# Patient Record
Sex: FEMALE | Race: WHITE | NOT HISPANIC OR LATINO | Employment: FULL TIME | URBAN - METROPOLITAN AREA
[De-identification: names, ages, dates, MRNs, and addresses within clinical notes are randomized per-mention and may not be internally consistent; named-entity substitution may affect disease eponyms.]

---

## 2023-03-12 PROBLEM — B37.31 VAGINAL YEAST INFECTION: Status: ACTIVE | Noted: 2023-03-12

## 2023-03-12 PROBLEM — N32.81 OVERACTIVE BLADDER: Status: ACTIVE | Noted: 2023-03-12

## 2023-03-12 PROBLEM — M54.17 LUMBOSACRAL RADICULITIS: Status: ACTIVE | Noted: 2023-03-12

## 2023-03-12 PROBLEM — N94.6 DYSMENORRHEA: Status: ACTIVE | Noted: 2023-03-12

## 2023-03-12 PROBLEM — M79.18 MYOFASCIAL PAIN SYNDROME: Status: ACTIVE | Noted: 2023-03-12

## 2023-03-12 PROBLEM — M99.05 SEGMENTAL AND SOMATIC DYSFUNCTION OF PELVIC REGION: Status: ACTIVE | Noted: 2023-03-12

## 2023-03-12 PROBLEM — R30.0 DYSURIA: Status: ACTIVE | Noted: 2023-03-12

## 2023-03-12 PROBLEM — M99.04 SEGMENTAL AND SOMATIC DYSFUNCTION OF SACRAL REGION: Status: ACTIVE | Noted: 2023-03-12

## 2023-03-12 PROBLEM — R39.89 BLADDER PAIN: Status: ACTIVE | Noted: 2023-03-12

## 2023-03-12 PROBLEM — N39.0 UTI (URINARY TRACT INFECTION): Status: ACTIVE | Noted: 2023-03-12

## 2023-03-12 PROBLEM — M62.89 PELVIC FLOOR DYSFUNCTION: Status: ACTIVE | Noted: 2023-03-12

## 2023-03-12 PROBLEM — M53.3 SACROILIAC DYSFUNCTION: Status: ACTIVE | Noted: 2023-03-12

## 2023-03-12 PROBLEM — M99.03 SEGMENTAL AND SOMATIC DYSFUNCTION OF LUMBAR REGION: Status: ACTIVE | Noted: 2023-03-12

## 2023-03-12 RX ORDER — CETIRIZINE HYDROCHLORIDE 10 MG/1
1 TABLET ORAL DAILY
COMMUNITY
Start: 2020-10-12 | End: 2023-05-24

## 2023-03-12 RX ORDER — ETONOGESTREL AND ETHINYL ESTRADIOL VAGINAL RING .015; .12 MG/D; MG/D
RING VAGINAL
COMMUNITY
Start: 2021-05-21 | End: 2023-05-24

## 2023-03-12 RX ORDER — CYCLOBENZAPRINE HCL 10 MG
TABLET ORAL
COMMUNITY
Start: 2022-02-23 | End: 2023-05-24

## 2023-03-12 RX ORDER — TERCONAZOLE 8 MG/G
CREAM VAGINAL
COMMUNITY
Start: 2021-04-26 | End: 2023-05-24

## 2023-03-12 RX ORDER — KETOCONAZOLE 20 MG/ML
SHAMPOO, SUSPENSION TOPICAL
COMMUNITY
Start: 2022-03-16 | End: 2024-04-10 | Stop reason: SDUPTHER

## 2023-03-12 RX ORDER — FLUCONAZOLE 100 MG/1
1 TABLET ORAL DAILY
COMMUNITY
Start: 2022-01-18 | End: 2023-05-24 | Stop reason: ALTCHOICE

## 2023-03-12 RX ORDER — HYDROXYZINE HYDROCHLORIDE 25 MG/1
1 TABLET, FILM COATED ORAL NIGHTLY
COMMUNITY
Start: 2020-12-16 | End: 2024-01-22 | Stop reason: SDUPTHER

## 2023-03-12 RX ORDER — CLOTRIMAZOLE AND BETAMETHASONE DIPROPIONATE 10; .64 MG/G; MG/G
CREAM TOPICAL
COMMUNITY
Start: 2022-01-18 | End: 2023-05-24

## 2023-03-12 RX ORDER — TRIAMCINOLONE ACETONIDE 0.25 MG/G
CREAM TOPICAL
COMMUNITY
Start: 2022-01-27 | End: 2023-05-24

## 2023-03-12 RX ORDER — AMITRIPTYLINE HYDROCHLORIDE 50 MG/1
1 TABLET, FILM COATED ORAL NIGHTLY
COMMUNITY
Start: 2021-04-26 | End: 2024-04-10 | Stop reason: SDUPTHER

## 2023-03-12 RX ORDER — IPRATROPIUM BROMIDE 21 UG/1
SPRAY, METERED NASAL
COMMUNITY
Start: 2020-01-27 | End: 2023-05-24

## 2023-03-12 RX ORDER — CLOBETASOL PROPIONATE 0.5 MG/G
OINTMENT TOPICAL
COMMUNITY
Start: 2020-05-18 | End: 2023-05-24

## 2023-03-12 RX ORDER — TRAMADOL HYDROCHLORIDE 50 MG/1
1 TABLET ORAL 3 TIMES DAILY PRN
COMMUNITY
Start: 2020-10-18 | End: 2023-10-30 | Stop reason: SDUPTHER

## 2023-03-12 RX ORDER — PREGABALIN 50 MG/1
CAPSULE ORAL
COMMUNITY
Start: 2021-05-24 | End: 2023-05-24

## 2023-03-12 RX ORDER — AZELAIC ACID 0.15 G/G
GEL TOPICAL
COMMUNITY
Start: 2022-02-24 | End: 2024-04-10 | Stop reason: SDUPTHER

## 2023-03-12 RX ORDER — TAMSULOSIN HYDROCHLORIDE 0.4 MG/1
1 CAPSULE ORAL NIGHTLY
COMMUNITY
Start: 2020-10-18 | End: 2024-04-10 | Stop reason: ALTCHOICE

## 2023-03-12 RX ORDER — MOMETASONE FUROATE 50 UG/1
SPRAY, METERED NASAL
COMMUNITY
Start: 2020-03-12 | End: 2023-05-24

## 2023-03-12 RX ORDER — AZELASTINE 1 MG/ML
SPRAY, METERED NASAL
COMMUNITY
Start: 2020-03-10 | End: 2023-05-24

## 2023-03-12 RX ORDER — MONTELUKAST SODIUM 10 MG/1
TABLET ORAL
COMMUNITY
Start: 2020-02-10 | End: 2023-05-24

## 2023-03-12 RX ORDER — ESCITALOPRAM OXALATE 10 MG/1
TABLET ORAL
COMMUNITY
Start: 2020-03-18 | End: 2023-05-24

## 2023-03-12 RX ORDER — SOLIFENACIN SUCCINATE 10 MG/1
1 TABLET, FILM COATED ORAL DAILY
COMMUNITY
Start: 2020-10-10 | End: 2023-10-30 | Stop reason: SDUPTHER

## 2023-03-12 RX ORDER — NORETHINDRONE 5 MG/1
1 TABLET ORAL DAILY
COMMUNITY
Start: 2021-01-14 | End: 2023-05-24

## 2023-03-12 RX ORDER — PHENAZOPYRIDINE HYDROCHLORIDE 200 MG/1
1 TABLET, FILM COATED ORAL DAILY PRN
COMMUNITY
Start: 2021-02-26 | End: 2023-05-24 | Stop reason: ALTCHOICE

## 2023-05-22 ASSESSMENT — PROMIS GLOBAL HEALTH SCALE
CARRYOUT_PHYSICAL_ACTIVITIES: COMPLETELY
RATE_MENTAL_HEALTH: GOOD
RATE_QUALITY_OF_LIFE: VERY GOOD
CARRYOUT_SOCIAL_ACTIVITIES: VERY GOOD
RATE_AVERAGE_FATIGUE: MODERATE
RATE_PHYSICAL_HEALTH: GOOD
EMOTIONAL_PROBLEMS: SOMETIMES
RATE_AVERAGE_PAIN: 4
RATE_SOCIAL_SATISFACTION: VERY GOOD
RATE_GENERAL_HEALTH: GOOD

## 2023-05-24 ENCOUNTER — OFFICE VISIT (OUTPATIENT)
Dept: PRIMARY CARE | Facility: CLINIC | Age: 38
End: 2023-05-24
Payer: COMMERCIAL

## 2023-05-24 VITALS
BODY MASS INDEX: 22.13 KG/M2 | RESPIRATION RATE: 16 BRPM | HEART RATE: 88 BPM | WEIGHT: 146 LBS | SYSTOLIC BLOOD PRESSURE: 116 MMHG | OXYGEN SATURATION: 100 % | HEIGHT: 68 IN | DIASTOLIC BLOOD PRESSURE: 78 MMHG | TEMPERATURE: 98.3 F

## 2023-05-24 DIAGNOSIS — R10.2 PELVIC PAIN: ICD-10-CM

## 2023-05-24 DIAGNOSIS — D80.1 HYPOGAMMAGLOBULINEMIA (MULTI): ICD-10-CM

## 2023-05-24 DIAGNOSIS — Z00.00 HEALTH CARE MAINTENANCE: Primary | ICD-10-CM

## 2023-05-24 PROBLEM — F32.A DEPRESSION: Status: ACTIVE | Noted: 2023-05-24

## 2023-05-24 PROBLEM — M53.3 DISORDER OF SACROILIAC JOINT: Status: ACTIVE | Noted: 2023-03-07

## 2023-05-24 PROBLEM — N76.0 RECURRENT VAGINITIS: Status: ACTIVE | Noted: 2022-01-18

## 2023-05-24 PROBLEM — N30.10 INTERSTITIAL CYSTITIS: Status: ACTIVE | Noted: 2020-09-03

## 2023-05-24 PROBLEM — G89.29 CHRONIC PELVIC PAIN IN FEMALE: Status: ACTIVE | Noted: 2020-09-03

## 2023-05-24 PROBLEM — J34.3 NASAL TURBINATE HYPERTROPHY: Status: ACTIVE | Noted: 2020-10-12

## 2023-05-24 PROCEDURE — 99204 OFFICE O/P NEW MOD 45 MIN: CPT | Performed by: INTERNAL MEDICINE

## 2023-05-24 PROCEDURE — 1036F TOBACCO NON-USER: CPT | Performed by: INTERNAL MEDICINE

## 2023-05-24 RX ORDER — FLUOXETINE 10 MG/1
CAPSULE ORAL
COMMUNITY
Start: 2023-03-06 | End: 2023-07-24 | Stop reason: ALTCHOICE

## 2023-05-24 RX ORDER — METHENAMINE, SODIUM PHOSPHATE, MONOBASIC, MONOHYDRATE, PHENYL SALICYLATE, METHYLENE BLUE, AND HYOSCYAMINE SULFATE 118; 40.8; 36; 10; .12 MG/1; MG/1; MG/1; MG/1; MG/1
1 CAPSULE ORAL EVERY 6 HOURS PRN
COMMUNITY
Start: 2022-05-11 | End: 2024-04-04 | Stop reason: SDUPTHER

## 2023-05-24 RX ORDER — DULOXETIN HYDROCHLORIDE 30 MG/1
30 CAPSULE, DELAYED RELEASE ORAL DAILY
Qty: 30 CAPSULE | Refills: 11 | Status: SHIPPED | OUTPATIENT
Start: 2023-05-24 | End: 2023-07-24 | Stop reason: ALTCHOICE

## 2023-05-24 ASSESSMENT — PAIN SCALES - GENERAL: PAINLEVEL: 2

## 2023-05-24 NOTE — PROGRESS NOTES
"Subjective   Vianney Acevedo is a 37 y.o. female who presents for Establish Care.    HPI   NP to establish.    Moved to area within last year.  Sees Dr. Tidwell d/t h/o Interstitial Cystitis.  In PT for bladder pain.  Recommended MRI d/t pain/concern for endometriosis.  GYN, Dr. Truong  Periods painful and heavy.    Wants to cut back on meds.    See scanned doc        Review of Systems   Constitutional:  Negative for fatigue and fever.   Respiratory:  Negative for cough and shortness of breath.    Cardiovascular:  Negative for chest pain and leg swelling.   All other systems reviewed and are negative.      Health Maintenance Due   Topic Date Due    Yearly Adult Physical  Never done    Hepatitis B Vaccines (1 of 3 - 3-dose series) Never done    Lipid Panel  Never done    HIV Screening  Never done    Hepatitis C Screening  Never done    Cervical Cancer Screening  Never done    Varicella Vaccines (1 of 2 - 2-dose childhood series) Never done    COVID-19 Vaccine (4 - Booster for Pfizer series) 11/02/2022       Objective   /78   Pulse 88   Temp 36.8 °C (98.3 °F)   Resp 16   Ht 1.727 m (5' 8\")   Wt 66.2 kg (146 lb)   LMP 05/03/2023   SpO2 100%   BMI 22.20 kg/m²     Physical Exam  Vitals and nursing note reviewed.   Constitutional:       Appearance: Normal appearance.   HENT:      Head: Normocephalic.   Eyes:      Conjunctiva/sclera: Conjunctivae normal.      Pupils: Pupils are equal, round, and reactive to light.   Cardiovascular:      Rate and Rhythm: Normal rate and regular rhythm.      Pulses: Normal pulses.      Heart sounds: Normal heart sounds.   Pulmonary:      Effort: Pulmonary effort is normal.      Breath sounds: Normal breath sounds.   Musculoskeletal:         General: No swelling.      Cervical back: Neck supple.   Skin:     General: Skin is warm and dry.   Neurological:      General: No focal deficit present.      Mental Status: She is oriented to person, place, and time.         Assessment/Plan "   Problem List Items Addressed This Visit       Hypogammaglobulinemia (CMS/HCC)     Other Visit Diagnoses       Health care maintenance    -  Primary    Relevant Orders    CBC    Comprehensive Metabolic Panel    Lipid Panel    Thyroid Stimulating Hormone    Vitamin B12    Vitamin D, Total    Pelvic pain        Relevant Medications    DULoxetine (Cymbalta) 30 mg DR capsule          Discussed at length hx  Cont to follow with urology and gyn  I have personally reviewed patients OARRS report.  This report is scanned into the emr.  I have considered the risks of abuse, dependence, addiction and diversion.  Cymbalta 30 and cut elavil in half stop prozac  Will slowly adjust meds  Will follow with gyn may need lap

## 2023-05-26 ASSESSMENT — ENCOUNTER SYMPTOMS
FEVER: 0
SHORTNESS OF BREATH: 0
FATIGUE: 0
COUGH: 0

## 2023-07-21 ENCOUNTER — LAB (OUTPATIENT)
Dept: LAB | Facility: LAB | Age: 38
End: 2023-07-21
Payer: COMMERCIAL

## 2023-07-21 DIAGNOSIS — Z00.00 HEALTH CARE MAINTENANCE: ICD-10-CM

## 2023-07-21 LAB
ALANINE AMINOTRANSFERASE (SGPT) (U/L) IN SER/PLAS: 15 U/L (ref 7–45)
ALBUMIN (G/DL) IN SER/PLAS: 4.5 G/DL (ref 3.4–5)
ALKALINE PHOSPHATASE (U/L) IN SER/PLAS: 44 U/L (ref 33–110)
ANION GAP IN SER/PLAS: 12 MMOL/L (ref 10–20)
ASPARTATE AMINOTRANSFERASE (SGOT) (U/L) IN SER/PLAS: 17 U/L (ref 9–39)
BILIRUBIN TOTAL (MG/DL) IN SER/PLAS: 0.5 MG/DL (ref 0–1.2)
CALCIUM (MG/DL) IN SER/PLAS: 9.5 MG/DL (ref 8.6–10.3)
CARBON DIOXIDE, TOTAL (MMOL/L) IN SER/PLAS: 28 MMOL/L (ref 21–32)
CHLORIDE (MMOL/L) IN SER/PLAS: 105 MMOL/L (ref 98–107)
CHOLESTEROL (MG/DL) IN SER/PLAS: 190 MG/DL (ref 0–199)
CHOLESTEROL IN HDL (MG/DL) IN SER/PLAS: 68.4 MG/DL
CHOLESTEROL/HDL RATIO: 2.8
CREATININE (MG/DL) IN SER/PLAS: 0.74 MG/DL (ref 0.5–1.05)
ERYTHROCYTE DISTRIBUTION WIDTH (RATIO) BY AUTOMATED COUNT: 12 % (ref 11.5–14.5)
ERYTHROCYTE MEAN CORPUSCULAR HEMOGLOBIN CONCENTRATION (G/DL) BY AUTOMATED: 32.6 G/DL (ref 32–36)
ERYTHROCYTE MEAN CORPUSCULAR VOLUME (FL) BY AUTOMATED COUNT: 94 FL (ref 80–100)
ERYTHROCYTES (10*6/UL) IN BLOOD BY AUTOMATED COUNT: 4.28 X10E12/L (ref 4–5.2)
GFR FEMALE: >90 ML/MIN/1.73M2
GLUCOSE (MG/DL) IN SER/PLAS: 78 MG/DL (ref 74–99)
HEMATOCRIT (%) IN BLOOD BY AUTOMATED COUNT: 40.2 % (ref 36–46)
HEMOGLOBIN (G/DL) IN BLOOD: 13.1 G/DL (ref 12–16)
LDL: 108 MG/DL (ref 0–99)
LEUKOCYTES (10*3/UL) IN BLOOD BY AUTOMATED COUNT: 4.4 X10E9/L (ref 4.4–11.3)
NRBC (PER 100 WBCS) BY AUTOMATED COUNT: 0 /100 WBC (ref 0–0)
PLATELETS (10*3/UL) IN BLOOD AUTOMATED COUNT: 277 X10E9/L (ref 150–450)
POTASSIUM (MMOL/L) IN SER/PLAS: 4.7 MMOL/L (ref 3.5–5.3)
PROTEIN TOTAL: 6.4 G/DL (ref 6.4–8.2)
SODIUM (MMOL/L) IN SER/PLAS: 140 MMOL/L (ref 136–145)
THYROTROPIN (MIU/L) IN SER/PLAS BY DETECTION LIMIT <= 0.05 MIU/L: 1.12 MIU/L (ref 0.44–3.98)
TRIGLYCERIDE (MG/DL) IN SER/PLAS: 66 MG/DL (ref 0–149)
UREA NITROGEN (MG/DL) IN SER/PLAS: 10 MG/DL (ref 6–23)
VLDL: 13 MG/DL (ref 0–40)

## 2023-07-21 PROCEDURE — 85027 COMPLETE CBC AUTOMATED: CPT

## 2023-07-21 PROCEDURE — 82306 VITAMIN D 25 HYDROXY: CPT

## 2023-07-21 PROCEDURE — 80061 LIPID PANEL: CPT

## 2023-07-21 PROCEDURE — 36415 COLL VENOUS BLD VENIPUNCTURE: CPT

## 2023-07-21 PROCEDURE — 82607 VITAMIN B-12: CPT

## 2023-07-21 PROCEDURE — 84443 ASSAY THYROID STIM HORMONE: CPT

## 2023-07-21 PROCEDURE — 80053 COMPREHEN METABOLIC PANEL: CPT

## 2023-07-22 LAB
CALCIDIOL (25 OH VITAMIN D3) (NG/ML) IN SER/PLAS: 39 NG/ML
COBALAMIN (VITAMIN B12) (PG/ML) IN SER/PLAS: 470 PG/ML (ref 211–911)

## 2023-07-24 ENCOUNTER — OFFICE VISIT (OUTPATIENT)
Dept: PRIMARY CARE | Facility: CLINIC | Age: 38
End: 2023-07-24
Payer: COMMERCIAL

## 2023-07-24 VITALS
WEIGHT: 148 LBS | HEART RATE: 80 BPM | BODY MASS INDEX: 22.43 KG/M2 | HEIGHT: 68 IN | TEMPERATURE: 98.2 F | RESPIRATION RATE: 16 BRPM | DIASTOLIC BLOOD PRESSURE: 72 MMHG | SYSTOLIC BLOOD PRESSURE: 120 MMHG | OXYGEN SATURATION: 100 %

## 2023-07-24 DIAGNOSIS — G89.29 CHRONIC PELVIC PAIN IN FEMALE: Primary | ICD-10-CM

## 2023-07-24 DIAGNOSIS — R10.2 CHRONIC PELVIC PAIN IN FEMALE: Primary | ICD-10-CM

## 2023-07-24 PROCEDURE — 1036F TOBACCO NON-USER: CPT | Performed by: INTERNAL MEDICINE

## 2023-07-24 PROCEDURE — 99213 OFFICE O/P EST LOW 20 MIN: CPT | Performed by: INTERNAL MEDICINE

## 2023-07-24 RX ORDER — DULOXETIN HYDROCHLORIDE 60 MG/1
60 CAPSULE, DELAYED RELEASE ORAL DAILY
Qty: 30 CAPSULE | Refills: 5 | Status: SHIPPED | OUTPATIENT
Start: 2023-07-24 | End: 2023-08-16

## 2023-07-24 ASSESSMENT — PAIN SCALES - GENERAL: PAINLEVEL: 3

## 2023-07-24 ASSESSMENT — PATIENT HEALTH QUESTIONNAIRE - PHQ9
1. LITTLE INTEREST OR PLEASURE IN DOING THINGS: NOT AT ALL
2. FEELING DOWN, DEPRESSED OR HOPELESS: NOT AT ALL
SUM OF ALL RESPONSES TO PHQ9 QUESTIONS 1 AND 2: 0

## 2023-07-24 NOTE — PROGRESS NOTES
"Subjective   Vianney Acevedo is a 37 y.o. female who presents for 2 month follow up.    HPI   Tolerating Cymbalta well.  Taking 1/2 tab amitriptyline.  Bladder sx's have not worsened.  C/o fatigue.    Reports mole to back, changing colors.  Has appt w/Derm in Dec for alternate concern.    Review of Systems   Constitutional:  Negative for fatigue and fever.   Respiratory:  Negative for cough and shortness of breath.    Cardiovascular:  Negative for chest pain and leg swelling.   All other systems reviewed and are negative.      Health Maintenance Due   Topic Date Due    Yearly Adult Physical  Never done    Hepatitis B Vaccines (1 of 3 - 3-dose series) Never done    HIV Screening  Never done    Hepatitis C Screening  Never done    Cervical Cancer Screening  Never done    Varicella Vaccines (1 of 2 - 2-dose childhood series) Never done    COVID-19 Vaccine (4 - Booster for Pfizer series) 11/02/2022       Objective   /72   Pulse 80   Temp 36.8 °C (98.2 °F)   Resp 16   Ht 1.727 m (5' 8\")   Wt 67.1 kg (148 lb)   SpO2 100%   BMI 22.50 kg/m²     Physical Exam  Vitals and nursing note reviewed.   Constitutional:       Appearance: Normal appearance.   HENT:      Head: Normocephalic.   Eyes:      Conjunctiva/sclera: Conjunctivae normal.      Pupils: Pupils are equal, round, and reactive to light.   Cardiovascular:      Rate and Rhythm: Normal rate and regular rhythm.      Pulses: Normal pulses.      Heart sounds: Normal heart sounds.   Pulmonary:      Effort: Pulmonary effort is normal.      Breath sounds: Normal breath sounds.   Musculoskeletal:         General: No swelling.      Cervical back: Neck supple.   Skin:     General: Skin is warm and dry.   Neurological:      General: No focal deficit present.      Mental Status: She is oriented to person, place, and time.         Assessment/Plan   Problem List Items Addressed This Visit       Chronic pelvic pain in female - Primary    Relevant Medications    DULoxetine " (Cymbalta) 60 mg DR capsule     Increase duloxetine   Follow up in 3 months

## 2023-07-25 ASSESSMENT — ENCOUNTER SYMPTOMS
SHORTNESS OF BREATH: 0
COUGH: 0
FATIGUE: 0
FEVER: 0

## 2023-07-31 ENCOUNTER — APPOINTMENT (OUTPATIENT)
Dept: PRIMARY CARE | Facility: CLINIC | Age: 38
End: 2023-07-31
Payer: COMMERCIAL

## 2023-08-16 DIAGNOSIS — R10.2 CHRONIC PELVIC PAIN IN FEMALE: ICD-10-CM

## 2023-08-16 DIAGNOSIS — G89.29 CHRONIC PELVIC PAIN IN FEMALE: ICD-10-CM

## 2023-08-16 LAB
AMPHETAMINE (PRESENCE) IN URINE BY SCREEN METHOD: NORMAL
BARBITURATES PRESENCE IN URINE BY SCREEN METHOD: NORMAL
BENZODIAZEPINE (PRESENCE) IN URINE BY SCREEN METHOD: NORMAL
CANNABINOIDS IN URINE BY SCREEN METHOD: NORMAL
COCAINE (PRESENCE) IN URINE BY SCREEN METHOD: NORMAL
DRUG SCREEN COMMENT URINE: NORMAL
FENTANYL URINE: NORMAL
METHADONE (PRESENCE) IN URINE BY SCREEN METHOD: NORMAL
OPIATES (PRESENCE) IN URINE BY SCREEN METHOD: NORMAL
OXYCODONE (PRESENCE) IN URINE BY SCREEN METHOD: NORMAL
PHENCYCLIDINE (PRESENCE) IN URINE BY SCREEN METHOD: NORMAL

## 2023-08-16 RX ORDER — DULOXETIN HYDROCHLORIDE 60 MG/1
60 CAPSULE, DELAYED RELEASE ORAL DAILY
Qty: 30 CAPSULE | Refills: 5 | Status: SHIPPED | OUTPATIENT
Start: 2023-08-16 | End: 2023-08-21 | Stop reason: SINTOL

## 2023-08-17 LAB — URINE CULTURE: NO GROWTH

## 2023-08-21 DIAGNOSIS — D80.1 HYPOGAMMAGLOBULINEMIA (MULTI): Primary | ICD-10-CM

## 2023-08-21 DIAGNOSIS — G89.29 CHRONIC PELVIC PAIN IN FEMALE: ICD-10-CM

## 2023-08-21 DIAGNOSIS — R10.2 CHRONIC PELVIC PAIN IN FEMALE: ICD-10-CM

## 2023-08-21 RX ORDER — DULOXETIN HYDROCHLORIDE 30 MG/1
30 CAPSULE, DELAYED RELEASE ORAL DAILY
Qty: 30 CAPSULE | Refills: 11 | Status: SHIPPED | OUTPATIENT
Start: 2023-08-21 | End: 2023-10-30 | Stop reason: WASHOUT

## 2023-09-02 ENCOUNTER — TELEMEDICINE (OUTPATIENT)
Dept: PRIMARY CARE | Facility: CLINIC | Age: 38
End: 2023-09-02
Payer: COMMERCIAL

## 2023-09-02 ENCOUNTER — APPOINTMENT (OUTPATIENT)
Dept: PRIMARY CARE | Facility: CLINIC | Age: 38
End: 2023-09-02
Payer: COMMERCIAL

## 2023-09-02 DIAGNOSIS — U07.1 COVID: Primary | ICD-10-CM

## 2023-09-02 PROCEDURE — 99213 OFFICE O/P EST LOW 20 MIN: CPT | Performed by: FAMILY MEDICINE

## 2023-09-02 RX ORDER — BENZONATATE 200 MG/1
200 CAPSULE ORAL 3 TIMES DAILY PRN
Qty: 42 CAPSULE | Refills: 0 | Status: SHIPPED | OUTPATIENT
Start: 2023-09-02 | End: 2023-10-06

## 2023-09-02 NOTE — PATIENT INSTRUCTIONS
Please send me a [email or web address] message if you have any questions or concerns.  FOR NON URGENT questions only.  Allow up to 72 hours for response.    If you have prescription issues or other questions you can email   Vikash Godoy,  Digital Health Coordinator, at   [email or web address]    COVID +  Drug interactions so no Paxlovid  Molnupiravir discussed but she is low risk and it is only 30% effective  Rest and increase fluids  CDC guidelines discussed  Follow up if worsens or persists     Rest and drink plenty of fluids    Tylenol and or motrin as needed for pain and fever (unless you have been told not to take these because of your personal medical history)    Discussed options and precautions:   Viral versus bacterial infection; use of medications; possible side effects; appropriate over-the-counter medications; possible complications and /or when to follow-up.    Follow-up in 1 to 2 days if not improving.  Follow-up immediately if symptoms worsen.    All red flags requiring in person care were discussed.  All patient's questions were answered.    Limitations to telemedicine include inability to do a complete and accurate physical exam.  Any concerns regarding this were conveyed with the patient and in person follow-up recommended if patient nature of illness does not progress as anticipated during this visit.     Over-the-counter cold and cough medications    Take Mucinex for cough, drink plenty of fluids with this medication and will help break up congestion making it easier to cough up    For cough can use honey (children ages 1 and up) in hot tea or hot water. I recommend putting this in an insulated cup and carrying it around throughout the day to sip on.  Have it at your bedside at night in case you wake up coughing.  You can also use honey cough drops (adults and older children).    Recommend nasal saline for use in children and adults.  Neti De Paz can also be helpful.  (Never used tap water and a  Neti pot.  Use distilled water.)    If you have plugged up congested ears or the feeling of fluid in your ears, you can use an over-the-counter nasal steroid spray like fluticasone (brand name Flonase) use 2 sprays into each nostril once or twice a day for 7 days.  This will help open up the eustachian tubes and allow the fluid to drain out of your ears.    Sleeping with your head/chest elevated can help with sinus drainage.    Adults only-can use nasal decongestant (like Afrin) at bedtime to open nasal passages so you can breathe through your nose while you sleep; avoid using for longer than 3 days as this medication can become addicting.  Do not use if you have high blood pressure or high heart rate.    For severe pain or fever in adult-Tylenol (2 extra strength) or ibuprofen (3-4 tabs equals 600 to 800 mg) alternating as needed for pain.  Tylenol doses should be 6 to 8 hours apart and ibuprofen doses should be 6 to 8 hours apart.        Common cold medications for adults explained:    **Cold medications for children are not recommended-only Tylenol, Motrin, honey (only for children older than 1 year), cool-mist humidifier, and nasal saline spray are recommended for children.    Mucinex-(generic name guaifenesin)-is an expectorant.  This will thin out all the thick mucus.  Must drink plenty of liquids for this medicine to work.    Dextromethorphan (brand name Delsym or DM)-this medicine is a cough suppressant    Honey in hot water or tea-this is a natural cough suppressant    Decongestant nasal spray- (eg: Afrin) use for temporary relief of nasal congestion-best when used at bedtime to open up nasal passages so that you are not forced to mouth breathe overnight.    Sudafed (generic name pseudoephedrine-this must be bought from the pharmacist) DO NOT use this medicine if you have high blood pressure as it can raise your blood pressure higher.  Do not use if you have any irregular heart rate.  This medicine can help  clear congestion in your sinuses.

## 2023-09-02 NOTE — PROGRESS NOTES
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--tmax 99  Taking tylenol  +chills/aches  +HA  +ST  Started to get some chest congestion and mucous cough-  No chest pain/heaviness/SOB/wheezing    COVID hx-- never had it in past---  COVID VAXXED x 4    OTC-- tylenol cold and flu  Lives with  and he is sick too--tested + last night---    ALL OTHER ROS (-)    General appearance:  Vitals available from patient?No  Alert, oriented, pleasant, in no apparent distress Yes  Answers questions appropriatelyYes  Eyes clear?Yes  Throat exam Not Available  Is patient in respiratory distressNo  Is patient coughing during consult:Yes  Audible wheezing noted? :No  Pt sounds congested?: Yes  Sniffing or rhinorrhea?: Yes  Frontal sinus TTP by palpation? No  Maxillary sinus TTP by palpation?No  Tender neck LAD by pt exam?:No  Preauricular LAD by pt exam?:N/A  Abdominal TTP by pt exam?:N/A  CVS tenderness by pt exam?N/A  Psychiatric: Affect normalYes  Other relevant physical exam:      Pt location in [place] and consent obtained. Telemedicine appropriate evaluation completed. Appropriate physical exam done.     COVID +  Drug interactions so no Paxlovid  Molnupiravir discussed but she is low risk and it is only 30% effective  Rest and increase fluids  CDC guidelines discussed  Follow up if worsens or persists

## 2023-09-12 LAB — URINE CULTURE: NO GROWTH

## 2023-10-06 ENCOUNTER — OFFICE VISIT (OUTPATIENT)
Dept: PRIMARY CARE | Facility: CLINIC | Age: 38
End: 2023-10-06
Payer: COMMERCIAL

## 2023-10-06 VITALS
BODY MASS INDEX: 22.88 KG/M2 | HEART RATE: 88 BPM | DIASTOLIC BLOOD PRESSURE: 70 MMHG | OXYGEN SATURATION: 100 % | WEIGHT: 151 LBS | HEIGHT: 68 IN | RESPIRATION RATE: 16 BRPM | TEMPERATURE: 97.9 F | SYSTOLIC BLOOD PRESSURE: 110 MMHG

## 2023-10-06 DIAGNOSIS — Z23 ENCOUNTER FOR IMMUNIZATION: ICD-10-CM

## 2023-10-06 DIAGNOSIS — D80.1 HYPOGAMMAGLOBULINEMIA (MULTI): ICD-10-CM

## 2023-10-06 DIAGNOSIS — N32.81 OVERACTIVE BLADDER: ICD-10-CM

## 2023-10-06 DIAGNOSIS — R10.2 CHRONIC PELVIC PAIN IN FEMALE: ICD-10-CM

## 2023-10-06 DIAGNOSIS — F33.1 MODERATE EPISODE OF RECURRENT MAJOR DEPRESSIVE DISORDER (MULTI): Primary | ICD-10-CM

## 2023-10-06 DIAGNOSIS — G89.29 CHRONIC PELVIC PAIN IN FEMALE: ICD-10-CM

## 2023-10-06 PROBLEM — J34.3 NASAL TURBINATE HYPERTROPHY: Status: RESOLVED | Noted: 2020-10-12 | Resolved: 2023-10-06

## 2023-10-06 PROBLEM — B37.31 VAGINAL YEAST INFECTION: Status: RESOLVED | Noted: 2023-03-12 | Resolved: 2023-10-06

## 2023-10-06 PROBLEM — R39.89 BLADDER PAIN: Status: RESOLVED | Noted: 2023-03-12 | Resolved: 2023-10-06

## 2023-10-06 PROBLEM — N39.0 UTI (URINARY TRACT INFECTION): Status: RESOLVED | Noted: 2023-03-12 | Resolved: 2023-10-06

## 2023-10-06 PROBLEM — M54.17 LUMBOSACRAL RADICULITIS: Status: RESOLVED | Noted: 2023-03-12 | Resolved: 2023-10-06

## 2023-10-06 PROBLEM — R30.0 DYSURIA: Status: RESOLVED | Noted: 2023-03-12 | Resolved: 2023-10-06

## 2023-10-06 PROBLEM — N94.6 DYSMENORRHEA: Status: RESOLVED | Noted: 2023-03-12 | Resolved: 2023-10-06

## 2023-10-06 PROBLEM — M53.3 DISORDER OF SACROILIAC JOINT: Status: RESOLVED | Noted: 2023-03-07 | Resolved: 2023-10-06

## 2023-10-06 PROBLEM — M79.18 MYOFASCIAL PAIN SYNDROME: Status: RESOLVED | Noted: 2023-03-12 | Resolved: 2023-10-06

## 2023-10-06 PROCEDURE — 90471 IMMUNIZATION ADMIN: CPT | Performed by: INTERNAL MEDICINE

## 2023-10-06 PROCEDURE — 90686 IIV4 VACC NO PRSV 0.5 ML IM: CPT | Performed by: INTERNAL MEDICINE

## 2023-10-06 PROCEDURE — 99213 OFFICE O/P EST LOW 20 MIN: CPT | Performed by: INTERNAL MEDICINE

## 2023-10-06 PROCEDURE — 1036F TOBACCO NON-USER: CPT | Performed by: INTERNAL MEDICINE

## 2023-10-06 RX ORDER — BUPROPION HYDROCHLORIDE 150 MG/1
150 TABLET ORAL EVERY MORNING
Qty: 30 TABLET | Refills: 5 | Status: SHIPPED | OUTPATIENT
Start: 2023-10-06 | End: 2023-10-27 | Stop reason: DRUGHIGH

## 2023-10-06 RX ORDER — NORETHINDRONE 5 MG/1
5 TABLET ORAL DAILY
COMMUNITY
Start: 2023-09-13 | End: 2024-04-10 | Stop reason: ALTCHOICE

## 2023-10-06 ASSESSMENT — ENCOUNTER SYMPTOMS
FATIGUE: 0
FEVER: 0
SHORTNESS OF BREATH: 0
COUGH: 0

## 2023-10-06 ASSESSMENT — PAIN SCALES - GENERAL: PAINLEVEL: 1

## 2023-10-06 NOTE — PROGRESS NOTES
"Subjective   Vianney Acevedo is a 37 y.o. female who presents for Chronic Pelvic Pain follow up    HPI   Taking Amitriptyline 50 mg and Cymbalta 30 mg daily.  Feeling fatigued.  Decided to move forward w/laparoscopic diagnostic surgery for endometriosis.    Covid positive x1 month ago.  Recovering well.  Some fatigue.      Review of Systems   Constitutional:  Negative for fatigue and fever.   Respiratory:  Negative for cough and shortness of breath.    Cardiovascular:  Negative for chest pain and leg swelling.   All other systems reviewed and are negative.      Health Maintenance Due   Topic Date Due    Yearly Adult Physical  Never done    Hepatitis B Vaccines (1 of 3 - 3-dose series) Never done    HIV Screening  Never done    Hepatitis C Screening  Never done    Cervical Cancer Screening  Never done    Varicella Vaccines (1 of 2 - 2-dose childhood series) Never done    COVID-19 Vaccine (4 - Pfizer series) 11/02/2022       Objective   /70   Pulse 88   Temp 36.6 °C (97.9 °F)   Resp 16   Ht 1.727 m (5' 8\")   Wt 68.5 kg (151 lb)   SpO2 100%   BMI 22.96 kg/m²     Physical Exam  Vitals and nursing note reviewed.   Constitutional:       Appearance: Normal appearance.   HENT:      Head: Normocephalic.   Eyes:      Conjunctiva/sclera: Conjunctivae normal.      Pupils: Pupils are equal, round, and reactive to light.   Cardiovascular:      Rate and Rhythm: Normal rate and regular rhythm.      Pulses: Normal pulses.      Heart sounds: Normal heart sounds.   Pulmonary:      Effort: Pulmonary effort is normal.      Breath sounds: Normal breath sounds.   Musculoskeletal:         General: No swelling.      Cervical back: Neck supple.   Skin:     General: Skin is warm and dry.   Neurological:      General: No focal deficit present.      Mental Status: She is oriented to person, place, and time.         Assessment/Plan   Problem List Items Addressed This Visit       Overactive bladder    Chronic pelvic pain in female    " Depression - Primary    Relevant Medications    buPROPion XL (Wellbutrin XL) 150 mg 24 hr tablet    Hypogammaglobulinemia (CMS/HCC)     Other Visit Diagnoses       Encounter for immunization        Relevant Orders    Flu vaccine (IIV4) age 6 months and greater, preservative free (Completed)          Discussed at length  Will add wellbutin  If doing well duloxetine every other day and wean off if improving  Then one at a time stop flomax and vesicare  Planning laparoscopy in jan with gyn  Fu in 6 months

## 2023-10-26 ENCOUNTER — APPOINTMENT (OUTPATIENT)
Dept: PRIMARY CARE | Facility: CLINIC | Age: 38
End: 2023-10-26
Payer: COMMERCIAL

## 2023-10-27 DIAGNOSIS — F33.1 MODERATE EPISODE OF RECURRENT MAJOR DEPRESSIVE DISORDER (MULTI): ICD-10-CM

## 2023-10-27 RX ORDER — BUPROPION HYDROCHLORIDE 300 MG/1
300 TABLET ORAL EVERY MORNING
Qty: 30 TABLET | Refills: 5 | Status: SHIPPED | OUTPATIENT
Start: 2023-10-27 | End: 2023-11-20 | Stop reason: SINTOL

## 2023-10-27 RX ORDER — BUPROPION HYDROCHLORIDE 300 MG/1
300 TABLET ORAL EVERY MORNING
Qty: 30 TABLET | Refills: 5 | Status: SHIPPED | OUTPATIENT
Start: 2023-10-27 | End: 2023-10-27 | Stop reason: SDUPTHER

## 2023-10-30 ENCOUNTER — OFFICE VISIT (OUTPATIENT)
Dept: OBSTETRICS AND GYNECOLOGY | Facility: CLINIC | Age: 38
End: 2023-10-30
Payer: COMMERCIAL

## 2023-10-30 DIAGNOSIS — R10.2 CHRONIC PELVIC PAIN IN FEMALE: Primary | ICD-10-CM

## 2023-10-30 DIAGNOSIS — N32.81 OVERACTIVE BLADDER: ICD-10-CM

## 2023-10-30 DIAGNOSIS — G89.29 CHRONIC PELVIC PAIN IN FEMALE: Primary | ICD-10-CM

## 2023-10-30 PROCEDURE — 99212 OFFICE O/P EST SF 10 MIN: CPT | Performed by: OBSTETRICS & GYNECOLOGY

## 2023-10-30 PROCEDURE — 1036F TOBACCO NON-USER: CPT | Performed by: OBSTETRICS & GYNECOLOGY

## 2023-10-30 RX ORDER — TRAMADOL HYDROCHLORIDE 50 MG/1
50 TABLET ORAL 3 TIMES DAILY PRN
Qty: 60 TABLET | Refills: 0 | Status: SHIPPED | OUTPATIENT
Start: 2023-10-30 | End: 2024-01-22 | Stop reason: SDUPTHER

## 2023-10-30 RX ORDER — SOLIFENACIN SUCCINATE 10 MG/1
10 TABLET, FILM COATED ORAL DAILY
Qty: 90 TABLET | Refills: 3 | Status: SHIPPED | OUTPATIENT
Start: 2023-10-30 | End: 2024-04-10 | Stop reason: ALTCHOICE

## 2023-10-30 NOTE — PROGRESS NOTES
Urogynecology  Provider:  Randi Tidwell MD  [phone]    ASSESSMENT AND PLAN:   Assessment:   37 year old female being assessed for OAB and chronic pelvic pain.    Diagnoses:   #1 OAB  #2 Chronic pelvic pain    Plan:   1. Chronic pelvic pain  - Discussed weaning off tamsulosin.  - Refill tramadol 50 mg. Contract signed.    2. OAB  - Refill solifenacin 10 mg. Will need to get off if planning to get pregnant.      Follow-up virtually in 3 months with Dr. Tidwell.      Scribe Attestation  By signing my name below, I, Shira Serrato, Rizwan   attest that this documentation has been prepared under the direction and in the presence of Randi Tidwell MD.       I spent a total of eConsult Time: 15 minutes in face to face and non face to face time.      Agree with above  I Dr. Tidwell, personally performed the services described in the documentation which was scribed virtually and confirm it is both complete and accurate.        Randi Tidwell MD        HISTORY OF PRESENT ILLNESS:   36 y/o female who presents today in clinic for chronic pelvic pain.     Records Review:  - Last visit virtual 2023. Pt currently quarantined with COVID. Tramadol and uribel refilled. Contract  2023. Planned to have her come in once recovered in next 1-2 months to sign a new contract.    Medical History:  - She is taking Amitriptyline 50 mg.  - She started wellbutrin for 1 month and is doing well on it.  - She is on tamsulosin.     Pelvic Symptoms:  - She was started on norethindrone 5 mg daily 5-6 weeks ago.   - Denies having had any bad reactions with it.  - Notes improvement with bleeding and pain on medication.   - She is taking uribel and denies needing a refill.  - Requesting refill for tramadol.  - She was seen by Dr. Santos and is planning on having surgery.  - Her  and her have been discussing getting pregnant.     Urinary Symptoms:  - Requesting solifenacin 10 mg refill.   - Notes improvement on  medication.     Interval History:  - She is recently .             Past Medical History:      Past Medical History:   Diagnosis Date    Anxiety 2004    Interstitial cystitis (chronic) without hematuria 11/12/2020    Cystitis, interstitial    Other specified disorders of nose and nasal sinuses 11/12/2020    Nasal hypertrophy    Personal history of diseases of the skin and subcutaneous tissue 11/12/2020    History of folliculitis    Substance abuse (CMS/[facility]) Sober from alcohol since July 2016    Urinary tract infection 2005          Past Surgical History:       Past Surgical History:   Procedure Laterality Date    CYSTOSCOPY           Medications:       Prior to Admission medications    Medication Sig Start Date End Date Taking? Authorizing Provider   amitriptyline (Elavil) 50 mg tablet Take 1 tablet (50 mg) by mouth once daily at bedtime. 4/26/21   Historical Provider, MD   azelaic acid (Finacea) 15 % gel Azelaic Acid 15 % External Gel   Quantity: 50  Refills: 0        Start : 24-Feb-2022   Active 2/24/22   Historical Provider, MD   buPROPion XL (Wellbutrin XL) 300 mg 24 hr tablet Take 1 tablet (300 mg) by mouth once daily in the morning. Do not crush, chew, or split. 10/27/23 4/24/24  Josephine Kapoor DO   DULoxetine (Cymbalta) 30 mg DR capsule Take 1 capsule (30 mg) by mouth once daily. Do not crush or chew. 8/21/23 8/20/24  Josephine Kapoor DO   hydrOXYzine HCL (Atarax) 25 mg tablet Take 1 tablet (25 mg) by mouth once daily at bedtime. 12/16/20   Historical Provider, MD   ketoconazole (NIZOral) 2 % shampoo Ketoconazole 2 % External Shampoo   Quantity: 120  Refills: 0        Start : 16-Mar-2022   Active 3/16/22   Historical Provider, MD   norethindrone (Aygestin) 5 mg tablet Take 1 tablet (5 mg) by mouth once daily. 9/13/23   Historical Provider, MD   solifenacin (VESIcare) 10 mg tablet Take 1 tablet (10 mg) by mouth once daily. 10/10/20   Historical Provider, MD   tamsulosin (Flomax) 0.4 mg 24 hr capsule  "Take 1 capsule (0.4 mg) by mouth once daily at bedtime. 10/18/20   Historical Provider, MD   traMADol (Ultram) 50 mg tablet Take 1 tablet (50 mg) by mouth 3 times a day as needed. 10/18/20   Historical Provider, MD Quiles 118-10-40.8-36 mg capsule Take 1 capsule by mouth every 6 hours if needed. 5/11/22   Historical Provider, MD   buPROPion XL (Wellbutrin XL) 150 mg 24 hr tablet Take 1 tablet (150 mg) by mouth once daily in the morning. Do not crush, chew, or split. 10/6/23 10/27/23  Josephine Kapoor, DO   buPROPion XL (Wellbutrin XL) 300 mg 24 hr tablet Take 1 tablet (300 mg) by mouth once daily in the morning. Do not crush, chew, or split. 10/27/23 10/27/23  Josephine Kapoor, DO        ROS  Review of Systems   All other systems reviewed and are negative.     No results found for: \"BLOODU\", \"NITRITEU\", \"UROBILINOGEN\"      PHYSICAL EXAM:      There were no vitals taken for this visit.     No LMP recorded.      Declines chaperone for physical exam.      Well developed, well nourished, in no apparent distress.   Neurologic/Psychiatric:  Awake, Alert and Oriented times 3.  Affect normal.         Data and DIAGNOSTIC STUDIES REVIEWED   No results found.   Lab Results   Component Value Date    URINECULTURE NO GROWTH 09/11/2023      Lab Results   Component Value Date    GLUCOSE 78 07/21/2023    CALCIUM 9.5 07/21/2023     07/21/2023    K 4.7 07/21/2023    CO2 28 07/21/2023     07/21/2023    BUN 10 07/21/2023    CREATININE 0.74 07/21/2023     Lab Results   Component Value Date    WBC 4.4 07/21/2023    HGB 13.1 07/21/2023    HCT 40.2 07/21/2023    MCV 94 07/21/2023     07/21/2023          Randi Tidwell MD        "

## 2023-11-02 ENCOUNTER — APPOINTMENT (OUTPATIENT)
Dept: OBSTETRICS AND GYNECOLOGY | Facility: CLINIC | Age: 38
End: 2023-11-02
Payer: COMMERCIAL

## 2023-11-14 DIAGNOSIS — N30.10 INTERSTITIAL CYSTITIS: Primary | ICD-10-CM

## 2023-11-14 RX ORDER — NITROFURANTOIN 25; 75 MG/1; MG/1
100 CAPSULE ORAL 2 TIMES DAILY
Qty: 14 CAPSULE | Refills: 0 | Status: SHIPPED | OUTPATIENT
Start: 2023-11-14 | End: 2023-11-21

## 2023-11-20 DIAGNOSIS — F32.1 CURRENT MODERATE EPISODE OF MAJOR DEPRESSIVE DISORDER WITHOUT PRIOR EPISODE (MULTI): Primary | ICD-10-CM

## 2023-11-20 RX ORDER — BUPROPION HYDROCHLORIDE 150 MG/1
150 TABLET ORAL EVERY MORNING
Qty: 30 TABLET | Refills: 5 | Status: SHIPPED | OUTPATIENT
Start: 2023-11-20 | End: 2024-03-12 | Stop reason: SDUPTHER

## 2023-12-04 ENCOUNTER — APPOINTMENT (OUTPATIENT)
Dept: OBSTETRICS AND GYNECOLOGY | Facility: CLINIC | Age: 38
End: 2023-12-04
Payer: COMMERCIAL

## 2023-12-11 ENCOUNTER — APPOINTMENT (OUTPATIENT)
Dept: OBSTETRICS AND GYNECOLOGY | Facility: CLINIC | Age: 38
End: 2023-12-11
Payer: COMMERCIAL

## 2023-12-15 ASSESSMENT — DERMATOLOGY QUALITY OF LIFE (QOL) ASSESSMENT
RATE HOW BOTHERED YOU ARE BY SYMPTOMS OF YOUR SKIN PROBLEM (EG, ITCHING, STINGING BURNING, HURTING OR SKIN IRRITATION): 1
RATE HOW EMOTIONALLY BOTHERED YOU ARE BY YOUR SKIN PROBLEM (FOR EXAMPLE, WORRY, EMBARRASSMENT, FRUSTRATION): 0 - NEVER BOTHERED
RATE HOW BOTHERED YOU ARE BY SYMPTOMS OF YOUR SKIN PROBLEM (EG, ITCHING, STINGING BURNING, HURTING OR SKIN IRRITATION): 1
RATE HOW EMOTIONALLY BOTHERED YOU ARE BY YOUR SKIN PROBLEM (FOR EXAMPLE, WORRY, EMBARRASSMENT, FRUSTRATION): 0 - NEVER BOTHERED
RATE HOW BOTHERED YOU ARE BY EFFECTS OF YOUR SKIN PROBLEMS ON YOUR ACTIVITIES (EG, GOING OUT, ACCOMPLISHING WHAT YOU WANT, WORK ACTIVITIES OR YOUR RELATIONSHIPS WITH OTHERS): 0 - NEVER BOTHERED
RATE HOW BOTHERED YOU ARE BY EFFECTS OF YOUR SKIN PROBLEMS ON YOUR ACTIVITIES (EG, GOING OUT, ACCOMPLISHING WHAT YOU WANT, WORK ACTIVITIES OR YOUR RELATIONSHIPS WITH OTHERS): 0 - NEVER BOTHERED

## 2023-12-20 ENCOUNTER — OFFICE VISIT (OUTPATIENT)
Dept: DERMATOLOGY | Facility: CLINIC | Age: 38
End: 2023-12-20
Payer: COMMERCIAL

## 2023-12-20 DIAGNOSIS — L73.9 FOLLICULITIS: Primary | ICD-10-CM

## 2023-12-20 PROCEDURE — 1036F TOBACCO NON-USER: CPT | Performed by: DERMATOLOGY

## 2023-12-20 PROCEDURE — 99213 OFFICE O/P EST LOW 20 MIN: CPT | Performed by: DERMATOLOGY

## 2023-12-20 RX ORDER — AZELAIC ACID 0.15 G/G
GEL TOPICAL
Qty: 50 G | Refills: 11 | Status: SHIPPED | OUTPATIENT
Start: 2023-12-20

## 2023-12-20 RX ORDER — TRETINOIN 0.25 MG/G
CREAM TOPICAL
Qty: 45 G | Refills: 11 | Status: SHIPPED | OUTPATIENT
Start: 2023-12-20

## 2023-12-20 RX ORDER — SULFACETAMIDE SODIUM, SULFUR 98; 48 MG/G; MG/G
LIQUID TOPICAL
Qty: 285 G | Refills: 11 | Status: SHIPPED | OUTPATIENT
Start: 2023-12-20

## 2023-12-20 RX ORDER — KETOCONAZOLE 20 MG/ML
SHAMPOO, SUSPENSION TOPICAL
Qty: 120 ML | Refills: 11 | Status: SHIPPED | OUTPATIENT
Start: 2023-12-20

## 2023-12-20 ASSESSMENT — ITCH NUMERIC RATING SCALE: HOW SEVERE IS YOUR ITCHING?: 0

## 2023-12-20 NOTE — PROGRESS NOTES
Subjective     Vianney Miner is a 38 y.o. female who presents for the following: folliculitis (Chest/back- Pt currently using azelaic acid, sulfacetamide cleanser, keto shampoo- good response. Pt states started birth control and has worsened a little since. ) and Acne (Neck/chest/back- pt currently using tretinoin with good response. Pt states started birth control and has worsened a little since. ).     Review of Systems:  No other skin or systemic complaints other than what is documented elsewhere in the note.    The following portions of the chart were reviewed this encounter and updated as appropriate:   Tobacco  Allergies  Meds  Problems  Med Hx  Surg Hx  Fam Hx         Skin Cancer History  No skin cancer on file.      Specialty Problems    None       Objective   Well appearing patient in no apparent distress; mood and affect are within normal limits.    A focused skin examination was performed. All findings within normal limits unless otherwise noted below.    Assessment/Plan   1. Folliculitis  Chest - Medial (Center), Left Upper Back, Right Upper Back  A few very small follicular based papules; mostly clear    -Continue azelaic acid qAM  -Continue tretinoin 0.025% at bedtime  -Continue alternating sulfacetamide sulfur cleanser and ketoconazole cleanser each every other day  -Recent mild flare with initiation of OCP; patient to call me if this does not resolve    Related Medications  azelaic acid (Finacea) 15 % gel  Apply to affected areas once daily    sulfacetamide sodium-sulfur 9.8-4.8 % cleanser  Apply to the affected areas once daily every other day. Let sit for 2 minutes, rinse thoroughly.    ketoconazole (NIZOral) 2 % shampoo  Apply to the affected areas once daily every other day. Let sit for 2 minutes, rinse thoroughly.    tretinoin (Retin-A) 0.025 % cream  Apply a thin layer to affected area at bedtime as tolerated.        Follow up in 1 year for folliculitis  Discussed if there are any  changes or development of concerning symptoms (lesion/skin condition is changing, bleeding, enlarging, or worsening) the patient is to contact my office. The patient verbalizes understanding.    Radha Ji MD  12/20/2023

## 2023-12-28 ENCOUNTER — TELEPHONE (OUTPATIENT)
Dept: OBSTETRICS AND GYNECOLOGY | Facility: CLINIC | Age: 38
End: 2023-12-28
Payer: COMMERCIAL

## 2023-12-28 DIAGNOSIS — R39.9 UTI SYMPTOMS: Primary | ICD-10-CM

## 2023-12-28 NOTE — TELEPHONE ENCOUNTER
Vianney BRANNON Agustinakobe Kristofer reported s/sx UTI, order for urine culture entered per protocol. She will give sample on 1/2/24 because she started taking Macrobid she had on hand 12/23/23. She initially felt better and then sx returned. Due to the holiday she will finish the course and do a AYDEN after the holiday.

## 2024-01-22 ENCOUNTER — TELEPHONE (OUTPATIENT)
Dept: OBSTETRICS AND GYNECOLOGY | Facility: CLINIC | Age: 39
End: 2024-01-22
Payer: COMMERCIAL

## 2024-01-22 ENCOUNTER — TELEMEDICINE (OUTPATIENT)
Dept: OBSTETRICS AND GYNECOLOGY | Facility: CLINIC | Age: 39
End: 2024-01-22
Payer: COMMERCIAL

## 2024-01-22 ENCOUNTER — LAB REQUISITION (OUTPATIENT)
Dept: LAB | Facility: HOSPITAL | Age: 39
End: 2024-01-22
Payer: COMMERCIAL

## 2024-01-22 DIAGNOSIS — R30.0 DYSURIA: ICD-10-CM

## 2024-01-22 DIAGNOSIS — R39.9 UTI SYMPTOMS: Primary | ICD-10-CM

## 2024-01-22 DIAGNOSIS — G89.29 CHRONIC PELVIC PAIN IN FEMALE: ICD-10-CM

## 2024-01-22 DIAGNOSIS — R10.2 CHRONIC PELVIC PAIN IN FEMALE: ICD-10-CM

## 2024-01-22 DIAGNOSIS — N30.10 INTERSTITIAL CYSTITIS: Primary | ICD-10-CM

## 2024-01-22 PROCEDURE — 99212 OFFICE O/P EST SF 10 MIN: CPT | Performed by: OBSTETRICS & GYNECOLOGY

## 2024-01-22 PROCEDURE — 87086 URINE CULTURE/COLONY COUNT: CPT

## 2024-01-22 RX ORDER — HYDROXYZINE HYDROCHLORIDE 25 MG/1
25 TABLET, FILM COATED ORAL NIGHTLY
Qty: 90 TABLET | Refills: 3 | Status: SHIPPED | OUTPATIENT
Start: 2024-01-22 | End: 2024-04-10 | Stop reason: ALTCHOICE

## 2024-01-22 RX ORDER — TRAMADOL HYDROCHLORIDE 50 MG/1
50 TABLET ORAL 3 TIMES DAILY PRN
Qty: 60 TABLET | Refills: 0 | Status: SHIPPED | OUTPATIENT
Start: 2024-01-22 | End: 2024-04-04 | Stop reason: SDUPTHER

## 2024-01-22 NOTE — PROGRESS NOTES
Urogynecology  Provider:  Randi Tidwell MD  [phone]              ASSESSMENT AND PLAN:     Problem List Items Addressed This Visit    None          I spent a total of 12 minutes in face to face and non face to face time at this virtual visit.          Randi Tidwell MD  HISTORY OF PRESENT ILLNESS    Vianney Rico a 38 y.o. virtual visit for pelvic pain and IC    Pelvic symptoms:  - Reports she had a very bad flare up, worse than she has has in a very long time after she started the Norethindrone   -Needs ultram and vistaril refilled     Interval history:  -  Has a laparoscopic scheduled 1/26/2024       Past Medical History:   Diagnosis Date    Anxiety 2004    Interstitial cystitis (chronic) without hematuria 11/12/2020    Cystitis, interstitial    Other specified disorders of nose and nasal sinuses 11/12/2020    Nasal hypertrophy    Personal history of diseases of the skin and subcutaneous tissue 11/12/2020    History of folliculitis    Substance abuse (CMS/[facility]) Sober from alcohol since July 2016    Urinary tract infection 2005          Past Surgical History:       Past Surgical History:   Procedure Laterality Date    CYSTOSCOPY           Medications:       Prior to Admission medications    Medication Sig Start Date End Date Taking? Authorizing Provider   amitriptyline (Elavil) 50 mg tablet Take 1 tablet (50 mg) by mouth once daily at bedtime. 4/26/21   Historical Provider, MD   azelaic acid (Finacea) 15 % gel Azelaic Acid 15 % External Gel   Quantity: 50  Refills: 0        Start : 24-Feb-2022   Active 2/24/22   Historical Provider, MD   azelaic acid (Finacea) 15 % gel Apply to affected areas once daily 12/20/23   Radha Ji MD   buPROPion XL (Wellbutrin XL) 150 mg 24 hr tablet Take 1 tablet (150 mg) by mouth once daily in the morning. Do not crush, chew, or split. 11/20/23 5/18/24  Josephine Kapoor,    hydrOXYzine HCL (Atarax) 25 mg tablet Take 1 tablet (25 mg) by mouth once  daily at bedtime. 12/16/20   Historical Provider, MD   ketoconazole (NIZOral) 2 % shampoo Ketoconazole 2 % External Shampoo   Quantity: 120  Refills: 0        Start : 16-Mar-2022   Active 3/16/22   Historical Provider, MD   ketoconazole (NIZOral) 2 % shampoo Apply to the affected areas once daily every other day. Let sit for 2 minutes, rinse thoroughly. 12/20/23   Radha Ji MD   norethindrone (Aygestin) 5 mg tablet Take 1 tablet (5 mg) by mouth once daily. 9/13/23   Historical Provider, MD   solifenacin (VESIcare) 10 mg tablet Take 1 tablet (10 mg) by mouth once daily. 10/30/23   Randi Tidwell MD   sulfacetamide sodium-sulfur 9.8-4.8 % cleanser Apply to the affected areas once daily every other day. Let sit for 2 minutes, rinse thoroughly. 12/20/23   Radha Ji MD   tamsulosin (Flomax) 0.4 mg 24 hr capsule Take 1 capsule (0.4 mg) by mouth once daily at bedtime. 10/18/20   Historical Provider, MD   traMADol (Ultram) 50 mg tablet Take 1 tablet (50 mg) by mouth 3 times a day as needed for moderate pain (4 - 6). 10/30/23   Randi Tidwell MD   tretinoin (Retin-A) 0.025 % cream Apply a thin layer to affected area at bedtime as tolerated. 12/20/23   Radha Ji MD   UribeL 118-10-40.8-36 mg capsule Take 1 capsule by mouth every 6 hours if needed. 5/11/22   Historical Provider, MD ALMANZA  Review of Systems       Data and DIAGNOSTIC STUDIES REVIEWED   No results found.   Lab Results   Component Value Date    URINECULTURE NO GROWTH 09/11/2023      Lab Results   Component Value Date    GLUCOSE 78 07/21/2023    CALCIUM 9.5 07/21/2023     07/21/2023    K 4.7 07/21/2023    CO2 28 07/21/2023     07/21/2023    BUN 10 07/21/2023    CREATININE 0.74 07/21/2023     Lab Results   Component Value Date    WBC 4.4 07/21/2023    HGB 13.1 07/21/2023    HCT 40.2 07/21/2023    MCV 94 07/21/2023     07/21/2023      ASSESSMENT &PLAN     Assessment:   38 y.o. old  female being assessed for pelvic pain and IC    Diagnoses:   #1 Pelvic pain  #2 IC    Plan:   Pelvic pain, IC  - Refilled Ultram 50MG PO TID PRN  - Refilled Vistaril 25MG PO at bedtime      Follow-up as scheduled with Dr. Yaw Astorga Attestation  By signing my name below, I, Sary Rizwan Khan   attest that this documentation has been prepared under the direction and in the presence of Randi Tidwell MD.     A telephone visit (audio only) between the patient (at the originating site) and the provider (at the distant site) was utilized to provide this telehealth service. Verbal consent was requested and obtained from [patient fn and ln] on this date for a telehealth visit.     Phone call visit today: The patient's identity was confirmed and that she is located in [place]. Dr. Tidwell identified herself and the patient consented to a telehealth visit today. Telephone visit time: 8 minutes   12 min total.  Agree with above   I Dr. Tidwell, personally performed the services described in the documentation which was scribed virtually and confirm it is both complete and accurate.  Randi Tidwell MD

## 2024-01-24 LAB — BACTERIA UR CULT: NO GROWTH

## 2024-03-04 DIAGNOSIS — G89.29 CHRONIC PELVIC PAIN IN FEMALE: Primary | ICD-10-CM

## 2024-03-04 DIAGNOSIS — R10.2 CHRONIC PELVIC PAIN IN FEMALE: Primary | ICD-10-CM

## 2024-03-04 RX ORDER — AMITRIPTYLINE HYDROCHLORIDE 10 MG/1
75 TABLET, FILM COATED ORAL NIGHTLY
Qty: 30 TABLET | Refills: 3 | Status: SHIPPED | OUTPATIENT
Start: 2024-03-04 | End: 2024-04-10 | Stop reason: SDUPTHER

## 2024-03-07 DIAGNOSIS — M79.18 MYOFASCIAL PAIN SYNDROME: Primary | ICD-10-CM

## 2024-03-07 RX ORDER — AMITRIPTYLINE HYDROCHLORIDE 75 MG/1
75 TABLET ORAL NIGHTLY
Qty: 30 TABLET | Refills: 5 | Status: SHIPPED | OUTPATIENT
Start: 2024-03-07 | End: 2025-03-07

## 2024-03-09 DIAGNOSIS — F32.1 CURRENT MODERATE EPISODE OF MAJOR DEPRESSIVE DISORDER WITHOUT PRIOR EPISODE (MULTI): ICD-10-CM

## 2024-03-12 RX ORDER — BUPROPION HYDROCHLORIDE 150 MG/1
150 TABLET ORAL EVERY MORNING
Qty: 90 TABLET | Refills: 1 | Status: SHIPPED | OUTPATIENT
Start: 2024-03-12 | End: 2024-04-10 | Stop reason: ALTCHOICE

## 2024-04-03 ENCOUNTER — PATIENT MESSAGE (OUTPATIENT)
Dept: OBSTETRICS AND GYNECOLOGY | Facility: CLINIC | Age: 39
End: 2024-04-03
Payer: COMMERCIAL

## 2024-04-03 DIAGNOSIS — G89.29 CHRONIC PELVIC PAIN IN FEMALE: ICD-10-CM

## 2024-04-03 DIAGNOSIS — R10.2 CHRONIC PELVIC PAIN IN FEMALE: ICD-10-CM

## 2024-04-04 RX ORDER — TRAMADOL HYDROCHLORIDE 50 MG/1
50 TABLET ORAL 3 TIMES DAILY PRN
Qty: 60 TABLET | Refills: 0 | Status: SHIPPED | OUTPATIENT
Start: 2024-04-04

## 2024-04-04 RX ORDER — METHENAMINE, SODIUM PHOSPHATE, MONOBASIC, MONOHYDRATE, PHENYL SALICYLATE, METHYLENE BLUE, AND HYOSCYAMINE SULFATE 118; 40.8; 36; 10; .12 MG/1; MG/1; MG/1; MG/1; MG/1
1 CAPSULE ORAL EVERY 6 HOURS PRN
Qty: 60 CAPSULE | Refills: 0 | Status: SHIPPED | OUTPATIENT
Start: 2024-04-04

## 2024-04-10 ENCOUNTER — OFFICE VISIT (OUTPATIENT)
Dept: PRIMARY CARE | Facility: CLINIC | Age: 39
End: 2024-04-10
Payer: COMMERCIAL

## 2024-04-10 VITALS
OXYGEN SATURATION: 100 % | SYSTOLIC BLOOD PRESSURE: 112 MMHG | BODY MASS INDEX: 23.19 KG/M2 | HEIGHT: 68 IN | TEMPERATURE: 98 F | DIASTOLIC BLOOD PRESSURE: 78 MMHG | HEART RATE: 80 BPM | RESPIRATION RATE: 16 BRPM | WEIGHT: 153 LBS

## 2024-04-10 DIAGNOSIS — G89.29 CHRONIC PELVIC PAIN IN FEMALE: ICD-10-CM

## 2024-04-10 DIAGNOSIS — N30.10 INTERSTITIAL CYSTITIS: Primary | ICD-10-CM

## 2024-04-10 DIAGNOSIS — R10.2 CHRONIC PELVIC PAIN IN FEMALE: ICD-10-CM

## 2024-04-10 DIAGNOSIS — D80.1 HYPOGAMMAGLOBULINEMIA (MULTI): ICD-10-CM

## 2024-04-10 DIAGNOSIS — F33.0 MILD EPISODE OF RECURRENT MAJOR DEPRESSIVE DISORDER (CMS-HCC): ICD-10-CM

## 2024-04-10 PROCEDURE — 99213 OFFICE O/P EST LOW 20 MIN: CPT | Performed by: INTERNAL MEDICINE

## 2024-04-10 PROCEDURE — 1036F TOBACCO NON-USER: CPT | Performed by: INTERNAL MEDICINE

## 2024-04-10 RX ORDER — NITROFURANTOIN (MACROCRYSTALS) 100 MG/1
CAPSULE ORAL
COMMUNITY
Start: 2024-01-15

## 2024-04-10 RX ORDER — SERTRALINE HYDROCHLORIDE 50 MG/1
50 TABLET, FILM COATED ORAL DAILY
Qty: 30 TABLET | Refills: 11 | Status: SHIPPED | OUTPATIENT
Start: 2024-04-10 | End: 2025-04-10

## 2024-04-10 ASSESSMENT — ENCOUNTER SYMPTOMS
SHORTNESS OF BREATH: 0
FEVER: 0
COUGH: 0
FATIGUE: 0

## 2024-04-10 NOTE — PROGRESS NOTES
"Subjective   Vianney Miner is a 38 y.o. female who presents for 6 month Follow-up.    HPI   Had Endometrial surgery in jan.  Recovered well.  Feeling better.  Occasional pelvic pain, has improved.  URO-GYN increased amitryptylline, effective    Depression sx's stable w/Wellbutrin.  Plans to conceive.  Wanting to discuss changing med.    Review of Systems   Constitutional:  Negative for fatigue and fever.   Respiratory:  Negative for cough and shortness of breath.    Cardiovascular:  Negative for chest pain and leg swelling.   All other systems reviewed and are negative.      Health Maintenance Due   Topic Date Due    Yearly Adult Physical  Never done    HIV Screening  Never done    Hepatitis C Screening  Never done    Hepatitis B Vaccines (1 of 3 - 19+ 3-dose series) Never done    Cervical Cancer Screening  Never done    Varicella Vaccines (1 of 2 - 13+ 2-dose series) Never done       Objective   /78   Pulse 80   Temp 36.7 °C (98 °F)   Resp 16   Ht 1.727 m (5' 8\")   Wt 69.4 kg (153 lb)   SpO2 100%   BMI 23.26 kg/m²     Physical Exam  Vitals and nursing note reviewed.   Constitutional:       Appearance: Normal appearance.   HENT:      Head: Normocephalic.   Eyes:      Conjunctiva/sclera: Conjunctivae normal.      Pupils: Pupils are equal, round, and reactive to light.   Cardiovascular:      Rate and Rhythm: Normal rate and regular rhythm.      Pulses: Normal pulses.      Heart sounds: Normal heart sounds.   Pulmonary:      Effort: Pulmonary effort is normal.      Breath sounds: Normal breath sounds.   Musculoskeletal:         General: No swelling.      Cervical back: Neck supple.   Skin:     General: Skin is warm and dry.   Neurological:      General: No focal deficit present.      Mental Status: She is oriented to person, place, and time.         Assessment/Plan   Problem List Items Addressed This Visit       Chronic pelvic pain in female    Depression    Relevant Medications    sertraline " (Zoloft) 50 mg tablet    Hypogammaglobulinemia (CMS/HCC)    Interstitial cystitis - Primary   Dc wellbutrin  Start sertraline 25 then 50 call in 6 weeks  Stable based on symptoms and exam.  Continue established treatment plan and follow up at least yearly.

## 2024-04-21 NOTE — PROGRESS NOTES
Urogynecology  Provider:  Randi Tidwell MD  [phone]              ASSESSMENT AND PLAN:     Problem List Items Addressed This Visit    None          I spent a total of 10 minutes in face to face and non face to face time at this virtual visit.          Randi Tidwell MD        HISTORY OF PRESENT ILLNESS:     Last visit 1/2024  Q3 month check in  Diagnoses:   #1 Pelvic pain  #2 IC     Plan:   Pelvic pain, IC  - Refilled Ultram 50MG PO TID PRN  - Refilled Vistaril 25MG PO at bedtime     She did have surgery with Dr. Huggins and this went well. She had some endometriosis and they removed it. All went overall well.  She is overall doing better she believes.  She is starting to stop some of her meds.    She is thinking about getting pregnant in the near future.    She is in a good place overall.    She will follow up with me virtually in 3 months.    Her opioid contract is good until October.          Record Review:     Prolapse Symptoms :     Urinary Symptoms:     Bowel Symptoms:     Sexual Activity:          Past Medical History:       Past Medical History:   Diagnosis Date    Anxiety 2004    Interstitial cystitis (chronic) without hematuria 11/12/2020    Cystitis, interstitial    Other specified disorders of nose and nasal sinuses 11/12/2020    Nasal hypertrophy    Personal history of diseases of the skin and subcutaneous tissue 11/12/2020    History of folliculitis    Substance abuse (Multi) Sober from alcohol since July 2016    Urinary tract infection 2005          Past Surgical History:       Past Surgical History:   Procedure Laterality Date    CYSTOSCOPY           Medications:       Prior to Admission medications    Medication Sig Start Date End Date Taking? Authorizing Provider   amitriptyline (Elavil) 75 mg tablet Take 1 tablet (75 mg) by mouth once daily at bedtime. 3/7/24 3/7/25  Randi Tidwell MD   azelaic acid (Finacea) 15 % gel Apply to affected areas once daily 12/20/23   Radha Chavez  MD Margy   ketoconazole (NIZOral) 2 % shampoo Apply to the affected areas once daily every other day. Let sit for 2 minutes, rinse thoroughly. 12/20/23   Radha Ji MD   nitrofurantoin (Macrodantin) 100 mg capsule TAKE 1 CAPSULE BY MOUTH DAILY TAKE AFTER INTERCOURSE. 1/15/24   Historical Provider, MD   sertraline (Zoloft) 50 mg tablet Take 1 tablet (50 mg) by mouth once daily. 4/10/24 4/10/25  Josephine Kapoor DO   sulfacetamide sodium-sulfur 9.8-4.8 % cleanser Apply to the affected areas once daily every other day. Let sit for 2 minutes, rinse thoroughly. 12/20/23   Radha Ji MD   traMADol (Ultram) 50 mg tablet Take 1 tablet (50 mg) by mouth 3 times a day as needed for moderate pain (4 - 6). 4/4/24   Randi Tidwell MD   tretinoin (Retin-A) 0.025 % cream Apply a thin layer to affected area at bedtime as tolerated. 12/20/23   Radha Ji MD   UribeL 118-10-40.8-36 mg capsule Take 1 capsule by mouth every 6 hours if needed (Pelvic pain). 4/4/24   Randi Tidwell MD        ROS  Review of Systems       Data and DIAGNOSTIC STUDIES REVIEWED   No results found.   Lab Results   Component Value Date    URINECULTURE No growth 01/22/2024      Lab Results   Component Value Date    GLUCOSE 78 07/21/2023    CALCIUM 9.5 07/21/2023     07/21/2023    K 4.7 07/21/2023    CO2 28 07/21/2023     07/21/2023    BUN 10 07/21/2023    CREATININE 0.74 07/21/2023     Lab Results   Component Value Date    WBC 4.4 07/21/2023    HGB 13.1 07/21/2023    HCT 40.2 07/21/2023    MCV 94 07/21/2023     07/21/2023      Randi Tidwell MD

## 2024-04-22 ENCOUNTER — TELEMEDICINE (OUTPATIENT)
Dept: OBSTETRICS AND GYNECOLOGY | Facility: CLINIC | Age: 39
End: 2024-04-22
Payer: COMMERCIAL

## 2024-04-22 DIAGNOSIS — G89.29 CHRONIC PELVIC PAIN IN FEMALE: Primary | ICD-10-CM

## 2024-04-22 DIAGNOSIS — R10.2 CHRONIC PELVIC PAIN IN FEMALE: Primary | ICD-10-CM

## 2024-04-22 PROCEDURE — 99212 OFFICE O/P EST SF 10 MIN: CPT | Performed by: OBSTETRICS & GYNECOLOGY

## 2024-05-30 NOTE — PROGRESS NOTES
"NPV, Annual Exam    Pap: 2022 nml HPV-  Chioma: Never  LMP: 2024  CC: Possible yeast, trying to conceive, talk about SSRI's.   Had laparoscopic excision of endometriosis, cystoscopy for chronic pelvic pain on 2024 with Dr Radha Huggins at the [facility].    Radha Toussaint MA II     GYN ANNUAL EXAM    SUBJECTIVE    Vianney Miner is a 38 y.o. female who presents for annual exam today.  Her periods are regular.  She is not using contraception as she has recently started trying to conceive.  She is taking PNV for this.  She recently had laparoscopy and endometriosis was found at [facility].      She also thinks she may have a yeast infection.  She has been having itching and irritation for about the past week. She has some whitish discharge.      She also wants to discuss anti-anxiety medication use in pregnancy.  She previously was well controlled on wellbutrin but was switched to zoloft when she decided to attempt pregnancy. She does not think the zoloft is working for her and would like to go back to taking the wellbutrin but wants to discuss pros/cons of this today.      PMH - anxiety, endometriosis     PSH - laparoscopy - excision of endometriosis     OB history -   OB History    Para Term  AB Living   0 0 0 0 0 0   SAB IAB Ectopic Multiple Live Births   0 0 0 0 0     Last pap -   Normal HPV Negative-      Last mammogram - not yet indicated     Family history of breast or ovarian cancer - none     OBJECTIVE  /60 (BP Location: Right arm, Patient Position: Sitting)   Ht 1.727 m (5' 8\")   Wt 70.5 kg (155 lb 6.4 oz)   LMP 2024 (Exact Date)   Breastfeeding No   BMI 23.63 kg/m²     General Appearance   - consistent with stated age, well groomed and cooperative    Integumentary  - skin warm and dry without rash    Head and Neck  - normalocephalic and neck supple    Chest and Lung Exam  - normal breathing effort, no respiratory distress    Breast  - symmetry noted, no mass " palpable, no skin change and no nipple discharge.    Abdomen  - soft, nontender and no hepatomegaly, splenomegaly, or mass    Female Genitourinary  - vulva normal without rash or lesion, normal vaginal rugae, white/chunky vaginal discharge c/w yeast,  uterus normal size & no palpable masses, no adnexal mass, no adnexal tenderness, no cervical motion tenderness    Peripheral Vascular  - no edema present    ASSESSMENT/PLAN  38 y.o.  female who presents for annual exam.       Actions performed during this visit include:  - Clinical breast exam  - Clinical pelvic exam  - Pap- UTD and not indicated   - Mammogram- not yet indicated   - Contraception - Declines.  Trying to conceive. Taking PNV. Discussed SSRI/SNRI use in pregnancy and associated risks.  Ultimately, she will likely switch back to wellbutrin which was working better for her symptoms.  She will discuss with her PCP.   - STI screening  - Declines  - Yeast infection - prescription for diflucan sent to her pharmacy.     Please return for your next visit in 1 year or sooner as needed.     Caryn Cooper MD

## 2024-05-31 ENCOUNTER — OFFICE VISIT (OUTPATIENT)
Dept: OBSTETRICS AND GYNECOLOGY | Facility: CLINIC | Age: 39
End: 2024-05-31
Payer: COMMERCIAL

## 2024-05-31 VITALS
DIASTOLIC BLOOD PRESSURE: 60 MMHG | SYSTOLIC BLOOD PRESSURE: 106 MMHG | HEIGHT: 68 IN | WEIGHT: 155.4 LBS | BODY MASS INDEX: 23.55 KG/M2

## 2024-05-31 DIAGNOSIS — B37.9 YEAST INFECTION: ICD-10-CM

## 2024-05-31 DIAGNOSIS — Z01.419 ENCOUNTER FOR ANNUAL ROUTINE GYNECOLOGICAL EXAMINATION: Primary | ICD-10-CM

## 2024-05-31 PROCEDURE — 1036F TOBACCO NON-USER: CPT | Performed by: OBSTETRICS & GYNECOLOGY

## 2024-05-31 PROCEDURE — 99395 PREV VISIT EST AGE 18-39: CPT | Performed by: OBSTETRICS & GYNECOLOGY

## 2024-05-31 RX ORDER — FLUCONAZOLE 150 MG/1
150 TABLET ORAL ONCE
Qty: 2 TABLET | Refills: 0 | Status: SHIPPED | OUTPATIENT
Start: 2024-05-31 | End: 2024-05-31

## 2024-05-31 ASSESSMENT — PAIN SCALES - GENERAL: PAINLEVEL: 0-NO PAIN

## 2024-06-04 DIAGNOSIS — F33.1 MODERATE EPISODE OF RECURRENT MAJOR DEPRESSIVE DISORDER (MULTI): Primary | ICD-10-CM

## 2024-06-04 RX ORDER — BUPROPION HYDROCHLORIDE 150 MG/1
150 TABLET ORAL EVERY MORNING
Qty: 30 TABLET | Refills: 5 | Status: SHIPPED | OUTPATIENT
Start: 2024-06-04 | End: 2024-12-01

## 2024-06-11 DIAGNOSIS — N76.0 RECURRENT VAGINITIS: Primary | ICD-10-CM

## 2024-06-11 RX ORDER — TERCONAZOLE 80 MG/1
80 SUPPOSITORY VAGINAL NIGHTLY
Qty: 3 SUPPOSITORY | Refills: 0 | Status: SHIPPED | OUTPATIENT
Start: 2024-06-11 | End: 2024-06-14

## 2024-06-12 DIAGNOSIS — B37.9 YEAST INFECTION: Primary | ICD-10-CM

## 2024-06-12 RX ORDER — MICONAZOLE NITRATE 2 %
1 CREAM WITH APPLICATOR VAGINAL NIGHTLY
Qty: 1 G | Refills: 0 | Status: SHIPPED | OUTPATIENT
Start: 2024-06-12 | End: 2024-06-19

## 2024-06-18 ENCOUNTER — TELEPHONE (OUTPATIENT)
Dept: DERMATOLOGY | Facility: CLINIC | Age: 39
End: 2024-06-18
Payer: COMMERCIAL

## 2024-06-18 NOTE — TELEPHONE ENCOUNTER
Pt contacted office requesting refill of sulfa cleanser from [facility].  Pt was last seen 12/2023  pt is currently pregnant.    Prateek Patel LPN

## 2024-06-19 NOTE — TELEPHONE ENCOUNTER
Pt was notified via MyChart that medication was sent to [facility].    Prateek Patel LPN     Never

## 2024-06-28 NOTE — PROGRESS NOTES
Patient presents for initial OB visit  LMP: 2024  C/O: None     Urine culture sent today.    Radha Toussaint MA II    38 y.o.  at 7.2 weeks gestational age by sure LMP.  Planned and desired pregnancy.  USN done today - CRL c/w LMP dating.  EDC per LMP - 2/15.  PMH complicated by depression - overall controlled on welbutrin.  Managed by PCP.   Has h/o endometriosis/adenomyosis.  Takes tramadol occasionally for back pain/chronic pain. Has nausea but tolerating.  Prescription for zofran sent.  Taking B6 and unisom too which helps some.  Taking OTC PNV.  BMI today Body mass index is 23.75 kg/m². Discussed optimal weight gain in pregnancy.  Discussed genetic screening options- likely wants NIPS.  Had carrier screening done previously which was negative.   Start ASA at 12 weeks.  Hemoglobin A1c next visit with routine prenatal labs and likely NIPS.  Discussed collaborative care model and group practice.  Questions answered.

## 2024-07-01 ENCOUNTER — APPOINTMENT (OUTPATIENT)
Dept: OBSTETRICS AND GYNECOLOGY | Facility: CLINIC | Age: 39
End: 2024-07-01
Payer: COMMERCIAL

## 2024-07-01 VITALS — SYSTOLIC BLOOD PRESSURE: 132 MMHG | DIASTOLIC BLOOD PRESSURE: 74 MMHG | BODY MASS INDEX: 23.75 KG/M2 | WEIGHT: 156.2 LBS

## 2024-07-01 DIAGNOSIS — Z34.01 ENCOUNTER FOR SUPERVISION OF NORMAL FIRST PREGNANCY IN FIRST TRIMESTER (HHS-HCC): Primary | ICD-10-CM

## 2024-07-01 DIAGNOSIS — F32.A DEPRESSION AFFECTING PREGNANCY IN FIRST TRIMESTER, ANTEPARTUM (HHS-HCC): ICD-10-CM

## 2024-07-01 DIAGNOSIS — Z3A.01 7 WEEKS GESTATION OF PREGNANCY (HHS-HCC): ICD-10-CM

## 2024-07-01 DIAGNOSIS — Z01.419 WELL WOMAN EXAM WITH ROUTINE GYNECOLOGICAL EXAM: ICD-10-CM

## 2024-07-01 DIAGNOSIS — O09.519 ENCOUNTER FOR SUPERVISION OF HIGH RISK PREGNANCY DUE TO ADVANCED MATERNAL AGE IN PRIMIGRAVIDA (HHS-HCC): ICD-10-CM

## 2024-07-01 DIAGNOSIS — O99.341 DEPRESSION AFFECTING PREGNANCY IN FIRST TRIMESTER, ANTEPARTUM (HHS-HCC): ICD-10-CM

## 2024-07-01 PROBLEM — M99.03 SEGMENTAL AND SOMATIC DYSFUNCTION OF LUMBAR REGION: Status: RESOLVED | Noted: 2023-03-12 | Resolved: 2024-07-01

## 2024-07-01 PROBLEM — M99.05 SEGMENTAL AND SOMATIC DYSFUNCTION OF PELVIC REGION: Status: RESOLVED | Noted: 2023-03-12 | Resolved: 2024-07-01

## 2024-07-01 PROBLEM — N76.0 RECURRENT VAGINITIS: Status: RESOLVED | Noted: 2022-01-18 | Resolved: 2024-07-01

## 2024-07-01 PROBLEM — D80.1 HYPOGAMMAGLOBULINEMIA (MULTI): Status: RESOLVED | Noted: 2020-02-10 | Resolved: 2024-07-01

## 2024-07-01 PROBLEM — M99.04 SEGMENTAL AND SOMATIC DYSFUNCTION OF SACRAL REGION: Status: RESOLVED | Noted: 2023-03-12 | Resolved: 2024-07-01

## 2024-07-01 PROBLEM — M53.3 SACROILIAC DYSFUNCTION: Status: RESOLVED | Noted: 2023-03-12 | Resolved: 2024-07-01

## 2024-07-01 PROCEDURE — 0500F INITIAL PRENATAL CARE VISIT: CPT | Performed by: OBSTETRICS & GYNECOLOGY

## 2024-07-01 PROCEDURE — 87086 URINE CULTURE/COLONY COUNT: CPT

## 2024-07-02 LAB — BACTERIA UR CULT: NO GROWTH

## 2024-07-05 DIAGNOSIS — B37.9 YEAST INFECTION: Primary | ICD-10-CM

## 2024-07-05 DIAGNOSIS — O21.9 NAUSEA AND VOMITING IN PREGNANCY PRIOR TO 22 WEEKS GESTATION (HHS-HCC): ICD-10-CM

## 2024-07-05 RX ORDER — FLUCONAZOLE 150 MG/1
150 TABLET ORAL ONCE
Qty: 1 TABLET | Refills: 0 | Status: SHIPPED | OUTPATIENT
Start: 2024-07-05 | End: 2024-07-05

## 2024-07-05 RX ORDER — ONDANSETRON 4 MG/1
4 TABLET, FILM COATED ORAL EVERY 8 HOURS PRN
Qty: 20 TABLET | Refills: 2 | Status: SHIPPED | OUTPATIENT
Start: 2024-07-05 | End: 2024-08-04

## 2024-07-08 ENCOUNTER — APPOINTMENT (OUTPATIENT)
Dept: PRIMARY CARE | Facility: CLINIC | Age: 39
End: 2024-07-08
Payer: COMMERCIAL

## 2024-07-08 VITALS
TEMPERATURE: 98 F | WEIGHT: 160 LBS | HEIGHT: 68 IN | BODY MASS INDEX: 24.25 KG/M2 | RESPIRATION RATE: 16 BRPM | HEART RATE: 80 BPM | OXYGEN SATURATION: 100 % | DIASTOLIC BLOOD PRESSURE: 70 MMHG | SYSTOLIC BLOOD PRESSURE: 116 MMHG

## 2024-07-08 DIAGNOSIS — F41.9 ANXIETY: Primary | ICD-10-CM

## 2024-07-08 DIAGNOSIS — Z34.01 ENCOUNTER FOR SUPERVISION OF NORMAL FIRST PREGNANCY IN FIRST TRIMESTER (HHS-HCC): ICD-10-CM

## 2024-07-08 PROCEDURE — 1036F TOBACCO NON-USER: CPT | Performed by: INTERNAL MEDICINE

## 2024-07-08 PROCEDURE — 99213 OFFICE O/P EST LOW 20 MIN: CPT | Performed by: INTERNAL MEDICINE

## 2024-07-08 RX ORDER — SERTRALINE HYDROCHLORIDE 50 MG/1
50 TABLET, FILM COATED ORAL DAILY
Qty: 30 TABLET | Refills: 11 | Status: SHIPPED | OUTPATIENT
Start: 2024-07-08 | End: 2025-07-08

## 2024-07-08 ASSESSMENT — ENCOUNTER SYMPTOMS
FEVER: 0
SHORTNESS OF BREATH: 0
FATIGUE: 0
COUGH: 0

## 2024-07-08 NOTE — PROGRESS NOTES
"Subjective   Vianney Miner is a 38 y.o. female who presents for Depression.    HPI   Pt pregnant.  ABBEY 2/15/25  Stopped zoloft beginning of June  Taking Wellbutrin as Rx'd.  Feeling irritable, crying more frequently, increase in Anxiety.    Review of Systems   Constitutional:  Negative for fatigue and fever.   Respiratory:  Negative for cough and shortness of breath.    Cardiovascular:  Negative for chest pain and leg swelling.   All other systems reviewed and are negative.      Health Maintenance Due   Topic Date Due    HIV Screening  Never done    Hepatitis C Screening  Never done    Hepatitis B Vaccines (1 of 3 - 19+ 3-dose series) Never done    Cervical Cancer Screening  Never done    Varicella Vaccines (1 of 2 - 13+ 2-dose series) Never done       Objective   /70   Pulse 80   Temp 36.7 °C (98 °F)   Resp 16   Ht 1.727 m (5' 8\")   Wt 72.6 kg (160 lb)   LMP 05/11/2024   SpO2 100%   BMI 24.33 kg/m²     Physical Exam  Vitals and nursing note reviewed.   Constitutional:       Appearance: Normal appearance.   HENT:      Head: Normocephalic.   Eyes:      Conjunctiva/sclera: Conjunctivae normal.      Pupils: Pupils are equal, round, and reactive to light.   Cardiovascular:      Rate and Rhythm: Normal rate and regular rhythm.      Pulses: Normal pulses.      Heart sounds: Normal heart sounds.   Pulmonary:      Effort: Pulmonary effort is normal.      Breath sounds: Normal breath sounds.   Musculoskeletal:         General: No swelling.      Cervical back: Neck supple.   Skin:     General: Skin is warm and dry.   Neurological:      General: No focal deficit present.      Mental Status: She is oriented to person, place, and time.         Assessment/Plan   Problem List Items Addressed This Visit       Encounter for supervision of normal first pregnancy in first trimester ([facility])     Other Visit Diagnoses       Anxiety    -  Primary    Relevant Medications    sertraline (Zoloft) 50 mg tablet      "       Cont welbutrin   Low dose sertraline 25mg  Call in one month  Fu in 3 months

## 2024-07-14 ENCOUNTER — LAB REQUISITION (OUTPATIENT)
Dept: LAB | Facility: HOSPITAL | Age: 39
End: 2024-07-14
Payer: COMMERCIAL

## 2024-07-14 DIAGNOSIS — R30.0 DYSURIA: ICD-10-CM

## 2024-07-14 PROCEDURE — 87086 URINE CULTURE/COLONY COUNT: CPT

## 2024-07-16 LAB — BACTERIA UR CULT: NO GROWTH

## 2024-07-21 NOTE — PROGRESS NOTES
Urogynecology  Provider:  Randi Tidwell MD  [phone]              ASSESSMENT AND PLAN:     38-year-old being assessed virtually for pelvic pain. Pt currently pregnant with first child.    Diagnoses:  #1 Pelvic pain  #2 pregnant    Plan:  Pelvic pain  - The patient is experiencing minor flare-ups but finds them manageable.  - She has taken tramadol sparingly and is concerned about use during pregnancy  - Advised to avoid frequent tramadol use and consider natural relief if necessary, especially during pregnancy.  - Continue with PFPT to manage sx.    Follow-up with Dr. Tidwell in 3 months.    Scribe Attestation  By signing my name below, IAlfonso, Rizwan, attest that this documentation has been prepared under the direction and in the presence of Randi Tidwell MD on 07/22/2024 at 6:36 PM.     Virtual visit today: The patient's identity was confirmed and that she is located in [place].   Randi Tidwell MD identified herself and the patient consented to a telehealth visit today. Telehealth visit time: 6 minutes    Agree with above. I Dr. Tidwell, personally performed the services described in the documentation which was scribed virtually and confirm it is both complete and accurate.  Randi Tidwell MD      Problem List Items Addressed This Visit    None          I spent a total of 10 minutes in face to face and non face to face time at this virtual visit.          Randi Tidwell MD        HISTORY OF PRESENT ILLNESS:     38-year-old being assessed virtually for pelvic pain.    Last visit 4/2024  Diagnoses:   #1 Pelvic pain  #2 IC     Plan:   Pelvic pain, IC  - Refilled Ultram 50MG PO TID PRN  - Refilled Vistaril 25MG PO at bedtime      She did have surgery with Dr. Huggins and this went well. She had some endometriosis and they removed it. All went overall well.  She is overall doing better she believes.  She is starting to stop some of her meds.            Urinary Symptoms:   - Last week,  she suspected a UTI, but it was not confirmed.  - She describes her symptoms as manageable and better than before.    Pelvic Symptoms:  - She has been taking tramadol sparingly, about four times, and is concerned about dependency.  - She is currently undergoing PFPT and hopes to avoid returning to medication.  - She recently tapered off amitriptyline as per her OBGYN's advice and has experienced minor flare-ups occasionally.    Bowel Symptoms:   - She is also experiencing nausea, which is her primary concern.    OBGYN History and Sexual Activity:   - She has been off birth control for some time due to bladder issues.       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    Interstitial cystitis (chronic) without hematuria 11/12/2020    Cystitis, interstitial    Other specified disorders of nose and nasal sinuses 11/12/2020    Nasal hypertrophy    Personal history of diseases of the skin and subcutaneous tissue 11/12/2020    History of folliculitis    Substance abuse (Multi) Sober from alcohol since July 2016    Urinary tract infection 2005    Uterine anomaly           Past Surgical History:       Past Surgical History:   Procedure Laterality Date    CYSTOSCOPY      ENDOMETRIAL ABLATION  01/2024         Medications:       Prior to Admission medications    Medication Sig Start Date End Date Taking? Authorizing Provider   azelaic acid (Finacea) 15 % gel Apply to affected areas once daily 12/20/23   Radha Ji MD   buPROPion XL (Wellbutrin XL) 150 mg 24 hr tablet Take 1 tablet (150 mg) by mouth once daily in the morning. Do not crush, chew, or split. 6/4/24 12/1/24  Josephine Kapoor,    ketoconazole (NIZOral) 2 % shampoo Apply to the affected areas once daily every other day. Let sit for 2 minutes, rinse  thoroughly. 12/20/23   aRdha Ji MD   nitrofurantoin (Macrodantin) 100 mg capsule TAKE 1 CAPSULE BY MOUTH DAILY TAKE AFTER INTERCOURSE. 1/15/24   Historical Provider, MD   ondansetron (Zofran) 4 mg tablet Take 1 tablet (4 mg) by mouth every 8 hours if needed for nausea or vomiting. 7/5/24 8/4/24  Caryn Cooper MD   sertraline (Zoloft) 50 mg tablet Take 1 tablet (50 mg) by mouth once daily. 7/8/24 7/8/25  Josephine Kapoor DO   sulfacetamide sodium-sulfur 9.8-4.8 % cleanser Apply to the affected areas once daily every other day. Let sit for 2 minutes, rinse thoroughly. 12/20/23   Radha Ji MD   traMADol (Ultram) 50 mg tablet Take 1 tablet (50 mg) by mouth 3 times a day as needed for moderate pain (4 - 6). 4/4/24   Randi Tidwell MD   UribeL 118-10-40.8-36 mg capsule Take 1 capsule by mouth every 6 hours if needed (Pelvic pain).  Patient not taking: Reported on 7/8/2024 4/4/24   Randi Tidwell MD        ROS  Review of Systems   Constitutional: Negative.    HENT: Negative.     Eyes: Negative.    Respiratory: Negative.     Cardiovascular: Negative.    Gastrointestinal: Negative.    Endocrine: Negative.    Genitourinary:         Undergoing PFPT. Minor pelvic pain flare-ups, no confirmed UTIs recently.    Musculoskeletal: Negative.    Neurological: Negative.    Psychiatric/Behavioral: Negative.            Data and DIAGNOSTIC STUDIES REVIEWED   No results found.   Lab Results   Component Value Date    URINECULTURE No growth 07/14/2024      Lab Results   Component Value Date    GLUCOSE 78 07/21/2023    CALCIUM 9.5 07/21/2023     07/21/2023    K 4.7 07/21/2023    CO2 28 07/21/2023     07/21/2023    BUN 10 07/21/2023    CREATININE 0.74 07/21/2023     Lab Results   Component Value Date    WBC 4.4 07/21/2023    HGB 13.1 07/21/2023    HCT 40.2 07/21/2023    MCV 94 07/21/2023     07/21/2023

## 2024-07-22 ENCOUNTER — TELEMEDICINE (OUTPATIENT)
Dept: OBSTETRICS AND GYNECOLOGY | Facility: CLINIC | Age: 39
End: 2024-07-22
Payer: COMMERCIAL

## 2024-07-22 DIAGNOSIS — G89.29 CHRONIC PELVIC PAIN IN FEMALE: Primary | ICD-10-CM

## 2024-07-22 DIAGNOSIS — R10.2 CHRONIC PELVIC PAIN IN FEMALE: Primary | ICD-10-CM

## 2024-07-22 PROCEDURE — 99212 OFFICE O/P EST SF 10 MIN: CPT | Performed by: OBSTETRICS & GYNECOLOGY

## 2024-07-22 ASSESSMENT — ENCOUNTER SYMPTOMS
ENDOCRINE NEGATIVE: 1
NEUROLOGICAL NEGATIVE: 1
CONSTITUTIONAL NEGATIVE: 1
MUSCULOSKELETAL NEGATIVE: 1
RESPIRATORY NEGATIVE: 1
PSYCHIATRIC NEGATIVE: 1
GASTROINTESTINAL NEGATIVE: 1
EYES NEGATIVE: 1
CARDIOVASCULAR NEGATIVE: 1

## 2024-08-09 ENCOUNTER — APPOINTMENT (OUTPATIENT)
Dept: OBSTETRICS AND GYNECOLOGY | Facility: CLINIC | Age: 39
End: 2024-08-09
Payer: COMMERCIAL

## 2024-08-09 ENCOUNTER — LAB REQUISITION (OUTPATIENT)
Dept: LAB | Facility: HOSPITAL | Age: 39
End: 2024-08-09
Payer: COMMERCIAL

## 2024-08-09 DIAGNOSIS — R81 GLYCOSURIA: ICD-10-CM

## 2024-08-09 DIAGNOSIS — R30.0 DYSURIA: ICD-10-CM

## 2024-08-09 PROCEDURE — 87086 URINE CULTURE/COLONY COUNT: CPT

## 2024-08-10 ASSESSMENT — ENCOUNTER SYMPTOMS
VOMITING: 1
CONSTIPATION: 1
FLANK PAIN: 0
HEADACHES: 1
DYSURIA: 1
ABDOMINAL PAIN: 0
FEVER: 0
ANOREXIA: 0
NAUSEA: 1
CHILLS: 0
DIARRHEA: 0
SORE THROAT: 0
HEMATURIA: 0
BACK PAIN: 0
FREQUENCY: 1

## 2024-08-11 LAB — BACTERIA UR CULT: NO GROWTH

## 2024-08-12 ENCOUNTER — LAB (OUTPATIENT)
Dept: LAB | Facility: LAB | Age: 39
End: 2024-08-12
Payer: COMMERCIAL

## 2024-08-12 ENCOUNTER — APPOINTMENT (OUTPATIENT)
Dept: OBSTETRICS AND GYNECOLOGY | Facility: CLINIC | Age: 39
End: 2024-08-12
Payer: COMMERCIAL

## 2024-08-12 VITALS — DIASTOLIC BLOOD PRESSURE: 75 MMHG | WEIGHT: 169.6 LBS | BODY MASS INDEX: 25.79 KG/M2 | SYSTOLIC BLOOD PRESSURE: 118 MMHG

## 2024-08-12 DIAGNOSIS — O99.341 DEPRESSION AFFECTING PREGNANCY IN FIRST TRIMESTER, ANTEPARTUM (HHS-HCC): ICD-10-CM

## 2024-08-12 DIAGNOSIS — F32.A DEPRESSION AFFECTING PREGNANCY IN FIRST TRIMESTER, ANTEPARTUM (HHS-HCC): ICD-10-CM

## 2024-08-12 DIAGNOSIS — Z34.90 PRENATAL CARE, ANTEPARTUM (HHS-HCC): ICD-10-CM

## 2024-08-12 DIAGNOSIS — O21.9 NAUSEA AND VOMITING IN PREGNANCY PRIOR TO 22 WEEKS GESTATION (HHS-HCC): Primary | ICD-10-CM

## 2024-08-12 DIAGNOSIS — O09.519 ENCOUNTER FOR SUPERVISION OF HIGH RISK PREGNANCY DUE TO ADVANCED MATERNAL AGE IN PRIMIGRAVIDA (HHS-HCC): ICD-10-CM

## 2024-08-12 DIAGNOSIS — Z3A.13 13 WEEKS GESTATION OF PREGNANCY (HHS-HCC): ICD-10-CM

## 2024-08-12 LAB
ABO GROUP (TYPE) IN BLOOD: NORMAL
ANTIBODY SCREEN: NORMAL
ERYTHROCYTE [DISTWIDTH] IN BLOOD BY AUTOMATED COUNT: 12.7 % (ref 11.5–14.5)
EST. AVERAGE GLUCOSE BLD GHB EST-MCNC: 91 MG/DL
HBA1C MFR BLD: 4.8 %
HBV SURFACE AG SERPL QL IA: NONREACTIVE
HCT VFR BLD AUTO: 37.6 % (ref 36–46)
HCV AB SER QL: NONREACTIVE
HGB BLD-MCNC: 12.4 G/DL (ref 12–16)
HIV 1+2 AB+HIV1 P24 AG SERPL QL IA: NONREACTIVE
MCH RBC QN AUTO: 30.9 PG (ref 26–34)
MCHC RBC AUTO-ENTMCNC: 33 G/DL (ref 32–36)
MCV RBC AUTO: 94 FL (ref 80–100)
NRBC BLD-RTO: 0 /100 WBCS (ref 0–0)
PLATELET # BLD AUTO: 311 X10*3/UL (ref 150–450)
RBC # BLD AUTO: 4.01 X10*6/UL (ref 4–5.2)
REFLEX ADDED, ANEMIA PANEL: NORMAL
RH FACTOR (ANTIGEN D): NORMAL
RUBV IGG SERPL IA-ACNC: 2.4 IA
RUBV IGG SERPL QL IA: POSITIVE
WBC # BLD AUTO: 12 X10*3/UL (ref 4.4–11.3)

## 2024-08-12 PROCEDURE — 86901 BLOOD TYPING SEROLOGIC RH(D): CPT

## 2024-08-12 PROCEDURE — 86780 TREPONEMA PALLIDUM: CPT

## 2024-08-12 PROCEDURE — 87340 HEPATITIS B SURFACE AG IA: CPT

## 2024-08-12 PROCEDURE — 87591 N.GONORRHOEAE DNA AMP PROB: CPT

## 2024-08-12 PROCEDURE — 87491 CHLMYD TRACH DNA AMP PROBE: CPT

## 2024-08-12 PROCEDURE — 0501F PRENATAL FLOW SHEET: CPT | Performed by: OBSTETRICS & GYNECOLOGY

## 2024-08-12 PROCEDURE — 87389 HIV-1 AG W/HIV-1&-2 AB AG IA: CPT

## 2024-08-12 PROCEDURE — 85027 COMPLETE CBC AUTOMATED: CPT

## 2024-08-12 PROCEDURE — 36415 COLL VENOUS BLD VENIPUNCTURE: CPT

## 2024-08-12 PROCEDURE — 86900 BLOOD TYPING SEROLOGIC ABO: CPT

## 2024-08-12 PROCEDURE — 86803 HEPATITIS C AB TEST: CPT

## 2024-08-12 PROCEDURE — 86317 IMMUNOASSAY INFECTIOUS AGENT: CPT

## 2024-08-12 PROCEDURE — 83036 HEMOGLOBIN GLYCOSYLATED A1C: CPT

## 2024-08-12 PROCEDURE — 86850 RBC ANTIBODY SCREEN: CPT

## 2024-08-12 RX ORDER — ONDANSETRON 4 MG/1
4 TABLET, FILM COATED ORAL EVERY 8 HOURS PRN
Qty: 30 TABLET | Refills: 2 | Status: SHIPPED | OUTPATIENT
Start: 2024-08-12 | End: 2024-09-11

## 2024-08-12 NOTE — PROGRESS NOTES
Patient presents for OBFU & physical  OB labs with A1C, NIPT & GC/CT   Pap: 11/28/2022 nml HPV-  C/O: Nausea.     Radha Toussaint MA II    Routine prenatal visit   Doing well overall.  Had recent urine cx which was negative.  Started going to pelvic floor PT which has helped her pelvic pain.  She was started on zoloft by her PCP due to worsening anxiety and says she is doing much better now.  Discussed starting ASA - declines prescription - already has at home but she will start taking 162mg daily.  Has 13 week USN 8/14. Routine labs and NIPs today.  Still nauseous and requesting zofran refill- sent.     Medical Problems       Problem List       Myofascial pain syndrome    Pelvic floor dysfunction    Overactive bladder    Glen's disease    Chronic pelvic pain in female    Depression    Interstitial cystitis    13 weeks gestation of pregnancy ([facility])    Overview Addendum 8/12/2024  2:32 PM by Caryn Cooper MD     Desired provider in labor: [] CNM  [x] Physician  [x] Blood Products: [x] Yes, accepts [] No, needs counseling  [x] Initial BMI: 22.81   [] Prenatal Labs:   [x] Cervical Cancer Screening up to date- 11/2022 - nml, negative HPV   [] Rh status:   [] Genetic Screening:    [] NT US: (11-13 wks)  [x] Baby ASA (if indicated): started 8/12/24  [x] Pregnancy dated by:  LMP c/w USN at 7 weeks     [] Anatomy US: (19-20 wks)  [] Federal Sterilization consent signed (if indicated):  [] 1hr GCT at 24-28wks:  [] Rhogam (if indicated):   [] Fetal Surveillance (if indicated):  [] Tdap (27-32 wks, may be given up to 36 wks if initial window missed):   [] RSV (32-36 wks) (Sept. to end of Jan):   [] Flu Vaccine:    [] Breastfeeding:  [] Postpartum Birth control method:   [] GBS at 36 - 37 wks:  [] 39 weeks discussion of IOL vs. Expectant management:  [] Mode of delivery ( anticipated ):           Encounter for supervision of high risk pregnancy due to advanced maternal age in primigravida ([facility])    Overview  Addendum 8/12/2024  2:32 PM by Caryn Cooper MD     <> NIPS  today  <>  ASA started today          Depression affecting pregnancy in first trimester, antepartum ([facility])    Overview Addendum 8/12/2024  2:32 PM by Caryn Cooper MD     - on wellbutrin 150mg daily and zoloft 25mg daily   - managed by PCP              Follow up in 4 week(s).

## 2024-08-13 LAB
C TRACH RRNA SPEC QL NAA+PROBE: NEGATIVE
N GONORRHOEA DNA SPEC QL PROBE+SIG AMP: NEGATIVE
TREPONEMA PALLIDUM IGG+IGM AB [PRESENCE] IN SERUM OR PLASMA BY IMMUNOASSAY: NONREACTIVE

## 2024-08-14 ENCOUNTER — HOSPITAL ENCOUNTER (OUTPATIENT)
Dept: RADIOLOGY | Facility: CLINIC | Age: 39
Discharge: HOME | End: 2024-08-14
Payer: COMMERCIAL

## 2024-08-14 DIAGNOSIS — O09.521 AMA (ADVANCED MATERNAL AGE) MULTIGRAVIDA 35+, FIRST TRIMESTER (HHS-HCC): ICD-10-CM

## 2024-08-14 DIAGNOSIS — Z01.419 WELL WOMAN EXAM WITH ROUTINE GYNECOLOGICAL EXAM: ICD-10-CM

## 2024-08-14 PROBLEM — Z67.91 RH D NEGATIVE BLOOD TYPE: Status: ACTIVE | Noted: 2024-08-14

## 2024-08-14 PROCEDURE — 76801 OB US < 14 WKS SINGLE FETUS: CPT | Performed by: OBSTETRICS & GYNECOLOGY

## 2024-08-14 PROCEDURE — 76813 OB US NUCHAL MEAS 1 GEST: CPT | Performed by: OBSTETRICS & GYNECOLOGY

## 2024-08-14 PROCEDURE — 76813 OB US NUCHAL MEAS 1 GEST: CPT

## 2024-08-14 PROCEDURE — 76801 OB US < 14 WKS SINGLE FETUS: CPT

## 2024-08-21 LAB — SCAN RESULT: NORMAL

## 2024-08-23 ENCOUNTER — TELEPHONE (OUTPATIENT)
Dept: OBSTETRICS AND GYNECOLOGY | Facility: CLINIC | Age: 39
End: 2024-08-23
Payer: COMMERCIAL

## 2024-08-23 DIAGNOSIS — O21.9 NAUSEA AND VOMITING IN PREGNANCY PRIOR TO 22 WEEKS GESTATION (HHS-HCC): Primary | ICD-10-CM

## 2024-08-23 RX ORDER — ONDANSETRON HYDROCHLORIDE 8 MG/1
8 TABLET, FILM COATED ORAL EVERY 8 HOURS PRN
Qty: 20 TABLET | Refills: 2 | Status: SHIPPED | OUTPATIENT
Start: 2024-08-23 | End: 2024-09-22

## 2024-08-23 NOTE — TELEPHONE ENCOUNTER
Patient called at 14-6 weeks asking if we could up her dosage of Zofran from 4mg to 8mg. Her nausea is not improving, please advise.

## 2024-08-28 ENCOUNTER — TELEPHONE (OUTPATIENT)
Dept: OBSTETRICS AND GYNECOLOGY | Facility: CLINIC | Age: 39
End: 2024-08-28
Payer: COMMERCIAL

## 2024-08-28 NOTE — TELEPHONE ENCOUNTER
Patient states she has not vomited since starting the 8mg of Zofran, however she is still struggling with nausea. She wants to know if there is anything else she can incorporate with the Zofran to help relieve the nausea she is experiencing.

## 2024-09-01 ENCOUNTER — HOSPITAL ENCOUNTER (EMERGENCY)
Facility: HOSPITAL | Age: 39
Discharge: HOME | End: 2024-09-01
Attending: EMERGENCY MEDICINE
Payer: COMMERCIAL

## 2024-09-01 ENCOUNTER — APPOINTMENT (OUTPATIENT)
Dept: RADIOLOGY | Facility: HOSPITAL | Age: 39
End: 2024-09-01
Payer: COMMERCIAL

## 2024-09-01 VITALS
BODY MASS INDEX: 25.46 KG/M2 | HEART RATE: 88 BPM | DIASTOLIC BLOOD PRESSURE: 78 MMHG | RESPIRATION RATE: 17 BRPM | TEMPERATURE: 97.5 F | WEIGHT: 168 LBS | HEIGHT: 68 IN | OXYGEN SATURATION: 99 % | SYSTOLIC BLOOD PRESSURE: 127 MMHG

## 2024-09-01 DIAGNOSIS — N93.9 VAGINAL BLEEDING: Primary | ICD-10-CM

## 2024-09-01 DIAGNOSIS — O20.0 THREATENED ABORTION (HHS-HCC): ICD-10-CM

## 2024-09-01 LAB
ABO GROUP (TYPE) IN BLOOD: NORMAL
ALBUMIN SERPL BCP-MCNC: 3.7 G/DL (ref 3.4–5)
ALP SERPL-CCNC: 43 U/L (ref 33–110)
ALT SERPL W P-5'-P-CCNC: 32 U/L (ref 7–45)
ANION GAP SERPL CALC-SCNC: 11 MMOL/L (ref 10–20)
ANTIBODY SCREEN: NORMAL
APPEARANCE UR: CLEAR
AST SERPL W P-5'-P-CCNC: 22 U/L (ref 9–39)
B-HCG SERPL-ACNC: ABNORMAL MIU/ML
BASOPHILS # BLD AUTO: 0.06 X10*3/UL (ref 0–0.1)
BASOPHILS NFR BLD AUTO: 0.5 %
BILIRUB SERPL-MCNC: 0.4 MG/DL (ref 0–1.2)
BILIRUB UR STRIP.AUTO-MCNC: NEGATIVE MG/DL
BUN SERPL-MCNC: 10 MG/DL (ref 6–23)
CALCIUM SERPL-MCNC: 8.6 MG/DL (ref 8.6–10.3)
CHLORIDE SERPL-SCNC: 105 MMOL/L (ref 98–107)
CO2 SERPL-SCNC: 23 MMOL/L (ref 21–32)
COLOR UR: ABNORMAL
CREAT SERPL-MCNC: 0.51 MG/DL (ref 0.5–1.05)
EGFRCR SERPLBLD CKD-EPI 2021: >90 ML/MIN/1.73M*2
EOSINOPHIL # BLD AUTO: 0.14 X10*3/UL (ref 0–0.7)
EOSINOPHIL NFR BLD AUTO: 1.3 %
ERYTHROCYTE [DISTWIDTH] IN BLOOD BY AUTOMATED COUNT: 12.7 % (ref 11.5–14.5)
GLUCOSE SERPL-MCNC: 78 MG/DL (ref 74–99)
GLUCOSE UR STRIP.AUTO-MCNC: ABNORMAL MG/DL
HCT VFR BLD AUTO: 40.2 % (ref 36–46)
HGB BLD-MCNC: 13 G/DL (ref 12–16)
HOLD SPECIMEN: NORMAL
IMM GRANULOCYTES # BLD AUTO: 0.07 X10*3/UL (ref 0–0.7)
IMM GRANULOCYTES NFR BLD AUTO: 0.6 % (ref 0–0.9)
KETONES UR STRIP.AUTO-MCNC: NEGATIVE MG/DL
LEUKOCYTE ESTERASE UR QL STRIP.AUTO: NEGATIVE
LYMPHOCYTES # BLD AUTO: 1.76 X10*3/UL (ref 1.2–4.8)
LYMPHOCYTES NFR BLD AUTO: 15.9 %
MCH RBC QN AUTO: 30.7 PG (ref 26–34)
MCHC RBC AUTO-ENTMCNC: 32.3 G/DL (ref 32–36)
MCV RBC AUTO: 95 FL (ref 80–100)
MONOCYTES # BLD AUTO: 0.66 X10*3/UL (ref 0.1–1)
MONOCYTES NFR BLD AUTO: 6 %
MUCOUS THREADS #/AREA URNS AUTO: NORMAL /LPF
NEUTROPHILS # BLD AUTO: 8.39 X10*3/UL (ref 1.2–7.7)
NEUTROPHILS NFR BLD AUTO: 75.7 %
NITRITE UR QL STRIP.AUTO: NEGATIVE
NRBC BLD-RTO: 0 /100 WBCS (ref 0–0)
PH UR STRIP.AUTO: 7 [PH]
PLATELET # BLD AUTO: 302 X10*3/UL (ref 150–450)
POTASSIUM SERPL-SCNC: 3.5 MMOL/L (ref 3.5–5.3)
PROT SERPL-MCNC: 6.5 G/DL (ref 6.4–8.2)
PROT UR STRIP.AUTO-MCNC: NEGATIVE MG/DL
RBC # BLD AUTO: 4.24 X10*6/UL (ref 4–5.2)
RBC # UR STRIP.AUTO: ABNORMAL /UL
RBC #/AREA URNS AUTO: NORMAL /HPF
RH FACTOR (ANTIGEN D): NORMAL
SODIUM SERPL-SCNC: 135 MMOL/L (ref 136–145)
SP GR UR STRIP.AUTO: 1.01
UROBILINOGEN UR STRIP.AUTO-MCNC: NORMAL MG/DL
WBC # BLD AUTO: 11.1 X10*3/UL (ref 4.4–11.3)
WBC #/AREA URNS AUTO: NORMAL /HPF

## 2024-09-01 PROCEDURE — 99284 EMERGENCY DEPT VISIT MOD MDM: CPT

## 2024-09-01 PROCEDURE — 2500000004 HC RX 250 GENERAL PHARMACY W/ HCPCS (ALT 636 FOR OP/ED)

## 2024-09-01 PROCEDURE — 96372 THER/PROPH/DIAG INJ SC/IM: CPT

## 2024-09-01 PROCEDURE — 81001 URINALYSIS AUTO W/SCOPE: CPT | Performed by: EMERGENCY MEDICINE

## 2024-09-01 PROCEDURE — 84075 ASSAY ALKALINE PHOSPHATASE: CPT | Performed by: EMERGENCY MEDICINE

## 2024-09-01 PROCEDURE — 76816 OB US FOLLOW-UP PER FETUS: CPT

## 2024-09-01 PROCEDURE — 85025 COMPLETE CBC W/AUTO DIFF WBC: CPT | Performed by: EMERGENCY MEDICINE

## 2024-09-01 PROCEDURE — 84702 CHORIONIC GONADOTROPIN TEST: CPT | Performed by: EMERGENCY MEDICINE

## 2024-09-01 PROCEDURE — 86901 BLOOD TYPING SEROLOGIC RH(D): CPT | Performed by: EMERGENCY MEDICINE

## 2024-09-01 PROCEDURE — 36415 COLL VENOUS BLD VENIPUNCTURE: CPT | Performed by: EMERGENCY MEDICINE

## 2024-09-01 ASSESSMENT — PAIN DESCRIPTION - LOCATION: LOCATION: ABDOMEN

## 2024-09-01 ASSESSMENT — COLUMBIA-SUICIDE SEVERITY RATING SCALE - C-SSRS
6. HAVE YOU EVER DONE ANYTHING, STARTED TO DO ANYTHING, OR PREPARED TO DO ANYTHING TO END YOUR LIFE?: NO
2. HAVE YOU ACTUALLY HAD ANY THOUGHTS OF KILLING YOURSELF?: NO
1. IN THE PAST MONTH, HAVE YOU WISHED YOU WERE DEAD OR WISHED YOU COULD GO TO SLEEP AND NOT WAKE UP?: NO

## 2024-09-01 ASSESSMENT — LIFESTYLE VARIABLES
HAVE YOU EVER FELT YOU SHOULD CUT DOWN ON YOUR DRINKING: NO
TOTAL SCORE: 0
HAVE PEOPLE ANNOYED YOU BY CRITICIZING YOUR DRINKING: NO
EVER HAD A DRINK FIRST THING IN THE MORNING TO STEADY YOUR NERVES TO GET RID OF A HANGOVER: NO
EVER FELT BAD OR GUILTY ABOUT YOUR DRINKING: NO

## 2024-09-01 ASSESSMENT — PAIN DESCRIPTION - ONSET: ONSET: ONGOING

## 2024-09-01 ASSESSMENT — PAIN DESCRIPTION - DESCRIPTORS: DESCRIPTORS: STABBING

## 2024-09-01 ASSESSMENT — PAIN - FUNCTIONAL ASSESSMENT: PAIN_FUNCTIONAL_ASSESSMENT: 0-10

## 2024-09-01 ASSESSMENT — PAIN SCALES - GENERAL: PAINLEVEL_OUTOF10: 1

## 2024-09-01 ASSESSMENT — PAIN DESCRIPTION - FREQUENCY: FREQUENCY: INTERMITTENT

## 2024-09-01 ASSESSMENT — PAIN DESCRIPTION - ORIENTATION: ORIENTATION: LOWER;LEFT;RIGHT

## 2024-09-01 ASSESSMENT — PAIN DESCRIPTION - PROGRESSION: CLINICAL_PROGRESSION: GRADUALLY IMPROVING

## 2024-09-01 ASSESSMENT — PAIN DESCRIPTION - PAIN TYPE: TYPE: ACUTE PAIN

## 2024-09-01 NOTE — ED PROVIDER NOTES
EMERGENCY DEPARTMENT ENCOUNTER      Pt Name: Vianney Miner  MRN: [id number]  Birthdate 1985  Date of evaluation: 9/1/2024  Provider: Barbara Nicole MD    CHIEF COMPLAINT       Chief Complaint   Patient presents with    Vaginal Bleeding - Pregnant     Since 0300 patient endorses bright red bleeding and lightheadedness, denies any pain/cramping. Patient is about 16 weeks pregnant.                HISTORY OF PRESENT ILLNESS    The patient is a 28-year-old female primigravid at 16 weeks, that comes in to the ER after 2 painless episodes of vaginal bleeding last night.  She consulted her OB/GYN who told her to come to the ER.  She describes the bleeding as bright red and she reports that she lost about a tablespoon of blood with each episode.  She says that the blood is not clotted.  She reports that she has experienced sharp pains in her inguinal area that began a couple days ago. She describes it as a 1 out of 10 on the pain scale, sharp and stabbing, that has not gotten worse or more frequent.  Her personal medical history is significant for endometriosis and she had surgery for it January 2024.  She denies any family history of obstetrical problems.     PAST MEDICAL HISTORY     Past Medical History:   Diagnosis Date    Abnormal Pap smear of cervix Not recently    Anxiety 2004    Depression     Endometriosis Surgery january 2024    HPV (human papilloma virus) infection No longer have it    Interstitial cystitis (chronic) without hematuria 11/12/2020    Cystitis, interstitial    Other specified disorders of nose and nasal sinuses 11/12/2020    Nasal hypertrophy    Personal history of diseases of the skin and subcutaneous tissue 11/12/2020    History of folliculitis    Substance abuse (Multi) Sober from alcohol since July 2016    Urinary tract infection 2005    Uterine anomaly      CURRENT MEDICATIONS       Previous Medications    AZELAIC ACID (FINACEA) 15 % GEL    Apply to affected areas once daily     BUPROPION XL (WELLBUTRIN XL) 150 MG 24 HR TABLET    Take 1 tablet (150 mg) by mouth once daily in the morning. Do not crush, chew, or split.    KETOCONAZOLE (NIZORAL) 2 % SHAMPOO    Apply to the affected areas once daily every other day. Let sit for 2 minutes, rinse thoroughly.    ONDANSETRON (ZOFRAN) 4 MG TABLET    Take 1 tablet (4 mg) by mouth every 8 hours if needed for nausea or vomiting.    ONDANSETRON (ZOFRAN) 8 MG TABLET    Take 1 tablet (8 mg) by mouth every 8 hours if needed for nausea.    SERTRALINE (ZOLOFT) 50 MG TABLET    Take 1 tablet (50 mg) by mouth once daily.    SULFACETAMIDE SODIUM-SULFUR 9.8-4.8 % CLEANSER    Apply to the affected areas once daily every other day. Let sit for 2 minutes, rinse thoroughly.    TRAMADOL (ULTRAM) 50 MG TABLET    Take 1 tablet (50 mg) by mouth 3 times a day as needed for moderate pain (4 - 6).     SURGICAL HISTORY       Past Surgical History:   Procedure Laterality Date    CYSTOSCOPY      ENDOMETRIAL ABLATION  2024     ALLERGIES     Amoxicillin-pot clavulanate and Terconazole  FAMILY HISTORY       Family History   Problem Relation Name Age of Onset    Autoimmune disease Father Zeus     Hearing loss Father Zeus     Cancer Maternal Grandmother Cynthia     Diabetes Maternal Grandmother Cynthia      SOCIAL HISTORY       Social History     Tobacco Use    Smoking status: Former     Current packs/day: 0.00     Average packs/day: 0.5 packs/day for 10.0 years (5.0 ttl pk-yrs)     Types: Cigarettes     Start date: 2011     Quit date: 2016     Years since quittin.1    Smokeless tobacco: Never   Vaping Use    Vaping status: Never Used   Substance Use Topics    Alcohol use: Never    Drug use: Not Currently     Frequency: 1.0 times per week     Types: Marijuana     Comment: Medical marijuana for pain     PHYSICAL EXAM  (up to 7 for level 4, 8 or more for level 5)     ED Triage Vitals [24 0854]   Temperature Heart Rate Respirations BP   36.4 °C (97.5 °F) 86 18  119/69      Pulse Ox Temp Source Heart Rate Source Patient Position   98 % Tympanic Monitor Sitting      BP Location FiO2 (%)     Right arm --       Physical Exam  Exam conducted with a chaperone present.   Constitutional:       Appearance: She is well-developed.   Abdominal:      Comments: Gravid uterus, nontender to palpation   Genitourinary:     General: Normal vulva.      Comments: Normal vaginal exam.  No trauma to the vagina, no masses no active bleeding.  Old blood in vaginal canal.  Cervix attempted to visualize.   Skin:     General: Skin is warm and dry.   Neurological:      Mental Status: She is alert.   Psychiatric:         Behavior: Behavior is cooperative.        DIAGNOSTIC RESULTS   LABS:  Labs Reviewed   CBC WITH AUTO DIFFERENTIAL - Abnormal       Result Value    WBC 11.1      nRBC 0.0      RBC 4.24      Hemoglobin 13.0      Hematocrit 40.2      MCV 95      MCH 30.7      MCHC 32.3      RDW 12.7      Platelets 302      Neutrophils % 75.7      Immature Granulocytes %, Automated 0.6      Lymphocytes % 15.9      Monocytes % 6.0      Eosinophils % 1.3      Basophils % 0.5      Neutrophils Absolute 8.39 (*)     Immature Granulocytes Absolute, Automated 0.07      Lymphocytes Absolute 1.76      Monocytes Absolute 0.66      Eosinophils Absolute 0.14      Basophils Absolute 0.06     COMPREHENSIVE METABOLIC PANEL - Abnormal    Glucose 78      Sodium 135 (*)     Potassium 3.5      Chloride 105      Bicarbonate 23      Anion Gap 11      Urea Nitrogen 10      Creatinine 0.51      eGFR >90      Calcium 8.6      Albumin 3.7      Alkaline Phosphatase 43      Total Protein 6.5      AST 22      Bilirubin, Total 0.4      ALT 32     URINALYSIS WITH REFLEX CULTURE AND MICROSCOPIC - Abnormal    Color, Urine Light-Yellow      Appearance, Urine Clear      Specific Gravity, Urine 1.013      pH, Urine 7.0      Protein, Urine NEGATIVE      Glucose, Urine 70 (1+) (*)     Blood, Urine 0.2 (2+) (*)     Ketones, Urine NEGATIVE       Bilirubin, Urine NEGATIVE      Urobilinogen, Urine Normal      Nitrite, Urine NEGATIVE      Leukocyte Esterase, Urine NEGATIVE     HUMAN CHORIONIC GONADOTROPIN, SERUM QUANTITATIVE - Abnormal    HCG, Beta-Quantitative 31,077 (*)     Narrative:      Total HCG measurement is performed using the [employer] [place] Access   Immunoassay which detects intact HCG and free beta HCG subunit.    This test is not indicated for use as a tumor marker.   HCG testing is performed using a different test methodology at [facility] than other [facility]. Direct result comparison   should only be made within the same method.       TYPE AND SCREEN    ABO TYPE O      Rh TYPE NEG      ANTIBODY SCREEN NEG      Narrative:     Review your Rh Negative female patient's potential need for Rh Immune Globulin (RhIg)administration.   URINALYSIS WITH REFLEX CULTURE AND MICROSCOPIC    Narrative:     The following orders were created for panel order Urinalysis with Reflex Culture and Microscopic.  Procedure                               Abnormality         Status                     ---------                               -----------         ------                     Urinalysis with Reflex C...[[id number]]  Abnormal            Final result               Extra Urine Gray Tube[[id number]]                            In process                   Please view results for these tests on the individual orders.   EXTRA URINE GRAY TUBE   URINALYSIS MICROSCOPIC WITH REFLEX CULTURE    WBC, Urine 1-5      RBC, Urine NONE      Mucus, Urine FEW       All other labs were within normal range or not returned as of this dictation.  Imaging  US OB follow UP transabdominal approach   Final Result   Cervix is 3 cm in length and appears closed.        No placental tissue is identified in the region of the internal   cervical os and there is no definite placental hematoma. The   partially filled urinary bladder does compress the lower uterus above   the  internal cervical os.        Projected age is 15 weeks 1 day based on prior study. Current femur   length is above average for the projected age.             MACRO:   None        Signed by: Nadya Feliciano 9/1/2024 12:30 PM   Dictation workstation:   [id number]         Procedure  Procedures  EMERGENCY DEPARTMENT COURSE/MDM:   Medical Decision Making  The patient is a 28-year-old female primigravid at 16 weeks, that comes in to the ER after 2 painless episodes of vaginal bleeding last night.  She consulted Dr. Pittman who told her to come to the ER for evaluation.  She describes the bleeding as bright red and she reports that she lost about a tablespoon of blood with each episode.  She says that the blood is not clotted.  She reports that she has experienced sharp pains in her inguinal area that began a couple days ago. She describes it as a 1 out of 10 on the pain scale, sharp and stabbing, that has not gotten worse or more frequent.  Her personal medical history is significant for endometriosis and she had surgery for it January 2024.  She denies any family history of obstetrical problems.    She will get a CBC, CMP, type and screen, and urinalysis done.  She will also be swabbed for vaginosis, trichomoniasis, chlamydia and gonorrhea. A bedside ultrasound was done, the fetus was visualized and the fetal heart was visualized.  A pelvic exam was done with a chaperone present.  Pelvic exam reveals no masses or active bleeding.  A cervical exam was done with sterile procedure and a chaperone present.  Cervical exam revealed no masses, or active bleeding, cervix was not visualized.  Vaginal swabs done during cervical exam.  A formal ultrasound to be done to visualize fetal heart tones.    Dr. Pittman was consulted and she recommended giving the patient RhoGAM, pelvic rest, and follow-up with her OB/GYN.  The patient reports that she has an appointment this Wednesday that she will keep.  Patient will be discharged with  "return precautions and instructions for pelvic rest and follow-up.    Amount and/or Complexity of Data Reviewed  External Data Reviewed: notes.  Labs: ordered.  Radiology: ordered.      Vitals:    Vitals:    09/01/24 0854 09/01/24 1025 09/01/24 1100 09/01/24 1200   BP: 119/69 124/76 117/65 114/68   BP Location: Right arm Right arm  Right arm   Patient Position: Sitting Lying  Lying   Pulse: 86 74 80 73   Resp: 18 18  17   Temp: 36.4 °C (97.5 °F)      TempSrc: Tympanic      SpO2: 98% 97% 99% 98%   Weight: 76.2 kg (168 lb)      Height: 1.727 m (5' 8\")          Diagnoses as of 09/01/24 1332   Vaginal bleeding       Consultant discussions: Dr. Pittman was consulted and advised us to give RhoGAM and for the patient to have 3 days of pelvic rest.   External Records Reviewed: I reviewed recent and relevant outside records including inpatient notes, outpatient records      Shared decision making for disposition  Patient and/or patient´s representative was counseled regarding labs, imaging, likely diagnosis. All questions were answered. Recommendation was made for discharge home with return precautions and a follow up appointment with the OBGYN in 3 days.      ED Medications administered this visit:  Medications - No data to display    New Prescriptions from this visit:    New Prescriptions    No medications on file       Follow-up:  No follow-up provider specified.      Final Impression:   1. Vaginal bleeding          Please excuse any misspellings or unintended errors related to the Dragon speech recognition software used to dictate this note.    I reviewed the case with the attending ED physician. The attending ED physician agrees with the plan.      Barbara Nicole MD  Resident  09/01/24 1406    "

## 2024-09-01 NOTE — DISCHARGE INSTRUCTIONS
Return if active bleeding, heavy bleeding (more than a tablespoon of blood), severe pain, nausea, fever, or dizziness.  Keep the appointment with Dr. Cooper on Wednesday.  Nothing in the vagina for at least 3 days.

## 2024-09-01 NOTE — PROGRESS NOTES
Pt called to say she has had 2 episodes of bleeding overnight.  No other problems.  Denies cramping or pelvic pain.  Pt told to come into the hospital for eval.  Pt agreed to come in.

## 2024-09-03 ENCOUNTER — TELEPHONE (OUTPATIENT)
Dept: OBSTETRICS AND GYNECOLOGY | Facility: CLINIC | Age: 39
End: 2024-09-03
Payer: COMMERCIAL

## 2024-09-03 NOTE — TELEPHONE ENCOUNTER
Patient called OB line at 16-3 stating she was in the ER Sunday 9/1 for vaginal bleeding. States there was no evidence of active bleeding at the ER and was sent home. Patient still has some brown discharge today. Denies cramping or active bleeding. Has obfu with Allison tomorrow. Advised to continue to monitor and to call with active bleeding, cramping or LOF. Will call emergency number after 4:00pm today if needed. Patient vocalized understanding and will follow up with Allison tomorrow 9/4/24.

## 2024-09-03 NOTE — PROGRESS NOTES
Patient presents for OBFU  Flu: Accepts   C/O: Spotting - brown discharge     Radha Toussaint MA II    Routine prenatal visit   Seen earlier this week due to vaginal bleeding.  Has had only brownish spotting since.  Got Rhogam in ED.  Had normal USN at that time. Speculum exam done today - small amount of brown discharge seen - no active bleeding.  Pt has anatomy USN scheduled.     Medical Problems       Problem List       Myofascial pain syndrome    Pelvic floor dysfunction    Overactive bladder    Glen's disease    Chronic pelvic pain in female    Depression    Interstitial cystitis    13 weeks gestation of pregnancy ([facility])    Overview Addendum 9/4/2024  1:42 PM by Caryn Cooper MD     Desired provider in labor: [] CNM  [x] Physician  [x] Blood Products: [x] Yes, accepts [] No, needs counseling  [x] Initial BMI: 22.81   [x] Prenatal Labs:   [x] Cervical Cancer Screening up to date- 11/2022 - nml, negative HPV   [x] Rh status: Negative   [x] Genetic Screening:  RR NIPS - It's a GIRL   [x] NT US: (11-13 wks): WNL   [x] Baby ASA (if indicated): started 8/12/24  [x] Pregnancy dated by:  LMP c/w USN at 7 weeks     [] Anatomy US: (19-20 wks)  [] Federal Sterilization consent signed (if indicated):  [] 1hr GCT at 24-28wks:  [] Rhogam (if indicated):   [] Fetal Surveillance (if indicated):  [] Tdap (27-32 wks, may be given up to 36 wks if initial window missed):   [] RSV (32-36 wks) (Sept. to end of Jan):   [x] Flu Vaccine: Given 9/4      [] Breastfeeding:  [] Postpartum Birth control method:   [] GBS at 36 - 37 wks:  [] 39 weeks discussion of IOL vs. Expectant management:  [] Mode of delivery ( anticipated ):           Encounter for supervision of high risk pregnancy due to advanced maternal age in primigravida ([facility])    Overview Addendum 9/4/2024  1:43 PM by Caryn Cooper MD     - RR NIPS  - Taking prophylactic ASA          Depression affecting pregnancy in first trimester, antepartum ([facility])     Overview Addendum 8/12/2024  2:32 PM by Caryn Cooper MD     - on wellbutrin 150mg daily and zoloft 25mg daily   - managed by PCP          Blood type, Rh negative        Follow up in 4 week(s).

## 2024-09-04 ENCOUNTER — APPOINTMENT (OUTPATIENT)
Dept: OBSTETRICS AND GYNECOLOGY | Facility: CLINIC | Age: 39
End: 2024-09-04
Payer: COMMERCIAL

## 2024-09-04 VITALS — SYSTOLIC BLOOD PRESSURE: 124 MMHG | BODY MASS INDEX: 26.91 KG/M2 | WEIGHT: 177 LBS | DIASTOLIC BLOOD PRESSURE: 84 MMHG

## 2024-09-04 DIAGNOSIS — Z23 ENCOUNTER FOR VACCINATION: ICD-10-CM

## 2024-09-04 DIAGNOSIS — F32.A DEPRESSION AFFECTING PREGNANCY IN FIRST TRIMESTER, ANTEPARTUM (HHS-HCC): ICD-10-CM

## 2024-09-04 DIAGNOSIS — O23.42 URINARY TRACT INFECTION IN MOTHER DURING SECOND TRIMESTER OF PREGNANCY (HHS-HCC): ICD-10-CM

## 2024-09-04 DIAGNOSIS — O99.341 DEPRESSION AFFECTING PREGNANCY IN FIRST TRIMESTER, ANTEPARTUM (HHS-HCC): ICD-10-CM

## 2024-09-04 DIAGNOSIS — O09.519 ENCOUNTER FOR SUPERVISION OF HIGH RISK PREGNANCY DUE TO ADVANCED MATERNAL AGE IN PRIMIGRAVIDA (HHS-HCC): ICD-10-CM

## 2024-09-04 DIAGNOSIS — Z3A.13 13 WEEKS GESTATION OF PREGNANCY (HHS-HCC): Primary | ICD-10-CM

## 2024-09-04 LAB
POC APPEARANCE, URINE: CLEAR
POC BILIRUBIN, URINE: NEGATIVE
POC BLOOD, URINE: NEGATIVE
POC COLOR, URINE: YELLOW
POC GLUCOSE, URINE: NEGATIVE MG/DL
POC KETONES, URINE: NEGATIVE MG/DL
POC LEUKOCYTES, URINE: NEGATIVE
POC NITRITE,URINE: NEGATIVE
POC PH, URINE: 6 PH
POC PROTEIN, URINE: NEGATIVE MG/DL
POC SPECIFIC GRAVITY, URINE: 1.01
POC UROBILINOGEN, URINE: 0.2 EU/DL

## 2024-09-04 PROCEDURE — 0501F PRENATAL FLOW SHEET: CPT | Performed by: OBSTETRICS & GYNECOLOGY

## 2024-09-04 PROCEDURE — 90471 IMMUNIZATION ADMIN: CPT | Performed by: OBSTETRICS & GYNECOLOGY

## 2024-09-04 PROCEDURE — 90656 IIV3 VACC NO PRSV 0.5 ML IM: CPT | Performed by: OBSTETRICS & GYNECOLOGY

## 2024-09-04 PROCEDURE — 81003 URINALYSIS AUTO W/O SCOPE: CPT | Performed by: OBSTETRICS & GYNECOLOGY

## 2024-09-04 RX ORDER — ASPIRIN 81 MG/1
162 TABLET ORAL DAILY
Start: 2024-09-04 | End: 2025-09-04

## 2024-09-11 ENCOUNTER — HOSPITAL ENCOUNTER (OUTPATIENT)
Dept: RADIOLOGY | Facility: CLINIC | Age: 39
Discharge: HOME | End: 2024-09-11
Payer: COMMERCIAL

## 2024-09-11 ENCOUNTER — TELEPHONE (OUTPATIENT)
Dept: OBSTETRICS AND GYNECOLOGY | Facility: CLINIC | Age: 39
End: 2024-09-11
Payer: COMMERCIAL

## 2024-09-11 DIAGNOSIS — Z01.419 WELL WOMAN EXAM WITH ROUTINE GYNECOLOGICAL EXAM: ICD-10-CM

## 2024-09-11 PROCEDURE — 76815 OB US LIMITED FETUS(S): CPT | Performed by: OBSTETRICS & GYNECOLOGY

## 2024-09-11 PROCEDURE — 76815 OB US LIMITED FETUS(S): CPT

## 2024-09-11 NOTE — TELEPHONE ENCOUNTER
Patient called in wanting to speak to someone regarding some light cramping and spotting they were having. Patient stated it was a gush of blood but now is just spotting.

## 2024-09-11 NOTE — TELEPHONE ENCOUNTER
Patient called back today after her ultrasound stating the doctor at [facility] told her she has a blood clot outside her uterus. Patient ultrasound report is not back yet so I was unable to see what they found. Patient wanted to know what she should be doing. Advised pelvic rest and limit physical activity as well as hydrate. Instructed to call the office or the emergency line with heavy or increased vaginal bleeding. Also advised I would touch base with Dr Cooper to see what she recommends and that we would get back to her ASAP. Patient vocalized understanding, no further questions at this time.

## 2024-09-11 NOTE — TELEPHONE ENCOUNTER
Spoke with patient, she is 17-4 today with dark brown spotting and light cramping. Denies passing any clots and the spotting is only when she wipes. Consulted with Dr Cooper in office, advised scan at [facility] today. Patient aware and is scheduled for 1:00pm today at [facility]. Instructed to call back if bleeding progresses before her appointment today. Patient vocalized understanding. No further questions at this time.

## 2024-09-12 ENCOUNTER — TELEPHONE (OUTPATIENT)
Dept: OBSTETRICS AND GYNECOLOGY | Facility: CLINIC | Age: 39
End: 2024-09-12
Payer: COMMERCIAL

## 2024-09-12 NOTE — TELEPHONE ENCOUNTER
17.5 wk ob called to let Dr Cooper know that she had her scan done and was told that she has a subchorionic hematoma, which caused her bleeding to occur.  Patient states the Dr that came in to talk to her told her that having a subchorionic hematoma can cause complications with  labor.      Patient states she is now only spotting, no cramping or pain at this time.    Patient was assured that subchorionic hematomas tend to resolve without issue.  She may have spotting on and off until it completely resolves and to not be surprised if this happens.      Patient advised to monitor for now and to call back if she starts bleeding heavy saturating her pad every 1-2 hours, passing large clots and/or pain/cramping.  Per chart, next scan scheduled for 10/2 for her anatomy and follow up on subchorionic hematoma.    Patient encouraged to stay optimistic for now and only focus on things that we can change.  Patient agreed and will await her next scan and obfu.    She was assured that I will pass this message onto Dr Cooper and give her a call back with any further recommendations.    No further questions.  
patient

## 2024-09-18 ASSESSMENT — ENCOUNTER SYMPTOMS
SORE THROAT: 0
CHILLS: 0
DYSURIA: 1
FREQUENCY: 0
FEVER: 0
HEMATURIA: 0
BACK PAIN: 0
ANOREXIA: 0
DIARRHEA: 0
CONSTIPATION: 0
ABDOMINAL PAIN: 0
VOMITING: 0
FLANK PAIN: 0
HEADACHES: 0
NAUSEA: 0

## 2024-09-20 ENCOUNTER — ROUTINE PRENATAL (OUTPATIENT)
Dept: OBSTETRICS AND GYNECOLOGY | Facility: CLINIC | Age: 39
End: 2024-09-20
Payer: COMMERCIAL

## 2024-09-20 VITALS — BODY MASS INDEX: 27.37 KG/M2 | WEIGHT: 180 LBS | SYSTOLIC BLOOD PRESSURE: 111 MMHG | DIASTOLIC BLOOD PRESSURE: 75 MMHG

## 2024-09-20 DIAGNOSIS — Z3A.18 18 WEEKS GESTATION OF PREGNANCY (HHS-HCC): ICD-10-CM

## 2024-09-20 DIAGNOSIS — O09.519 ENCOUNTER FOR SUPERVISION OF HIGH RISK PREGNANCY DUE TO ADVANCED MATERNAL AGE IN PRIMIGRAVIDA: ICD-10-CM

## 2024-09-20 DIAGNOSIS — O46.90 VAGINAL BLEEDING DURING PREGNANCY (HHS-HCC): ICD-10-CM

## 2024-09-20 DIAGNOSIS — N89.8 VAGINAL ITCHING: Primary | ICD-10-CM

## 2024-09-20 PROCEDURE — 0501F PRENATAL FLOW SHEET: CPT | Performed by: ADVANCED PRACTICE MIDWIFE

## 2024-09-20 PROCEDURE — 87205 SMEAR GRAM STAIN: CPT

## 2024-09-20 ASSESSMENT — ENCOUNTER SYMPTOMS
HEMATOLOGIC/LYMPHATIC NEGATIVE: 0
PSYCHIATRIC NEGATIVE: 0
CARDIOVASCULAR NEGATIVE: 0
ALLERGIC/IMMUNOLOGIC NEGATIVE: 0
RESPIRATORY NEGATIVE: 0
ENDOCRINE NEGATIVE: 0
EYES NEGATIVE: 0
GASTROINTESTINAL NEGATIVE: 0
MUSCULOSKELETAL NEGATIVE: 0
NEUROLOGICAL NEGATIVE: 0
CONSTITUTIONAL NEGATIVE: 0

## 2024-09-20 ASSESSMENT — EDINBURGH POSTNATAL DEPRESSION SCALE (EPDS)
THE THOUGHT OF HARMING MYSELF HAS OCCURRED TO ME: NEVER
I HAVE BEEN ANXIOUS OR WORRIED FOR NO GOOD REASON: HARDLY EVER
I HAVE BEEN SO UNHAPPY THAT I HAVE BEEN CRYING: NO, NEVER
I HAVE BEEN SO UNHAPPY THAT I HAVE HAD DIFFICULTY SLEEPING: NOT AT ALL
I HAVE FELT SAD OR MISERABLE: NO, NOT AT ALL
I HAVE BEEN ABLE TO LAUGH AND SEE THE FUNNY SIDE OF THINGS: AS MUCH AS I ALWAYS COULD
I HAVE FELT SCARED OR PANICKY FOR NO GOOD REASON: NO, NOT AT ALL
I HAVE LOOKED FORWARD WITH ENJOYMENT TO THINGS: AS MUCH AS I EVER DID
I HAVE BLAMED MYSELF UNNECESSARILY WHEN THINGS WENT WRONG: NOT VERY OFTEN
THINGS HAVE BEEN GETTING ON TOP OF ME: NO, I HAVE BEEN COPING AS WELL AS EVER
TOTAL SCORE: 2

## 2024-09-20 NOTE — PROGRESS NOTES
Subjective   Vianney Miner is a 38 y.o.  at 18w6d here for an ADD-ON prenatal visit: she is complaining of vaginal irritation and itching since the beginning of week, and also improving bleeding from subchorionic hematoma. Has tried clobetasol for vaginal itching with no relief. Patient has a history of recurrent yeast infection.   She denies abdominal pain and leakage of fluid.     Her pregnancy is complicated by:  Pregnancy Problems (from 24 to present)       Problem Noted Resolved    13 weeks gestation of pregnancy ([facility]) 2024 by Caryn Cooper MD No    Overview Addendum 2024  1:42 PM by Caryn Cooper MD     Desired provider in labor: [] CNM  [x] Physician  [x] Blood Products: [x] Yes, accepts [] No, needs counseling  [x] Initial BMI: 22.81   [x] Prenatal Labs:   [x] Cervical Cancer Screening up to date- 2022 - nml, negative HPV   [x] Rh status: Negative   [x] Genetic Screening:  RR NIPS - It's a GIRL   [x] NT US: (11-13 wks): WNL   [x] Baby ASA (if indicated): started 24  [x] Pregnancy dated by:  LMP c/w USN at 7 weeks     [] Anatomy US: (19-20 wks)  [] Federal Sterilization consent signed (if indicated):  [] 1hr GCT at 24-28wks:  [] Rhogam (if indicated):   [] Fetal Surveillance (if indicated):  [] Tdap (27-32 wks, may be given up to 36 wks if initial window missed):   [] RSV (32-36 wks) (Sept. to end of ):   [x] Flu Vaccine: Given       [] Breastfeeding:  [] Postpartum Birth control method:   [] GBS at 36 - 37 wks:  [] 39 weeks discussion of IOL vs. Expectant management:  [] Mode of delivery ( anticipated ):           Encounter for supervision of high risk pregnancy due to advanced maternal age in primigravida ([facility]) 2024 by Caryn Cooper MD No    Overview Addendum 2024  1:43 PM by Caryn Cooper MD     - RR NIPS  - Taking prophylactic ASA          Depression affecting pregnancy in first trimester, antepartum ([facility])  2024 by Caryn Cooper MD No    Overview Addendum 2024  2:32 PM by Caryn Cooper MD     - on wellbutrin 150mg daily and zoloft 25mg daily   - managed by PCP           Objective  see flow sheet  Sterile spec exam: no bleeding appreciated, increased thin discharge    Assessment/Plan   Problem List Items Addressed This Visit             ICD-10-CM       Pregnancy    13 weeks gestation of pregnancy ([facility]) Z3A.13    Encounter for supervision of high risk pregnancy due to advanced maternal age in primigravida ([facility]) O09.519     Other Visit Diagnoses         Codes    Vaginal itching    -  Primary N89.8    Relevant Orders    Vaginitis Gram Stain For Bacterial Vaginosis + Yeast    Vaginal bleeding during pregnancy ([facility])     O46.90          38 y.o.  at 18w6d  Reassurance about bleeding provided and exam negative for blood reviewed  BV/yeast swab collected  Perineal and vulvar hygiene encouraged: patient to avoid irritants and perfused soaps/detergents. Excellent hydration encouraged. Patient encouraged to consider inclusion of probiotics and/or unsweetended yogurt in her diet, and decrease dietary sugars. Breathable clothing, cotton underwear and sleeping without underwear/in breathable shorts also discussed.   Will defer additional treatment until results are back.   Warning s/s discussed, and SAB precautions  reviewed. Patient has the on-call CN number ([phone]) for after hour emergencies.     Follow up in 4 weeks and/or as needed for a routine prenatal visit.  TRUDY Aguirre-MITZY

## 2024-09-23 LAB
CLUE CELLS VAG LPF-#/AREA: NORMAL /[LPF]
NUGENT SCORE: 0
YEAST VAG WET PREP-#/AREA: NORMAL

## 2024-09-24 ENCOUNTER — APPOINTMENT (OUTPATIENT)
Dept: RADIOLOGY | Facility: CLINIC | Age: 39
End: 2024-09-24
Payer: COMMERCIAL

## 2024-09-27 ENCOUNTER — TELEPHONE (OUTPATIENT)
Dept: OBSTETRICS AND GYNECOLOGY | Facility: CLINIC | Age: 39
End: 2024-09-27

## 2024-09-27 DIAGNOSIS — O21.9 NAUSEA AND VOMITING IN PREGNANCY PRIOR TO 22 WEEKS GESTATION: ICD-10-CM

## 2024-09-27 RX ORDER — ONDANSETRON HYDROCHLORIDE 8 MG/1
8 TABLET, FILM COATED ORAL EVERY 8 HOURS PRN
Qty: 20 TABLET | Refills: 2 | Status: SHIPPED | OUTPATIENT
Start: 2024-09-27 | End: 2024-10-27

## 2024-10-02 ENCOUNTER — HOSPITAL ENCOUNTER (OUTPATIENT)
Dept: RADIOLOGY | Facility: CLINIC | Age: 39
Discharge: HOME | End: 2024-10-02
Payer: COMMERCIAL

## 2024-10-02 ENCOUNTER — APPOINTMENT (OUTPATIENT)
Dept: OBSTETRICS AND GYNECOLOGY | Facility: CLINIC | Age: 39
End: 2024-10-02
Payer: COMMERCIAL

## 2024-10-02 VITALS — BODY MASS INDEX: 27.98 KG/M2 | DIASTOLIC BLOOD PRESSURE: 73 MMHG | SYSTOLIC BLOOD PRESSURE: 120 MMHG | WEIGHT: 184 LBS

## 2024-10-02 DIAGNOSIS — N90.89 VULVAR IRRITATION: ICD-10-CM

## 2024-10-02 DIAGNOSIS — O09.519 ENCOUNTER FOR SUPERVISION OF HIGH RISK PREGNANCY DUE TO ADVANCED MATERNAL AGE IN PRIMIGRAVIDA: ICD-10-CM

## 2024-10-02 DIAGNOSIS — F32.A DEPRESSION AFFECTING PREGNANCY IN FIRST TRIMESTER, ANTEPARTUM (HHS-HCC): ICD-10-CM

## 2024-10-02 DIAGNOSIS — Z01.419 WELL WOMAN EXAM WITH ROUTINE GYNECOLOGICAL EXAM: ICD-10-CM

## 2024-10-02 DIAGNOSIS — O99.341 DEPRESSION AFFECTING PREGNANCY IN FIRST TRIMESTER, ANTEPARTUM (HHS-HCC): ICD-10-CM

## 2024-10-02 DIAGNOSIS — O44.00: ICD-10-CM

## 2024-10-02 DIAGNOSIS — Z3A.20 20 WEEKS GESTATION OF PREGNANCY (HHS-HCC): Primary | ICD-10-CM

## 2024-10-02 PROCEDURE — 0501F PRENATAL FLOW SHEET: CPT | Performed by: OBSTETRICS & GYNECOLOGY

## 2024-10-02 PROCEDURE — 76817 TRANSVAGINAL US OBSTETRIC: CPT

## 2024-10-02 PROCEDURE — 76811 OB US DETAILED SNGL FETUS: CPT | Performed by: OBSTETRICS & GYNECOLOGY

## 2024-10-02 PROCEDURE — 76817 TRANSVAGINAL US OBSTETRIC: CPT | Performed by: OBSTETRICS & GYNECOLOGY

## 2024-10-02 PROCEDURE — 76811 OB US DETAILED SNGL FETUS: CPT

## 2024-10-02 RX ORDER — TRIAMCINOLONE ACETONIDE 1 MG/G
OINTMENT TOPICAL DAILY PRN
Qty: 30 G | Refills: 1 | Status: SHIPPED | OUTPATIENT
Start: 2024-10-02

## 2024-10-02 NOTE — PROGRESS NOTES
Patient presents for OBFU  Glucose and 28 week folder given for n/v. Patient understands that she is to NOT drink the glucose if she is rescheduled to a different location.  C/O: Placenta previa and subchronic hematoma. Vaginal irritation/itching. Weight gain concerns     Radha Toussaint MA II    Routine prenatal visit   Had anatomy USN today and complete previa seen in addition to the subchorionic hemorrhage which appeared stable.  Discussed diagnosis with pt today.  F/u USN at 28 weeks scheduled.  Feeling daily FM now which is helping her anxiety.  Has not had any vaginal bleeding in 1 week and the last time she had bleeding, it was a small amount of brown blood only. Given glucola supplies for next visit.     Medical Problems       Problem List       Myofascial pain syndrome    Pelvic floor dysfunction    Overactive bladder    Glen's disease    Chronic pelvic pain in female    Depression    Interstitial cystitis    20 weeks gestation of pregnancy ([facility])    Overview Addendum 10/2/2024  5:03 PM by Caryn Cooper MD     Desired provider in labor: [] CNM  [x] Physician  [x] Blood Products: [x] Yes, accepts [] No, needs counseling  [x] Initial BMI: 22.81   [x] Prenatal Labs:   [x] Cervical Cancer Screening up to date- 11/2022 - nml, negative HPV   [x] Rh status: Negative   [x] Genetic Screening:  RR NIPS - It's a GIRL   [x] NT US: (11-13 wks): WNL   [x] Baby ASA (if indicated): started 8/12/24  [x] Pregnancy dated by:  LMP c/w USN at 7 weeks     [x] Anatomy US: (19-20 wks): wnl   [] 1hr GCT at 24-28wks:  [] Rhogam (if indicated):   [] Fetal Surveillance (if indicated):  [] Tdap (27-32 wks, may be given up to 36 wks if initial window missed):   [] RSV (32-36 wks) (Sept. to end of Jan):   [x] Flu Vaccine: Given 9/4  [x] Updated COVID vaccine recommended     [] Breastfeeding:  [] Postpartum Birth control method:   [] GBS at 36 - 37 wks:  [] 39 weeks discussion of IOL vs. Expectant management:  [] Mode of  delivery ( anticipated ):           Encounter for supervision of high risk pregnancy due to advanced maternal age in primigravida    Overview Addendum 9/4/2024  1:43 PM by Caryn Cooper MD     - RR NIPS  - Taking prophylactic ASA          Depression affecting pregnancy in first trimester, antepartum ([facility])    Overview Addendum 8/12/2024  2:32 PM by Caryn Cooper MD     - on wellbutrin 150mg daily and zoloft 25mg daily   - managed by PCP          Blood type, Rh negative    Complete placenta previa nos or without hemorrhage, unspecified trimester ([facility])    Overview Signed 10/2/2024  5:04 PM by Caryn Cooper MD     <> f/u USN 28 weeks              Follow p in 4 week(s).

## 2024-10-08 ENCOUNTER — APPOINTMENT (OUTPATIENT)
Dept: RADIOLOGY | Facility: CLINIC | Age: 39
End: 2024-10-08
Payer: COMMERCIAL

## 2024-10-09 ENCOUNTER — APPOINTMENT (OUTPATIENT)
Dept: PRIMARY CARE | Facility: CLINIC | Age: 39
End: 2024-10-09
Payer: COMMERCIAL

## 2024-10-14 ENCOUNTER — APPOINTMENT (OUTPATIENT)
Dept: PRIMARY CARE | Facility: CLINIC | Age: 39
End: 2024-10-14
Payer: COMMERCIAL

## 2024-10-15 ENCOUNTER — APPOINTMENT (OUTPATIENT)
Dept: PRIMARY CARE | Facility: CLINIC | Age: 39
End: 2024-10-15
Payer: COMMERCIAL

## 2024-10-17 ENCOUNTER — HOSPITAL ENCOUNTER (OUTPATIENT)
Dept: RADIOLOGY | Facility: CLINIC | Age: 39
Discharge: HOME | End: 2024-10-17
Payer: COMMERCIAL

## 2024-10-17 DIAGNOSIS — Z36.2 ENCOUNTER FOR FOLLOW-UP ULTRASOUND OF FETAL ANATOMY: ICD-10-CM

## 2024-10-17 DIAGNOSIS — O09.519 AMA (ADVANCED MATERNAL AGE) PRIMIGRAVIDA 35+ (HHS-HCC): ICD-10-CM

## 2024-10-17 DIAGNOSIS — Z01.419 WELL WOMAN EXAM WITH ROUTINE GYNECOLOGICAL EXAM: ICD-10-CM

## 2024-10-17 PROCEDURE — 76816 OB US FOLLOW-UP PER FETUS: CPT

## 2024-10-18 ENCOUNTER — APPOINTMENT (OUTPATIENT)
Dept: PRIMARY CARE | Facility: CLINIC | Age: 39
End: 2024-10-18
Payer: COMMERCIAL

## 2024-10-18 ENCOUNTER — TELEPHONE (OUTPATIENT)
Dept: OBSTETRICS AND GYNECOLOGY | Facility: HOSPITAL | Age: 39
End: 2024-10-18

## 2024-10-18 VITALS
SYSTOLIC BLOOD PRESSURE: 116 MMHG | HEIGHT: 68 IN | TEMPERATURE: 97.9 F | RESPIRATION RATE: 16 BRPM | BODY MASS INDEX: 28.19 KG/M2 | WEIGHT: 186 LBS | OXYGEN SATURATION: 100 % | DIASTOLIC BLOOD PRESSURE: 72 MMHG | HEART RATE: 72 BPM

## 2024-10-18 DIAGNOSIS — O99.341 DEPRESSION AFFECTING PREGNANCY IN FIRST TRIMESTER, ANTEPARTUM (HHS-HCC): Primary | ICD-10-CM

## 2024-10-18 DIAGNOSIS — F32.A DEPRESSION AFFECTING PREGNANCY IN FIRST TRIMESTER, ANTEPARTUM (HHS-HCC): Primary | ICD-10-CM

## 2024-10-18 PROBLEM — O44.00: Status: RESOLVED | Noted: 2024-10-02 | Resolved: 2024-10-18

## 2024-10-18 PROCEDURE — 1036F TOBACCO NON-USER: CPT | Performed by: INTERNAL MEDICINE

## 2024-10-18 PROCEDURE — 99213 OFFICE O/P EST LOW 20 MIN: CPT | Performed by: INTERNAL MEDICINE

## 2024-10-18 PROCEDURE — 3008F BODY MASS INDEX DOCD: CPT | Performed by: INTERNAL MEDICINE

## 2024-10-18 RX ORDER — NITROFURANTOIN (MACROCRYSTALS) 100 MG/1
CAPSULE ORAL
COMMUNITY

## 2024-10-18 ASSESSMENT — ENCOUNTER SYMPTOMS
FATIGUE: 0
COUGH: 0
FEVER: 0
SHORTNESS OF BREATH: 0

## 2024-10-18 NOTE — PROGRESS NOTES
"Subjective   Vianney Miner is a 38 y.o. female who presents for 3 month Depression and Anxiety follow up.    HPI   Mood much improved.  Tolerating Wellbutrin/Sertraline well.  Denies adverse se's.    Review of Systems   Constitutional:  Negative for fatigue and fever.   Respiratory:  Negative for cough and shortness of breath.    Cardiovascular:  Negative for chest pain and leg swelling.   All other systems reviewed and are negative.      Health Maintenance Due   Topic Date Due    Hepatitis B Vaccines (1 of 3 - 19+ 3-dose series) Never done    Varicella Vaccines (1 of 2 - 13+ 2-dose series) Never done    COVID-19 Vaccine (5 - 2024-25 season) 09/01/2024       Objective   /72   Pulse 72   Temp 36.6 °C (97.9 °F)   Resp 16   Ht 1.727 m (5' 8\")   Wt 84.4 kg (186 lb)   LMP 05/11/2024   SpO2 100%   BMI 28.28 kg/m²     Physical Exam  Vitals and nursing note reviewed.   Constitutional:       Appearance: Normal appearance.   HENT:      Head: Normocephalic.   Eyes:      Conjunctiva/sclera: Conjunctivae normal.      Pupils: Pupils are equal, round, and reactive to light.   Cardiovascular:      Rate and Rhythm: Normal rate and regular rhythm.      Pulses: Normal pulses.      Heart sounds: Normal heart sounds.   Pulmonary:      Effort: Pulmonary effort is normal.      Breath sounds: Normal breath sounds.   Musculoskeletal:         General: No swelling.      Cervical back: Neck supple.   Skin:     General: Skin is warm and dry.   Neurological:      General: No focal deficit present.      Mental Status: She is oriented to person, place, and time.         Assessment/Plan   Problem List Items Addressed This Visit       Depression affecting pregnancy in first trimester, antepartum ([facility]) - Primary     Cont meds  Stable based on symptoms and exam.  Continue established treatment plan and follow up at least yearly.         "

## 2024-10-23 ENCOUNTER — TELEPHONE (OUTPATIENT)
Dept: OBSTETRICS AND GYNECOLOGY | Facility: CLINIC | Age: 39
End: 2024-10-23
Payer: COMMERCIAL
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
## 2024-10-25 ENCOUNTER — OFFICE VISIT (OUTPATIENT)
Dept: PEDIATRIC CARDIOLOGY | Facility: HOSPITAL | Age: 39
End: 2024-10-25
Payer: COMMERCIAL

## 2024-10-25 ENCOUNTER — HOSPITAL ENCOUNTER (OUTPATIENT)
Dept: PEDIATRIC CARDIOLOGY | Facility: HOSPITAL | Age: 39
Discharge: HOME | End: 2024-10-25
Payer: COMMERCIAL

## 2024-10-25 VITALS
BODY MASS INDEX: 28.57 KG/M2 | HEART RATE: 89 BPM | DIASTOLIC BLOOD PRESSURE: 69 MMHG | OXYGEN SATURATION: 98 % | SYSTOLIC BLOOD PRESSURE: 114 MMHG | HEIGHT: 68 IN | WEIGHT: 188.49 LBS

## 2024-10-25 DIAGNOSIS — O35.8XX0 MATERNAL CARE FOR OTHER (SUSPECTED) FETAL ABNORMALITY AND DAMAGE, NOT APPLICABLE OR UNSPECIFIED (HHS-HCC): ICD-10-CM

## 2024-10-25 DIAGNOSIS — O28.3 ABNORMAL FETAL ULTRASOUND: ICD-10-CM

## 2024-10-25 DIAGNOSIS — O35.BXX0 ABNORMAL FETAL ECHOCARDIOGRAPHY AFFECTING ANTEPARTUM CARE OF MOTHER, SINGLE OR UNSPECIFIED FETUS (HHS-HCC): Primary | ICD-10-CM

## 2024-10-25 PROCEDURE — 3008F BODY MASS INDEX DOCD: CPT | Performed by: PEDIATRICS

## 2024-10-25 PROCEDURE — 76827 ECHO EXAM OF FETAL HEART: CPT | Performed by: PEDIATRICS

## 2024-10-25 PROCEDURE — 93325 DOPPLER ECHO COLOR FLOW MAPG: CPT

## 2024-10-25 PROCEDURE — 76825 ECHO EXAM OF FETAL HEART: CPT | Performed by: PEDIATRICS

## 2024-10-25 PROCEDURE — 99204 OFFICE O/P NEW MOD 45 MIN: CPT | Performed by: PEDIATRICS

## 2024-10-25 PROCEDURE — 93325 DOPPLER ECHO COLOR FLOW MAPG: CPT | Performed by: PEDIATRICS

## 2024-10-25 PROCEDURE — 99214 OFFICE O/P EST MOD 30 MIN: CPT | Performed by: PEDIATRICS

## 2024-10-25 NOTE — PROGRESS NOTES
Vianney Miner was seen at the request of Jessica Ricci for a chief complaint of AMA and limited fetal heart views; a report with my findings is being sent via written or electronic means the referring physician with my recommendations for treatment.     I had the pleasure of seeing Vianney Miner in Pediatric Cardiology consultation at our [facility] location as part of our prenatal heart program for AMA and limited fetal heart views.  She is a 38 y.o. year-old  woman, currently 23w6d weeks gestation. Patient's last menstrual period was 2024. Estimated Date of Delivery: 2/15/25.  There have been no pregnancy complications.   She has not been hospitalized during this pregnancy.  She had a NIPT, which was normal.  She had a second trimester ultrasound which was normal, with limited fetal heart views.      Prior to the visit, I personally reviewed the cardiac portions of the obstetrical ultrasound performed on 10/2/24.  There is normal segmental anatomy with normal 4 chamber, outflow tract and 3 vessel views.  There is no evidence of septation defect, right or left ventricular outflow obstruction or significant valvular regurgitation.    Her previous obstetrical history is without complication.  Her past medical history is significant for depression and alcohol abuse.  She has no history of congenital heart disease, arrhythmia, cardiomyopathy, hypercholesterolemia, hypertension, diabetes, rheumatic heart disease, cancer, asthma, lupus, Sjogren syndrome, clotting disorder, anxiety, phenylketonuria, or DiGeorge.  She has had a laparoscopic endometrial ablation.  She takes Wellbutrin, Zofran, and Zoloft.  She has is allergic to amoxicillin-pot clavulanate and terconazole.  She is currently taking prenatal vitamins.      Her family history is negative for congenital heart disease, early atherosclerosis, sudden cardiac death, long QT syndrome, cardiomyopathy, aortic aneurysm, or genetic or metabolic  "disease.  FOB states his mother had an arrhythmia and his brother has A-fib and POTS.    She currently lives with her spouse and is .  She works as .  She is a former smoker and states that she quit in 2016.  She denies illicit drug use or alcohol abuse.  She denies verbal, sexual, or physical abuse.     Delivery Hospital: [facility]  Father of the baby's name: Kristofer    /69 (BP Location: Right arm, Patient Position: Sitting, BP Cuff Size: Adult)   Pulse 89   Ht 1.73 m (5' 8.11\")   Wt 85.5 kg (188 lb 7.9 oz)   LMP 2024   SpO2 98%   BMI 28.57 kg/m²     She was resting comfortably in the examination room and alert, active and in no respiratory distress. Skin was without rash.  HEENT: moist mucous membranes, no JVD, goiter. Breathing is not labored.  She was acyanotic.  There was no peripheral edema.   The abdomen was gravid, soft, nontender with normal bowel sounds.  The liver was not palpable.  The spleen tip was not palpable.  She had a normal gait and normal strength in all extremities.  Cranial nerves II - XII are intact.  She had no clubbing, cyanosis, or edema.    A two-dimensional and Doppler fetal echocardiogram was performed today and interpreted by me at 23w6d weeks gestation.  The fetal echocardiogram showed normal segmental anatomy with no structural abnormalities found.  There is normal cardiac function.  There is no evidence of septation defect, right or left ventricular outflow obstruction or significant valvular regurgitation.  The fetal heart rate was within normal limits without ectopy or arrhythmia seen.  The spectral Doppler pattern across all valves, venous structures, and arterial structures was within normal limits.  There is no pericardial effusion.  Please see full report for details.    In summary, Vianney Miner is a 38 y.o. year-old  woman, currently 23w6d weeks gestation, who had a normal fetal echocardiogram at today's visit.  Therefore, we did not " make any changes to her current delivery plan.  We did not prescribe any medications.  We did not recommend intervention.  She does not necessarily need to follow up with pediatric cardiology after the baby is born unless the pediatric team has any concerns or worries.     LOC: 0  Normal fetal echocardiogram within the limitations of ultrasound. No changes were made to current delivery plan. Triage code 0:  Delivery per OB at patient's preferred hospital.  Standard  care per  team.  Cardiology consult not necessary, unless there are clinical concerns.       Scribe's statement: I, Kim Mackay CNP, am scribing for, and in the presence of, Dr. Montanez.    Thank you for allowing me to participate in Vianney's care.  If you have any further questions, please do not hesitate to contact me.     Osvaldo Montanez M.D.  [facility], Director  Ambulatory Pediatric Cardiology   Division of Pediatric Cardiology  [facility]  [facility]   of Pediatrics, [employer] [employer]  [facility] [address] [place] [address]  [place] [id number]  Office:  [phone]  Fax:       [phone]  e-mail:  [email or web address]    I spent greater than 45 minutes in performance of this consultation, of which greater than 50% was related to coordination of care or counseling.

## 2024-10-25 NOTE — LETTER
2024     Jessica Ricci MD   [facility]  [facility]  [facility] [address]    Patient: Vianney Miner   YOB: 1985   Date of Visit: 10/25/2024       Dear Dr. Jessica Ricci MD:    Thank you for referring Vianney Miner to me for evaluation. Below are my notes for this consultation.  If you have questions, please do not hesitate to call me. I look forward to following your patient along with you.       Sincerely,     Osvaldo Montanez MD      CC: Radha Thompson, APRN-CNM, DNP  Kim Mackay, APRN-CNP  Caryn Cooper MD  ______________________________________________________________________________________    Vianney Miner was seen at the request of Jessica Ricci for a chief complaint of AMA and limited fetal heart views; a report with my findings is being sent via written or electronic means the referring physician with my recommendations for treatment.     I had the pleasure of seeing Vianney Miner in Pediatric Cardiology consultation at our [facility] location as part of our prenatal heart program for AMA and limited fetal heart views.  She is a 38 y.o. year-old  woman, currently 23w6d weeks gestation. Patient's last menstrual period was 2024. Estimated Date of Delivery: 2/15/25.  There have been no pregnancy complications.   She has not been hospitalized during this pregnancy.  She had a NIPT, which was normal.  She had a second trimester ultrasound which was normal, with limited fetal heart views.      Prior to the visit, I personally reviewed the cardiac portions of the obstetrical ultrasound performed on 10/2/24.  There is normal segmental anatomy with normal 4 chamber, outflow tract and 3 vessel views.  There is no evidence of septation defect, right or left ventricular outflow obstruction or significant valvular regurgitation.    Her previous obstetrical history is without complication.  Her past medical history  "is significant for depression and alcohol abuse.  She has no history of congenital heart disease, arrhythmia, cardiomyopathy, hypercholesterolemia, hypertension, diabetes, rheumatic heart disease, cancer, asthma, lupus, Sjogren syndrome, clotting disorder, anxiety, phenylketonuria, or DiGeorge.  She has had a laparoscopic endometrial ablation.  She takes Wellbutrin, Zofran, and Zoloft.  She has is allergic to amoxicillin-pot clavulanate and terconazole.  She is currently taking prenatal vitamins.      Her family history is negative for congenital heart disease, early atherosclerosis, sudden cardiac death, long QT syndrome, cardiomyopathy, aortic aneurysm, or genetic or metabolic disease.  FOB states his mother had an arrhythmia and his brother has A-fib and POTS.    She currently lives with her spouse and is .  She works as .  She is a former smoker and states that she quit in 2016.  She denies illicit drug use or alcohol abuse.  She denies verbal, sexual, or physical abuse.     Delivery Hospital: [facility]  Father of the baby's name: Kristofer    /69 (BP Location: Right arm, Patient Position: Sitting, BP Cuff Size: Adult)   Pulse 89   Ht 1.73 m (5' 8.11\")   Wt 85.5 kg (188 lb 7.9 oz)   LMP 05/11/2024   SpO2 98%   BMI 28.57 kg/m²     She was resting comfortably in the examination room and alert, active and in no respiratory distress. Skin was without rash.  HEENT: moist mucous membranes, no JVD, goiter. Breathing is not labored.  She was acyanotic.  There was no peripheral edema.   The abdomen was gravid, soft, nontender with normal bowel sounds.  The liver was not palpable.  The spleen tip was not palpable.  She had a normal gait and normal strength in all extremities.  Cranial nerves II - XII are intact.  She had no clubbing, cyanosis, or edema.    A two-dimensional and Doppler fetal echocardiogram was performed today and interpreted by me at 23w6d weeks gestation.  The fetal echocardiogram " showed normal segmental anatomy with no structural abnormalities found.  There is normal cardiac function.  There is no evidence of septation defect, right or left ventricular outflow obstruction or significant valvular regurgitation.  The fetal heart rate was within normal limits without ectopy or arrhythmia seen.  The spectral Doppler pattern across all valves, venous structures, and arterial structures was within normal limits.  There is no pericardial effusion.  Please see full report for details.    In summary, Vianney Miner is a 38 y.o. year-old  woman, currently 23w6d weeks gestation, who had a normal fetal echocardiogram at today's visit.  Therefore, we did not make any changes to her current delivery plan.  We did not prescribe any medications.  We did not recommend intervention.  She does not necessarily need to follow up with pediatric cardiology after the baby is born unless the pediatric team has any concerns or worries.     LOC: 0  Normal fetal echocardiogram within the limitations of ultrasound. No changes were made to current delivery plan. Triage code 0:  Delivery per OB at patient's preferred hospital.  Standard  care per  team.  Cardiology consult not necessary, unless there are clinical concerns.       Scribe's statement: IKim CNP, am scribing for, and in the presence of, Dr. Montanez.    Thank you for allowing me to participate in Vianney's care.  If you have any further questions, please do not hesitate to contact me.     Osvaldo Montanez M.D.  [facility], Director  Ambulatory Pediatric Cardiology   Division of Pediatric Cardiology  [facility]  [facility]   of Pediatrics, [facility] [facility] [employer]  [facility] [address]  [address] [place] [address]  [place] [address]  Office:  [phone]  Fax:        [phone]  e-mail:  [email or web address]    I spent greater than 45 minutes in performance of this consultation, of which greater than 50% was related to coordination of care or counseling.

## 2024-10-28 ENCOUNTER — TELEMEDICINE (OUTPATIENT)
Dept: OBSTETRICS AND GYNECOLOGY | Facility: CLINIC | Age: 39
End: 2024-10-28
Payer: COMMERCIAL

## 2024-10-28 DIAGNOSIS — M79.18 MYOFASCIAL PAIN SYNDROME: Primary | ICD-10-CM

## 2024-10-28 PROCEDURE — 99212 OFFICE O/P EST SF 10 MIN: CPT | Performed by: OBSTETRICS & GYNECOLOGY

## 2024-10-28 ASSESSMENT — ENCOUNTER SYMPTOMS
GASTROINTESTINAL NEGATIVE: 1
MUSCULOSKELETAL NEGATIVE: 1
CARDIOVASCULAR NEGATIVE: 1
EYES NEGATIVE: 1
RESPIRATORY NEGATIVE: 1
ENDOCRINE NEGATIVE: 1
NEUROLOGICAL NEGATIVE: 1
PSYCHIATRIC NEGATIVE: 1

## 2024-10-30 ENCOUNTER — APPOINTMENT (OUTPATIENT)
Dept: OBSTETRICS AND GYNECOLOGY | Facility: CLINIC | Age: 39
End: 2024-10-30
Payer: COMMERCIAL

## 2024-10-30 VITALS — BODY MASS INDEX: 29.1 KG/M2 | DIASTOLIC BLOOD PRESSURE: 70 MMHG | SYSTOLIC BLOOD PRESSURE: 108 MMHG | WEIGHT: 192 LBS

## 2024-10-30 DIAGNOSIS — O99.341 DEPRESSION AFFECTING PREGNANCY IN FIRST TRIMESTER, ANTEPARTUM (HHS-HCC): ICD-10-CM

## 2024-10-30 DIAGNOSIS — F32.A DEPRESSION AFFECTING PREGNANCY IN FIRST TRIMESTER, ANTEPARTUM (HHS-HCC): ICD-10-CM

## 2024-10-30 DIAGNOSIS — O09.519 ENCOUNTER FOR SUPERVISION OF HIGH RISK PREGNANCY DUE TO ADVANCED MATERNAL AGE IN PRIMIGRAVIDA: ICD-10-CM

## 2024-10-30 DIAGNOSIS — Z13.1 SCREENING FOR DIABETES MELLITUS (DM): ICD-10-CM

## 2024-10-30 DIAGNOSIS — Z3A.24 24 WEEKS GESTATION OF PREGNANCY (HHS-HCC): Primary | ICD-10-CM

## 2024-10-30 LAB
ERYTHROCYTE [DISTWIDTH] IN BLOOD BY AUTOMATED COUNT: 13 % (ref 11.5–14.5)
GLUCOSE 1H P 50 G GLC PO SERPL-MCNC: 158 MG/DL
HCT VFR BLD AUTO: 37.2 % (ref 36–46)
HGB BLD-MCNC: 11.7 G/DL (ref 12–16)
MCH RBC QN AUTO: 30.7 PG (ref 26–34)
MCHC RBC AUTO-ENTMCNC: 31.5 G/DL (ref 32–36)
MCV RBC AUTO: 98 FL (ref 80–100)
NRBC BLD-RTO: 0 /100 WBCS (ref 0–0)
PLATELET # BLD AUTO: 312 X10*3/UL (ref 150–450)
RBC # BLD AUTO: 3.81 X10*6/UL (ref 4–5.2)
REFLEX ADDED, ANEMIA PANEL: NORMAL
TREPONEMA PALLIDUM IGG+IGM AB [PRESENCE] IN SERUM OR PLASMA BY IMMUNOASSAY: NONREACTIVE
WBC # BLD AUTO: 11.4 X10*3/UL (ref 4.4–11.3)

## 2024-10-30 PROCEDURE — 0501F PRENATAL FLOW SHEET: CPT | Performed by: OBSTETRICS & GYNECOLOGY

## 2024-10-30 PROCEDURE — 85027 COMPLETE CBC AUTOMATED: CPT

## 2024-10-30 PROCEDURE — 86780 TREPONEMA PALLIDUM: CPT

## 2024-10-30 PROCEDURE — 82947 ASSAY GLUCOSE BLOOD QUANT: CPT

## 2024-10-30 RX ORDER — ONDANSETRON HYDROCHLORIDE 8 MG/1
TABLET, FILM COATED ORAL
COMMUNITY
Start: 2024-10-25

## 2024-11-01 DIAGNOSIS — Z13.1 SCREENING FOR DIABETES MELLITUS (DM): ICD-10-CM

## 2024-11-04 ENCOUNTER — LAB (OUTPATIENT)
Dept: LAB | Facility: LAB | Age: 39
End: 2024-11-04
Payer: COMMERCIAL

## 2024-11-04 DIAGNOSIS — Z13.1 SCREENING FOR DIABETES MELLITUS (DM): ICD-10-CM

## 2024-11-04 LAB
GLUCOSE 1H P 100 G GLC PO SERPL-MCNC: 175 MG/DL
GLUCOSE 2H P 100 G GLC PO SERPL-MCNC: 109 MG/DL
GLUCOSE 3H P 100 G GLC PO SERPL-MCNC: 106 MG/DL
GLUCOSE P FAST SERPL-MCNC: 81 MG/DL

## 2024-11-04 PROCEDURE — 82950 GLUCOSE TEST: CPT

## 2024-11-04 PROCEDURE — 36415 COLL VENOUS BLD VENIPUNCTURE: CPT

## 2024-11-04 PROCEDURE — 82952 GTT-ADDED SAMPLES: CPT

## 2024-11-04 PROCEDURE — 82947 ASSAY GLUCOSE BLOOD QUANT: CPT

## 2024-11-07 DIAGNOSIS — O21.9 VOMITING OF PREGNANCY, UNSPECIFIED: ICD-10-CM

## 2024-11-08 RX ORDER — ONDANSETRON HYDROCHLORIDE 8 MG/1
TABLET, FILM COATED ORAL
Qty: 20 TABLET | Refills: 2 | Status: SHIPPED | OUTPATIENT
Start: 2024-11-08

## 2024-11-18 ENCOUNTER — APPOINTMENT (OUTPATIENT)
Dept: OBSTETRICS AND GYNECOLOGY | Facility: CLINIC | Age: 39
End: 2024-11-18
Payer: COMMERCIAL

## 2024-11-18 VITALS — DIASTOLIC BLOOD PRESSURE: 70 MMHG | WEIGHT: 195.8 LBS | SYSTOLIC BLOOD PRESSURE: 109 MMHG | BODY MASS INDEX: 29.68 KG/M2

## 2024-11-18 DIAGNOSIS — Z29.13 NEED FOR RHOGAM DUE TO RH NEGATIVE MOTHER: ICD-10-CM

## 2024-11-18 DIAGNOSIS — Z3A.24 24 WEEKS GESTATION OF PREGNANCY (HHS-HCC): Primary | ICD-10-CM

## 2024-11-18 DIAGNOSIS — F32.A DEPRESSION AFFECTING PREGNANCY IN FIRST TRIMESTER, ANTEPARTUM (HHS-HCC): ICD-10-CM

## 2024-11-18 DIAGNOSIS — Z23 ENCOUNTER TO VACCINATE PATIENT: ICD-10-CM

## 2024-11-18 DIAGNOSIS — O09.519 ENCOUNTER FOR SUPERVISION OF HIGH RISK PREGNANCY DUE TO ADVANCED MATERNAL AGE IN PRIMIGRAVIDA: ICD-10-CM

## 2024-11-18 DIAGNOSIS — O99.341 DEPRESSION AFFECTING PREGNANCY IN FIRST TRIMESTER, ANTEPARTUM (HHS-HCC): ICD-10-CM

## 2024-11-18 LAB
ABO GROUP (TYPE) IN BLOOD: NORMAL
ANTIBODY SCREEN: NORMAL
RH FACTOR (ANTIGEN D): NORMAL

## 2024-11-18 PROCEDURE — 86850 RBC ANTIBODY SCREEN: CPT

## 2024-11-18 PROCEDURE — 90471 IMMUNIZATION ADMIN: CPT | Performed by: OBSTETRICS & GYNECOLOGY

## 2024-11-18 PROCEDURE — 96372 THER/PROPH/DIAG INJ SC/IM: CPT | Performed by: OBSTETRICS & GYNECOLOGY

## 2024-11-18 PROCEDURE — 90715 TDAP VACCINE 7 YRS/> IM: CPT | Performed by: OBSTETRICS & GYNECOLOGY

## 2024-11-18 PROCEDURE — 0501F PRENATAL FLOW SHEET: CPT | Performed by: OBSTETRICS & GYNECOLOGY

## 2024-11-18 PROCEDURE — 86870 RBC ANTIBODY IDENTIFICATION: CPT

## 2024-11-18 PROCEDURE — 86900 BLOOD TYPING SEROLOGIC ABO: CPT

## 2024-11-18 PROCEDURE — 86901 BLOOD TYPING SEROLOGIC RH(D): CPT

## 2024-11-18 ASSESSMENT — EDINBURGH POSTNATAL DEPRESSION SCALE (EPDS)
I HAVE LOOKED FORWARD WITH ENJOYMENT TO THINGS: AS MUCH AS I EVER DID
TOTAL SCORE: 2
I HAVE BLAMED MYSELF UNNECESSARILY WHEN THINGS WENT WRONG: NOT VERY OFTEN
I HAVE FELT SCARED OR PANICKY FOR NO GOOD REASON: NO, NOT AT ALL
I HAVE BEEN ANXIOUS OR WORRIED FOR NO GOOD REASON: NO, NOT AT ALL
I HAVE BEEN SO UNHAPPY THAT I HAVE HAD DIFFICULTY SLEEPING: NOT AT ALL
THINGS HAVE BEEN GETTING ON TOP OF ME: NO, MOST OF THE TIME I HAVE COPED QUITE WELL
THE THOUGHT OF HARMING MYSELF HAS OCCURRED TO ME: NEVER
I HAVE BEEN ABLE TO LAUGH AND SEE THE FUNNY SIDE OF THINGS: AS MUCH AS I ALWAYS COULD
I HAVE FELT SAD OR MISERABLE: NO, NOT AT ALL
I HAVE BEEN SO UNHAPPY THAT I HAVE BEEN CRYING: NO, NEVER

## 2024-11-18 NOTE — PROGRESS NOTES
Patient presents for OBFU  T&S with Rhogam today  Tdap: Accepts   EPDS = 2  C/O: None     Radha Toussaint MA II    Routine prenatal visit     Subjective    HPI:  No physical complaints.  Elevated glucola but passed 3 hour GTT.  Rhogam and Tdap given today.  Has 30 week f/u USN.     Objective    Vital Signs  /70   Wt 88.8 kg (195 lb 12.8 oz)   LMP 2024   BMI 29.68 kg/m²     Vianney Miner is a 38 y.o. yo  at 27w2d here for the following concerns which we addressed today:     Medical Problems       Problem List       Myofascial pain syndrome    Pelvic floor dysfunction    Overactive bladder    Glen's disease    Chronic pelvic pain in female    Depression    Interstitial cystitis    24 weeks gestation of pregnancy ([facility])    Overview Addendum 2024  5:28 PM by Caryn Cooper MD     ** Previa RESOLVED on follow up anatomic USN  <> 30 week USN follow up     Desired provider in labor: [] CNM  [x] Physician  [x] Blood Products: [x] Yes, accepts [] No, needs counseling  [x] Initial BMI: 22.81   [x] Prenatal Labs:   [x] Cervical Cancer Screening up to date- 2022 - nml, negative HPV   [x] Rh status: Negative   [x] Genetic Screening:  RR NIPS - It's a GIRL   [x] NT US: (11-13 wks): WNL   [x] Baby ASA (if indicated): started 24  [x] Pregnancy dated by:  LMP c/w USN at 7 weeks     [x] Anatomy US: (19-20 wks): WNL, had normal fetal echo   [x] 1hr GCT at 24-28wks: elevated but normal 3 hour GTT   [x] Rhogam (if indicated): given    [] Fetal Surveillance (if indicated):  [x] Tdap: given    [] RSV (32-36 wks) (Sept. to end of ):   [x] Flu Vaccine: Given   [x] Updated COVID vaccine recommended     [] Breastfeeding:  [] Postpartum Birth control method:   [] GBS at 36 - 37 wks:  [] 39 weeks discussion of IOL vs. Expectant management:  [] Mode of delivery ( anticipated ):           Encounter for supervision of high risk pregnancy due to advanced maternal age in  primigravida    Overview Addendum 9/4/2024  1:43 PM by Caryn Cooper MD     - RR NIPS  - Taking prophylactic ASA          Depression affecting pregnancy in first trimester, antepartum ([facility])    Overview Addendum 8/12/2024  2:32 PM by Caryn Cooper MD     - on wellbutrin 150mg daily and zoloft 25mg daily   - managed by PCP          Blood type, Rh negative        Follow up in 2 week(s).

## 2024-11-19 ENCOUNTER — ROUTINE PRENATAL (OUTPATIENT)
Dept: OBSTETRICS AND GYNECOLOGY | Facility: CLINIC | Age: 39
End: 2024-11-19
Payer: COMMERCIAL

## 2024-11-19 VITALS — DIASTOLIC BLOOD PRESSURE: 72 MMHG | BODY MASS INDEX: 29.83 KG/M2 | SYSTOLIC BLOOD PRESSURE: 116 MMHG | WEIGHT: 196.8 LBS

## 2024-11-19 DIAGNOSIS — B37.31 VAGINAL YEAST INFECTION: Primary | ICD-10-CM

## 2024-11-19 DIAGNOSIS — R39.9 UTI SYMPTOMS: ICD-10-CM

## 2024-11-19 DIAGNOSIS — O09.519 ENCOUNTER FOR SUPERVISION OF HIGH RISK PREGNANCY DUE TO ADVANCED MATERNAL AGE IN PRIMIGRAVIDA: ICD-10-CM

## 2024-11-19 DIAGNOSIS — Z3A.24 24 WEEKS GESTATION OF PREGNANCY (HHS-HCC): ICD-10-CM

## 2024-11-19 DIAGNOSIS — F32.A DEPRESSION AFFECTING PREGNANCY IN FIRST TRIMESTER, ANTEPARTUM (HHS-HCC): ICD-10-CM

## 2024-11-19 DIAGNOSIS — O99.341 DEPRESSION AFFECTING PREGNANCY IN FIRST TRIMESTER, ANTEPARTUM (HHS-HCC): ICD-10-CM

## 2024-11-19 PROCEDURE — 87086 URINE CULTURE/COLONY COUNT: CPT

## 2024-11-19 PROCEDURE — 0501F PRENATAL FLOW SHEET: CPT

## 2024-11-19 PROCEDURE — 87205 SMEAR GRAM STAIN: CPT

## 2024-11-19 RX ORDER — CLOTRIMAZOLE AND BETAMETHASONE DIPROPIONATE 10; .64 MG/G; MG/G
1 CREAM TOPICAL 2 TIMES DAILY
Qty: 45 G | Refills: 0 | Status: SHIPPED | OUTPATIENT
Start: 2024-11-19

## 2024-11-19 RX ORDER — TERCONAZOLE 4 MG/G
1 CREAM VAGINAL NIGHTLY
Qty: 45 G | Refills: 0 | Status: SHIPPED | OUTPATIENT
Start: 2024-11-19

## 2024-11-19 ASSESSMENT — EDINBURGH POSTNATAL DEPRESSION SCALE (EPDS)
I HAVE FELT SCARED OR PANICKY FOR NO GOOD REASON: NO, NOT AT ALL
I HAVE BLAMED MYSELF UNNECESSARILY WHEN THINGS WENT WRONG: NO, NEVER
I HAVE BEEN ANXIOUS OR WORRIED FOR NO GOOD REASON: NO, NOT AT ALL
TOTAL SCORE: 0
I HAVE LOOKED FORWARD WITH ENJOYMENT TO THINGS: AS MUCH AS I EVER DID
I HAVE FELT SAD OR MISERABLE: NO, NOT AT ALL
THINGS HAVE BEEN GETTING ON TOP OF ME: NO, I HAVE BEEN COPING AS WELL AS EVER
I HAVE BEEN SO UNHAPPY THAT I HAVE HAD DIFFICULTY SLEEPING: NOT AT ALL
I HAVE BEEN ABLE TO LAUGH AND SEE THE FUNNY SIDE OF THINGS: AS MUCH AS I ALWAYS COULD
THE THOUGHT OF HARMING MYSELF HAS OCCURRED TO ME: NEVER
I HAVE BEEN SO UNHAPPY THAT I HAVE BEEN CRYING: NO, NEVER

## 2024-11-19 NOTE — PROGRESS NOTES
Ob Visit  24     SUBJECTIVE      HPI: Vianney Miner is a 38 y.o.  at 27w4d here for problem visit in pregnancy. She was seen for routine prenatal care yesterday but today presents with c/o vaginal burning, itching, and rawness. She reports increased vaginal discharge. She denies new soaps or detergents. She repots no odor.       She has no contractions, bleeding, or LOF. Reports normal fetal movement.        OBJECTIVE  Visit Vitals  /72   Wt 89.3 kg (196 lb 12.8 oz)   LMP 2024   BMI 29.83 kg/m²   OB Status Pregnant   Smoking Status Former   BSA 2.07 m²      Erythematic vaginal tissue. Small amount of thick white vaginal discharge.  Yeast-like in appearance       ASSESSMENT & PLAN    Vianney Miner is a 38 y.o.  at 27w4d here for the following concerns we addressed today:    Problem List Items Addressed This Visit       24 weeks gestation of pregnancy ([facility])    Overview     ** Previa RESOLVED on follow up anatomic USN  <> 30 week USN follow up     Desired provider in labor: [] CNM  [x] Physician  [x] Blood Products: [x] Yes, accepts [] No, needs counseling  [x] Initial BMI: 22.81   [x] Prenatal Labs:   [x] Cervical Cancer Screening up to date- 2022 - nml, negative HPV   [x] Rh status: Negative   [x] Genetic Screening:  RR NIPS - It's a GIRL   [x] NT US: (11-13 wks): WNL   [x] Baby ASA (if indicated): started 24  [x] Pregnancy dated by:  LMP c/w USN at 7 weeks     [x] Anatomy US: (19-20 wks): WNL, had normal fetal echo   [x] 1hr GCT at 24-28wks: elevated but normal 3 hour GTT   [x] Rhogam (if indicated): given    [] Fetal Surveillance (if indicated):  [x] Tdap: given    [] RSV (32-36 wks) (Sept. to end of ):   [x] Flu Vaccine: Given   [x] Updated COVID vaccine recommended     [] Breastfeeding:  [] Postpartum Birth control method:   [] GBS at 36 - 37 wks:  [] 39 weeks discussion of IOL vs. Expectant management:  [] Mode of delivery ( anticipated  ):           Depression affecting pregnancy in first trimester, antepartum ([facility])    Overview     - on wellbutrin 150mg daily and zoloft 25mg daily   - managed by PCP          Encounter for supervision of high risk pregnancy due to advanced maternal age in primigravida    Overview     - RR NIPS  - Taking prophylactic ASA           Other Visit Diagnoses       Vaginal yeast infection    -  Primary    Relevant Medications    clotrimazole-betamethasone (Lotrisone) cream    terconazole (Terazol 7) 0.4 % vaginal cream    Other Relevant Orders    Vaginitis Gram Stain For Bacterial Vaginosis + Yeast    UTI symptoms        Relevant Orders    Urine culture            Reviewed comfort measures for yeast in pregnancy. Discussed mild soaps and detergents for vaginal PH.    RTC in 2 weeks      TRUDY Zavaleta-MITZY

## 2024-11-20 LAB
BB ANTIBODY IDENTIFICATION: NORMAL
CASE #: NORMAL

## 2024-11-21 LAB
BACTERIA UR CULT: NORMAL
CLUE CELLS VAG LPF-#/AREA: NORMAL /[LPF]
NUGENT SCORE: 0
YEAST VAG WET PREP-#/AREA: NORMAL

## 2024-11-22 ENCOUNTER — APPOINTMENT (OUTPATIENT)
Dept: OBSTETRICS AND GYNECOLOGY | Facility: CLINIC | Age: 39
End: 2024-11-22
Payer: COMMERCIAL

## 2024-11-26 DIAGNOSIS — F33.1 MODERATE EPISODE OF RECURRENT MAJOR DEPRESSIVE DISORDER: ICD-10-CM

## 2024-11-26 RX ORDER — BUPROPION HYDROCHLORIDE 150 MG/1
150 TABLET ORAL EVERY MORNING
Qty: 90 TABLET | Refills: 1 | Status: SHIPPED | OUTPATIENT
Start: 2024-11-26 | End: 2025-05-25

## 2024-12-02 PROBLEM — Z3A.29 29 WEEKS GESTATION OF PREGNANCY (HHS-HCC): Status: ACTIVE | Noted: 2024-07-01

## 2024-12-03 ENCOUNTER — APPOINTMENT (OUTPATIENT)
Dept: OBSTETRICS AND GYNECOLOGY | Facility: CLINIC | Age: 39
End: 2024-12-03
Payer: COMMERCIAL

## 2024-12-03 VITALS — DIASTOLIC BLOOD PRESSURE: 67 MMHG | BODY MASS INDEX: 30.25 KG/M2 | SYSTOLIC BLOOD PRESSURE: 106 MMHG | WEIGHT: 199.6 LBS

## 2024-12-03 DIAGNOSIS — Z3A.29 29 WEEKS GESTATION OF PREGNANCY (HHS-HCC): Primary | ICD-10-CM

## 2024-12-03 PROCEDURE — 0501F PRENATAL FLOW SHEET: CPT | Performed by: STUDENT IN AN ORGANIZED HEALTH CARE EDUCATION/TRAINING PROGRAM

## 2024-12-03 NOTE — PROGRESS NOTES
Subjective   Patient ID [id number]   Vianney Miner is a 39 y.o.  at 29w3d with a working estimated date of delivery of 2/15/2025, by Last Menstrual Period who presents for a routine prenatal visit. Good FM, no OB complaints.    Objective   Physical Exam  Vitals:    24 1005   BP: 106/67      Weight: 90.5 kg (199 lb 9.6 oz), Pregravid BMI: 22.73  Expected Total Weight Gain: 11.5 kg (25 lb)-16 kg (35 lb)   Fundal height and FHR per prenatal flowsheet    Assessment/Plan     Vianney Miner is a 39 y.o.  at 29w3d with a working estimated date of delivery of 2/15/2025, by Last Menstrual Period who presents for a routine prenatal visit.    Placenta previa resolved on most recent scan. Has 30 week growth scheduled. Received tdap/Rhogam last visit. Discussed RSV vaccine, planning. Planning to breast feed, working on pump. Encouraged to choose a pediatrician. Discussed contraception - has endometriosis and did not do well with pills in the past. Has completed childbearing. Discussed Mirena vs vasectomy vs BTL. Reviewed r/b of each. Leaning toward interval Mirena for added menstrual benefit.  Remainder of plan per problem list as below.     Problem List Items Addressed This Visit       29 weeks gestation of pregnancy ([facility]) - Primary    Overview     ** Previa RESOLVED on follow up anatomic USN  <> 30 week USN follow up     Desired provider in labor: [] CNM  [x] Physician  [x] Blood Products: [x] Yes, accepts [] No, needs counseling  [x] Initial BMI: 22.81   [x] Prenatal Labs:   [x] Cervical Cancer Screening up to date- 2022 - nml, negative HPV   [x] Rh status: Negative   [x] Genetic Screening:  RR NIPS - It's a GIRL   [x] NT US: (11-13 wks): WNL   [x] Baby ASA (if indicated): started 24  [x] Pregnancy dated by:  LMP c/w USN at 7 weeks     [x] Anatomy US: (19-20 wks): WNL, had normal fetal echo   [x] 1hr GCT at 24-28wks: elevated but normal 3 hour GTT   [x] Rhogam (if indicated): given     [] Fetal Surveillance (if indicated):  [x] Tdap: given    [] RSV (32-36 wks) (Sept. to end ):   [x] Flu Vaccine: Given   [x] Updated COVID vaccine recommended     [x] Breastfeeding: breast, working on pump  [x] Postpartum Birth control method: considering interval Mirena vs +/- vasectomy  [] GBS at 36 - 37 wks:  [] 39 weeks discussion of IOL vs. Expectant management:  [x] Mode of delivery ( anticipated ):           Follow up in 2 weeks for a routine prenatal visit.    Aislinn Reich MD

## 2024-12-14 ASSESSMENT — DERMATOLOGY QUALITY OF LIFE (QOL) ASSESSMENT
RATE HOW BOTHERED YOU ARE BY SYMPTOMS OF YOUR SKIN PROBLEM (EG, ITCHING, STINGING BURNING, HURTING OR SKIN IRRITATION): 0 - NEVER BOTHERED
RATE HOW BOTHERED YOU ARE BY EFFECTS OF YOUR SKIN PROBLEMS ON YOUR ACTIVITIES (EG, GOING OUT, ACCOMPLISHING WHAT YOU WANT, WORK ACTIVITIES OR YOUR RELATIONSHIPS WITH OTHERS): 0 - NEVER BOTHERED
RATE HOW EMOTIONALLY BOTHERED YOU ARE BY YOUR SKIN PROBLEM (FOR EXAMPLE, WORRY, EMBARRASSMENT, FRUSTRATION): 0 - NEVER BOTHERED
RATE HOW BOTHERED YOU ARE BY SYMPTOMS OF YOUR SKIN PROBLEM (EG, ITCHING, STINGING BURNING, HURTING OR SKIN IRRITATION): 0 - NEVER BOTHERED
RATE HOW EMOTIONALLY BOTHERED YOU ARE BY YOUR SKIN PROBLEM (FOR EXAMPLE, WORRY, EMBARRASSMENT, FRUSTRATION): 0 - NEVER BOTHERED
RATE HOW BOTHERED YOU ARE BY EFFECTS OF YOUR SKIN PROBLEMS ON YOUR ACTIVITIES (EG, GOING OUT, ACCOMPLISHING WHAT YOU WANT, WORK ACTIVITIES OR YOUR RELATIONSHIPS WITH OTHERS): 0 - NEVER BOTHERED
WHAT SINGLE SKIN CONDITION LISTED BELOW IS THE PATIENT ANSWERING THE QUALITY-OF-LIFE ASSESSMENT QUESTIONS ABOUT: ACNE
WHAT SINGLE SKIN CONDITION LISTED BELOW IS THE PATIENT ANSWERING THE QUALITY-OF-LIFE ASSESSMENT QUESTIONS ABOUT: ACNE

## 2024-12-14 ASSESSMENT — PATIENT GLOBAL ASSESSMENT (PGA): WHAT IS THE PGA: PATIENT GLOBAL ASSESSMENT:  2 - MILD

## 2024-12-16 ENCOUNTER — HOSPITAL ENCOUNTER (OUTPATIENT)
Dept: RADIOLOGY | Facility: CLINIC | Age: 39
Discharge: HOME | End: 2024-12-16
Payer: COMMERCIAL

## 2024-12-16 DIAGNOSIS — Z01.419 WELL WOMAN EXAM WITH ROUTINE GYNECOLOGICAL EXAM: ICD-10-CM

## 2024-12-16 DIAGNOSIS — Z03.74 SUSPECTED PROBLEM WITH FETAL GROWTH NOT FOUND: ICD-10-CM

## 2024-12-16 DIAGNOSIS — O09.519 AMA (ADVANCED MATERNAL AGE) PRIMIGRAVIDA 35+ (HHS-HCC): ICD-10-CM

## 2024-12-16 PROCEDURE — 76819 FETAL BIOPHYS PROFIL W/O NST: CPT | Performed by: OBSTETRICS & GYNECOLOGY

## 2024-12-16 PROCEDURE — 76816 OB US FOLLOW-UP PER FETUS: CPT

## 2024-12-16 PROCEDURE — 76817 TRANSVAGINAL US OBSTETRIC: CPT | Performed by: OBSTETRICS & GYNECOLOGY

## 2024-12-16 PROCEDURE — 76816 OB US FOLLOW-UP PER FETUS: CPT | Performed by: OBSTETRICS & GYNECOLOGY

## 2024-12-16 PROCEDURE — 76819 FETAL BIOPHYS PROFIL W/O NST: CPT

## 2024-12-18 ENCOUNTER — APPOINTMENT (OUTPATIENT)
Dept: DERMATOLOGY | Facility: CLINIC | Age: 39
End: 2024-12-18
Payer: COMMERCIAL

## 2024-12-18 ENCOUNTER — ROUTINE PRENATAL (OUTPATIENT)
Dept: OBSTETRICS AND GYNECOLOGY | Facility: CLINIC | Age: 39
End: 2024-12-18
Payer: COMMERCIAL

## 2024-12-18 VITALS — DIASTOLIC BLOOD PRESSURE: 76 MMHG | SYSTOLIC BLOOD PRESSURE: 115 MMHG | BODY MASS INDEX: 30.77 KG/M2 | WEIGHT: 203 LBS

## 2024-12-18 DIAGNOSIS — L73.9 FOLLICULITIS: Primary | ICD-10-CM

## 2024-12-18 DIAGNOSIS — O99.341 DEPRESSION AFFECTING PREGNANCY IN FIRST TRIMESTER, ANTEPARTUM (HHS-HCC): ICD-10-CM

## 2024-12-18 DIAGNOSIS — L72.0 EPIDERMAL INCLUSION CYST: ICD-10-CM

## 2024-12-18 DIAGNOSIS — K21.9 GASTROESOPHAGEAL REFLUX DISEASE WITHOUT ESOPHAGITIS: ICD-10-CM

## 2024-12-18 DIAGNOSIS — F32.A DEPRESSION AFFECTING PREGNANCY IN FIRST TRIMESTER, ANTEPARTUM (HHS-HCC): ICD-10-CM

## 2024-12-18 DIAGNOSIS — Z3A.31 31 WEEKS GESTATION OF PREGNANCY (HHS-HCC): Primary | ICD-10-CM

## 2024-12-18 PROCEDURE — 1036F TOBACCO NON-USER: CPT | Performed by: DERMATOLOGY

## 2024-12-18 PROCEDURE — 0501F PRENATAL FLOW SHEET: CPT | Performed by: OBSTETRICS & GYNECOLOGY

## 2024-12-18 PROCEDURE — 99213 OFFICE O/P EST LOW 20 MIN: CPT | Performed by: DERMATOLOGY

## 2024-12-18 RX ORDER — PANTOPRAZOLE SODIUM 40 MG/1
40 TABLET, DELAYED RELEASE ORAL
Qty: 30 TABLET | Refills: 11 | Status: SHIPPED | OUTPATIENT
Start: 2024-12-18 | End: 2025-12-18

## 2024-12-18 ASSESSMENT — ITCH NUMERIC RATING SCALE: HOW SEVERE IS YOUR ITCHING?: 0

## 2024-12-18 NOTE — PROGRESS NOTES
Subjective     Vianney Miner is a 39 y.o. female who presents for the following: Folliculitis  (Chest/back- pt currently pregnant- states is using the azelaic acid prn, tretinoin is on hold per pt until after she has the baby, using the sulfur cleanser prn and the ketoconazole cleanser prn. States mostly clear today. ) and Suspicious Skin Lesion (Left cheek lesion- Pt denies personal history of skin cancer.  Pt reports family history non melanoma skin cancer. ).     Review of Systems:  No other skin or systemic complaints other than what is documented elsewhere in the note.    The following portions of the chart were reviewed this encounter and updated as appropriate:   Tobacco  Allergies  Meds  Problems  Med Hx  Surg Hx  Fam Hx              Objective   Well appearing patient in no apparent distress; mood and affect are within normal limits.    A focused skin examination was performed. All findings within normal limits unless otherwise noted below.    Assessment/Plan   1. Folliculitis  Chest - Medial (Center), Left Upper Back, Right Upper Back  Clear today    -Continue azelaic acid prn  -Continue sulfacetamide sulfur cleanser from [employer] as needed  -Patient is currently pregnant; stay off tretinoin and ketoconazole cleanser during pregnancy/lactation  -Patient declines need for refills at this time    Related Medications  azelaic acid (Finacea) 15 % gel  Apply to affected areas once daily    sulfacetamide sodium-sulfur 9.8-4.8 % cleanser  Apply to the affected areas once daily every other day. Let sit for 2 minutes, rinse thoroughly.    ketoconazole (NIZOral) 2 % shampoo  Apply to the affected areas once daily every other day. Let sit for 2 minutes, rinse thoroughly.    2. Epidermal inclusion cyst  Left Buccal Cheek  Subcutaneous papule with normal-appearing overlying skin and connecting pore    -Discussed nature of the condition  -Reassurance, recommend observation at this time        Follow  up in 6 months for FSE, 1 year for Folliculitis  Discussed if there are any changes or development of concerning symptoms (lesion/skin condition is changing, bleeding, enlarging, or worsening) the patient is to contact my office. The patient verbalizes understanding.    Radha Ji MD  12/18/2024

## 2024-12-18 NOTE — PROGRESS NOTES
Patient presents for OBFU  C/O: acid reflux and heart burn      Radha Toussaint MA II    Routine prenatal visit     Subjective    HPI:  Feeling OK overall. Has been struggling with reflux - pepcid isn't helping.  Prescription for protonix sent.  Discussed contraception.  Partner planning vasectomy.  She [place] [facility] [place] that she could have a B[place] at the time of c/s if she needed a c/s for any reason - she will continue to think about this for now.  RSV vaccine next visit.     Objective    Vital Signs  /76   Wt 92.1 kg (203 lb)   LMP 2024   BMI 30.77 kg/m²     Vianney Miner is a 39 y.o. yo  at 31w4d here for the following concerns which we addressed today:     Medical Problems       Problem List       Myofascial pain syndrome    Pelvic floor dysfunction    Overactive bladder    Glen's disease    Chronic pelvic pain in female    Depression    Interstitial cystitis    31 weeks gestation of pregnancy ([facility])    Overview Addendum 2024 12:24 PM by Caryn Cooper MD     ** Previa RESOLVED on follow up anatomic USN  - Repeat USN 31.2 - EFW 54% with no evidence of previa     Desired provider in labor: [] CNM  [x] Physician  [x] Blood Products: [x] Yes, accepts [] No, needs counseling  [x] Initial BMI: 22.81   [x] Prenatal Labs:   [x] Cervical Cancer Screening up to date- 2022 - nml, negative HPV   [x] Rh status: Negative   [x] Genetic Screening:  RR NIPS - It's a GIRL   [x] NT US: (11-13 wks): WNL   [x] Baby ASA (if indicated): started 24  [x] Pregnancy dated by:  LMP c/w USN at 7 weeks     [x] Anatomy US: (19-20 wks): WNL, had normal fetal echo   [x] 1hr GCT at 24-28wks: elevated but normal 3 hour GTT   [x] Rhogam (if indicated): given    [] Fetal Surveillance (if indicated):  [x] Tdap: given    [] RSV (32-36 wks): given    [x] Flu Vaccine: Given   [x] Updated COVID vaccine recommended     [x] Breastfeeding: breast, working on pump  [x]  Postpartum Birth control method: partner planning vasectomy; may want delivery at [facility] for possible if she needed a c/s for any reason   [] GBS at 36 - 37 wks:  [] 39 weeks discussion of IOL vs. Expectant management:  [x] Mode of delivery ( anticipated ):          Encounter for supervision of high risk pregnancy due to advanced maternal age in primigravida    Overview Addendum 2024  1:43 PM by Caryn Cooper MD     - RR NIPS  - Taking prophylactic ASA          Depression affecting pregnancy in first trimester, antepartum ([facility])    Overview Addendum 2024  2:32 PM by Caryn Cooper MD     - on wellbutrin 150mg daily and zoloft 25mg daily   - managed by PCP          Blood type, Rh negative        Follow up in 2 week(s).

## 2025-01-02 ENCOUNTER — TELEPHONE (OUTPATIENT)
Dept: OBSTETRICS AND GYNECOLOGY | Facility: CLINIC | Age: 40
End: 2025-01-02
Payer: COMMERCIAL

## 2025-01-02 NOTE — TELEPHONE ENCOUNTER
Pt called OB line at 33w5d with concerns of a cold and if she needs to rescheduled her obfu for tomorrow 1/3/25. Advised that as long as she is not Covid+, having diarrhea or vomiting or running a fever, she can still be seen. She may be asked to wear a mask upon check in. In regards to her RSV vaccine, the office is currently out of stock and she will be offered this at her following visit. Reviewed safe OTC medication for cold and to hydrate with rest. Pt vocalized understanding, no further questions at this time.

## 2025-01-03 ENCOUNTER — APPOINTMENT (OUTPATIENT)
Dept: OBSTETRICS AND GYNECOLOGY | Facility: CLINIC | Age: 40
End: 2025-01-03
Payer: COMMERCIAL

## 2025-01-03 VITALS — DIASTOLIC BLOOD PRESSURE: 81 MMHG | BODY MASS INDEX: 31.65 KG/M2 | WEIGHT: 208.8 LBS | SYSTOLIC BLOOD PRESSURE: 121 MMHG

## 2025-01-03 DIAGNOSIS — Z23 NEED FOR VACCINATION: ICD-10-CM

## 2025-01-03 DIAGNOSIS — Z3A.33 33 WEEKS GESTATION OF PREGNANCY (HHS-HCC): Primary | ICD-10-CM

## 2025-01-03 NOTE — PROGRESS NOTES
Subjective   Patient ID [id number]   Vianney Miner is a 39 y.o.  at 33w6d with a working estimated date of delivery of 2/15/2025, by Last Menstrual Period who presents for a routine prenatal visit. Good FM, no OB complaints.    Objective   Physical Exam  Vitals:    25 1359   BP: 121/81      Weight: 94.7 kg (208 lb 12.8 oz), Pregravid BMI: 22.73  Expected Total Weight Gain: 11.5 kg (25 lb)-16 kg (35 lb)   Fundal height and FHR per prenatal flowsheet    Assessment/Plan     Vianney Miner is a 39 y.o.  at 33w6d with a working estimated date of delivery of 2/15/2025, by Last Menstrual Period who presents for a routine prenatal visit.    RSV today. Was considering delivery at [facility] as she would want a BTL if she had a c/s for any reason. Now leaning toward delivery at [facility]. Has a history of endometriosis and is interested in interval Mirena. FOB also considering vasectomy. Discussed IOL vs spontaneous labor. Considering IOL as she would like to deliver before  (stepchild has birthday on this day). Schedule next visit if desired. Remainder of plan per problem list as below.     Problem List Items Addressed This Visit       33 weeks gestation of pregnancy ([facility]) - Primary    Overview     ** Previa RESOLVED on follow up anatomic USN  - Repeat USN 31.2 - EFW 54% with no evidence of previa     Desired provider in labor: [] CNM  [x] Physician  [x] Blood Products: [x] Yes, accepts [] No, needs counseling  [x] Initial BMI: 22.81   [x] Prenatal Labs:   [x] Cervical Cancer Screening up to date- 2022 - nml, negative HPV   [x] Rh status: Negative   [x] Genetic Screening:  RR NIPS - It's a GIRL   [x] NT US: (11-13 wks): WNL   [x] Baby ASA (if indicated): started 24  [x] Pregnancy dated by:  LMP c/w USN at 7 weeks     [x] Anatomy US: (19-20 wks): WNL, had normal fetal echo   [x] 1hr GCT at 24-28wks: elevated but normal 3 hour GTT   [x] Rhogam (if indicated): given    [] Fetal  Surveillance (if indicated):  [x] Tdap: given    [x] RSV (32-36 wks): given 1/3  [x] Flu Vaccine: Given   [x] Updated COVID vaccine recommended     [x] Breastfeeding: has breast pump   [x] Postpartum Birth control method: partner planning vasectomy; may also elect for interval Mirena (endometriosis)   [] GBS at 36 - 37 wks:  [] 39 weeks discussion of IOL vs. Expectant management: considering IOL before , schedule next visit  [x] Mode of delivery ( anticipated ):           Other Visit Diagnoses       Need for vaccination        Relevant Orders    Respiratory Syncytial Virus (RSV), Eligible Pregnant Pts, 0.5 mL (ABRYSVO)          Follow up in 2 weeks for a routine prenatal visit.    Aislinn Reich MD

## 2025-01-09 ENCOUNTER — HOSPITAL ENCOUNTER (OUTPATIENT)
Facility: HOSPITAL | Age: 40
End: 2025-01-09
Attending: OBSTETRICS & GYNECOLOGY | Admitting: OBSTETRICS & GYNECOLOGY
Payer: COMMERCIAL

## 2025-01-09 ENCOUNTER — HOSPITAL ENCOUNTER (OUTPATIENT)
Facility: HOSPITAL | Age: 40
Discharge: HOME | End: 2025-01-09
Attending: OBSTETRICS & GYNECOLOGY | Admitting: OBSTETRICS & GYNECOLOGY
Payer: COMMERCIAL

## 2025-01-09 VITALS
DIASTOLIC BLOOD PRESSURE: 83 MMHG | SYSTOLIC BLOOD PRESSURE: 134 MMHG | BODY MASS INDEX: 31.64 KG/M2 | HEIGHT: 68 IN | WEIGHT: 208.78 LBS | OXYGEN SATURATION: 97 % | HEART RATE: 94 BPM

## 2025-01-09 PROCEDURE — 59025 FETAL NON-STRESS TEST: CPT | Performed by: OBSTETRICS & GYNECOLOGY

## 2025-01-09 PROCEDURE — 99213 OFFICE O/P EST LOW 20 MIN: CPT | Mod: 25

## 2025-01-09 PROCEDURE — 99214 OFFICE O/P EST MOD 30 MIN: CPT | Performed by: OBSTETRICS & GYNECOLOGY

## 2025-01-09 RX ORDER — NIFEDIPINE 10 MG/1
10 CAPSULE ORAL ONCE AS NEEDED
Status: DISCONTINUED | OUTPATIENT
Start: 2025-01-09 | End: 2025-01-09 | Stop reason: HOSPADM

## 2025-01-09 RX ORDER — HYDRALAZINE HYDROCHLORIDE 20 MG/ML
5 INJECTION INTRAMUSCULAR; INTRAVENOUS ONCE AS NEEDED
Status: DISCONTINUED | OUTPATIENT
Start: 2025-01-09 | End: 2025-01-09 | Stop reason: HOSPADM

## 2025-01-09 RX ORDER — ONDANSETRON 4 MG/1
4 TABLET, FILM COATED ORAL EVERY 6 HOURS PRN
Status: DISCONTINUED | OUTPATIENT
Start: 2025-01-09 | End: 2025-01-09 | Stop reason: HOSPADM

## 2025-01-09 RX ORDER — LIDOCAINE HYDROCHLORIDE 10 MG/ML
0.5 INJECTION, SOLUTION EPIDURAL; INFILTRATION; INTRACAUDAL; PERINEURAL ONCE AS NEEDED
Status: DISCONTINUED | OUTPATIENT
Start: 2025-01-09 | End: 2025-01-09 | Stop reason: HOSPADM

## 2025-01-09 RX ORDER — ONDANSETRON HYDROCHLORIDE 2 MG/ML
4 INJECTION, SOLUTION INTRAVENOUS EVERY 6 HOURS PRN
Status: DISCONTINUED | OUTPATIENT
Start: 2025-01-09 | End: 2025-01-09 | Stop reason: HOSPADM

## 2025-01-09 RX ORDER — LABETALOL HYDROCHLORIDE 5 MG/ML
20 INJECTION, SOLUTION INTRAVENOUS ONCE AS NEEDED
Status: DISCONTINUED | OUTPATIENT
Start: 2025-01-09 | End: 2025-01-09 | Stop reason: HOSPADM

## 2025-01-09 SDOH — SOCIAL STABILITY: SOCIAL INSECURITY: HAVE YOU HAD ANY THOUGHTS OF HARMING ANYONE ELSE?: NO

## 2025-01-09 SDOH — SOCIAL STABILITY: SOCIAL INSECURITY: ARE THERE ANY APPARENT SIGNS OF INJURIES/BEHAVIORS THAT COULD BE RELATED TO ABUSE/NEGLECT?: NO

## 2025-01-09 SDOH — SOCIAL STABILITY: SOCIAL INSECURITY: HAS ANYONE EVER THREATENED TO HURT YOUR FAMILY OR YOUR PETS?: NO

## 2025-01-09 SDOH — SOCIAL STABILITY: SOCIAL INSECURITY: ABUSE SCREEN: ADULT

## 2025-01-09 SDOH — SOCIAL STABILITY: SOCIAL INSECURITY: HAVE YOU HAD THOUGHTS OF HARMING ANYONE ELSE?: NO

## 2025-01-09 SDOH — SOCIAL STABILITY: SOCIAL INSECURITY: DO YOU FEEL ANYONE HAS EXPLOITED OR TAKEN ADVANTAGE OF YOU FINANCIALLY OR OF YOUR PERSONAL PROPERTY?: NO

## 2025-01-09 SDOH — HEALTH STABILITY: MENTAL HEALTH: SUICIDAL BEHAVIOR (LIFETIME): NO

## 2025-01-09 SDOH — HEALTH STABILITY: MENTAL HEALTH: NON-SPECIFIC ACTIVE SUICIDAL THOUGHTS (PAST 1 MONTH): NO

## 2025-01-09 SDOH — HEALTH STABILITY: MENTAL HEALTH: WERE YOU ABLE TO COMPLETE ALL THE BEHAVIORAL HEALTH SCREENINGS?: YES

## 2025-01-09 SDOH — SOCIAL STABILITY: SOCIAL INSECURITY: VERBAL ABUSE: DENIES

## 2025-01-09 SDOH — SOCIAL STABILITY: SOCIAL INSECURITY: DOES ANYONE TRY TO KEEP YOU FROM HAVING/CONTACTING OTHER FRIENDS OR DOING THINGS OUTSIDE YOUR HOME?: NO

## 2025-01-09 SDOH — ECONOMIC STABILITY: HOUSING INSECURITY: DO YOU FEEL UNSAFE GOING BACK TO THE PLACE WHERE YOU ARE LIVING?: NO

## 2025-01-09 SDOH — SOCIAL STABILITY: SOCIAL INSECURITY: PHYSICAL ABUSE: DENIES

## 2025-01-09 SDOH — HEALTH STABILITY: MENTAL HEALTH: WISH TO BE DEAD (PAST 1 MONTH): NO

## 2025-01-09 SDOH — SOCIAL STABILITY: SOCIAL INSECURITY: ARE YOU OR HAVE YOU BEEN THREATENED OR ABUSED PHYSICALLY, EMOTIONALLY, OR SEXUALLY BY ANYONE?: NO

## 2025-01-09 ASSESSMENT — PATIENT HEALTH QUESTIONNAIRE - PHQ9
1. LITTLE INTEREST OR PLEASURE IN DOING THINGS: NOT AT ALL
SUM OF ALL RESPONSES TO PHQ9 QUESTIONS 1 & 2: 0
2. FEELING DOWN, DEPRESSED OR HOPELESS: NOT AT ALL

## 2025-01-09 ASSESSMENT — LIFESTYLE VARIABLES
AUDIT-C TOTAL SCORE: 0
HOW OFTEN DO YOU HAVE A DRINK CONTAINING ALCOHOL: NEVER
SKIP TO QUESTIONS 9-10: 1
HOW MANY STANDARD DRINKS CONTAINING ALCOHOL DO YOU HAVE ON A TYPICAL DAY: PATIENT DOES NOT DRINK
HOW OFTEN DO YOU HAVE 6 OR MORE DRINKS ON ONE OCCASION: NEVER
AUDIT-C TOTAL SCORE: 0

## 2025-01-09 ASSESSMENT — PAIN SCALES - GENERAL: PAINLEVEL_OUTOF10: 0 - NO PAIN

## 2025-01-09 NOTE — H&P
OB Triage H&P    Assessment/Plan    Vianney Miner is a 39 y.o.  at 34w5d, ABBEY: 2/15/2025, by Last Menstrual Period, who presents to triage with decreased fetal movement now resolved. Pt feeling good movement.    Plan    -Fetal monitoring reassuring  -Good fetal movement  -LASHAWN 11  -Kick counts, follow up Monday or sooner if movement concerns, signs of labor          Pregnancy Problems (from 24 to present)       Problem Noted Diagnosed Resolved    33 weeks gestation of pregnancy ([facility]) 2024 by Caryn Cooper MD  No    Priority:  Medium       Overview Addendum 1/3/2025  2:24 PM by Aislinn Reich MD     ** Previa RESOLVED on follow up anatomic USN  - Repeat USN 31.2 - EFW 54% with no evidence of previa     Desired provider in labor: [] CNM  [x] Physician  [x] Blood Products: [x] Yes, accepts [] No, needs counseling  [x] Initial BMI: 22.81   [x] Prenatal Labs:   [x] Cervical Cancer Screening up to date- 2022 - nml, negative HPV   [x] Rh status: Negative   [x] Genetic Screening:  RR NIPS - It's a GIRL   [x] NT US: (11-13 wks): WNL   [x] Baby ASA (if indicated): started 24  [x] Pregnancy dated by:  LMP c/w USN at 7 weeks     [x] Anatomy US: (19-20 wks): WNL, had normal fetal echo   [x] 1hr GCT at 24-28wks: elevated but normal 3 hour GTT   [x] Rhogam (if indicated): given    [] Fetal Surveillance (if indicated):  [x] Tdap: given    [x] RSV (32-36 wks): given /3  [x] Flu Vaccine: Given   [x] Updated COVID vaccine recommended     [x] Breastfeeding: has breast pump   [x] Postpartum Birth control method: partner planning vasectomy; may also elect for interval Mirena (endometriosis)   [] GBS at 36 - 37 wks:  [] 39 weeks discussion of IOL vs. Expectant management: considering IOL before , schedule next visit  [x] Mode of delivery ( anticipated ):          Encounter for supervision of high risk pregnancy due to advanced maternal age in primigravida 2024 by  Caryn Cooper MD  No    Priority:  Medium       Overview Addendum 2024  1:43 PM by Caryn Cooper MD     - RR NIPS  - Taking prophylactic ASA          Depression affecting pregnancy in first trimester, antepartum ([facility]) 2024 by Caryn Cooper MD  No    Priority:  Medium       Overview Addendum 2024  2:32 PM by Caryn Cooper MD     - on wellbutrin 150mg daily and zoloft 25mg daily   - managed by PCP          Complete placenta previa nos or without hemorrhage, unspecified trimester ([facility]) 10/2/2024 by Caryn Cooper MD  10/18/2024 by Caryn Cooper MD    Overview Signed 10/2/2024  5:04 PM by Caryn Cooper MD     <> f/u USN 28 weeks                  Subjective   40 yo  at 34.5 wk presents with decreased fetal movement.  She feels less movement today than normal.  Occasional cramping, no leakage of fluid or vaginal bleeding.    Prenatal Provider Allison    OB History    Para Term  AB Living   1 0 0 0 0 0   SAB IAB Ectopic Multiple Live Births   0 0 0 0 0      # Outcome Date GA Lbr Chava/2nd Weight Sex Type Anes PTL Lv   1 Current                Past Surgical History:   Procedure Laterality Date    CYSTOSCOPY      ENDOMETRIAL ABLATION  2024       Social History     Tobacco Use    Smoking status: Former     Current packs/day: 0.00     Average packs/day: 0.5 packs/day for 10.0 years (5.0 ttl pk-yrs)     Types: Cigarettes     Start date: 2011     Quit date: 2016     Years since quittin.5    Smokeless tobacco: Never   Substance Use Topics    Alcohol use: Never       Allergies   Allergen Reactions    Augmentin [Amoxicillin-Pot Clavulanate] Other and GI Upset     Gatrointestinal upset       Medications Prior to Admission   Medication Sig Dispense Refill Last Dose/Taking    aspirin 81 mg EC tablet Take 2 tablets (162 mg) by mouth once daily.   2025    buPROPion XL (Wellbutrin XL) 150 mg 24 hr tablet TAKE 1 TABLET (150  MG) BY MOUTH ONCE DAILY IN THE MORNING. DO NOT CRUSH, CHEW, OR SPLIT. 90 tablet 1 1/9/2025    ondansetron (Zofran) 8 mg tablet TAKE 1 TABLET (8 MG) BY MOUTH EVERY 8 HOURS IF NEEDED FOR NAUSEA. 20 tablet 2 Past Week    pantoprazole (Protonix) 40 mg EC tablet Take 1 tablet (40 mg) by mouth once daily in the morning. Take before meals. Do not crush, chew, or split. 30 tablet 11 1/9/2025    prenatal vit no.124/iron/folic (PRENATAL VITAMIN ORAL) Take 1 tablet by mouth once daily.   1/9/2025    sertraline (Zoloft) 50 mg tablet Take 1 tablet (50 mg) by mouth once daily. 30 tablet 11 1/8/2025    azelaic acid (Finacea) 15 % gel Apply to affected areas once daily 50 g 11 Unknown    clotrimazole-betamethasone (Lotrisone) cream Apply 1 Application topically 2 times a day. (Patient not taking: Reported on 12/18/2024) 45 g 0     ketoconazole (NIZOral) 2 % shampoo Apply to the affected areas once daily every other day. Let sit for 2 minutes, rinse thoroughly. 120 mL 11 Unknown    nitrofurantoin (Macrodantin) 100 mg capsule TAKE 1 CAPSULE BY MOUTH DAILY TAKE AFTER INTERCOURSE.   Unknown    sulfacetamide sodium-sulfur 9.8-4.8 % cleanser Apply to the affected areas once daily every other day. Let sit for 2 minutes, rinse thoroughly. 285 g 11 Unknown    terconazole (Terazol 7) 0.4 % vaginal cream Insert 1 applicator into the vagina once daily at bedtime. (Patient not taking: Reported on 12/3/2024) 45 g 0 Unknown    triamcinolone (Kenalog) 0.1 % ointment Apply topically once daily as needed (vulvar itching). (Patient not taking: Reported on 12/3/2024) 30 g 1 Unknown     Objective     Last Vitals  Temp Pulse Resp BP MAP O2 Sat     94   134/83 101 97 %     Blood Pressures         1/9/2025  1526 1/9/2025  1640          BP: 127/77 134/83               Physical Exam  General: NAD, mood appropriate  Cardiopulmonary: warm and well perfused, breathing comfortably on room air  Abdomen: Gravid, non-tender  Extremities: Symmetric       Fetal  Monitoring  Baseline: 150 bpm, Variability: moderate,  Accelerations: present and Decelerations: yes one mild variable  Uterine Activity: No contractions seen on toco  Interpretation: Reactive    Bedside ultrasound: Yes cephalic, LASHAWN 11, movement noted and patient feels movement during US

## 2025-01-13 ENCOUNTER — TELEPHONE (OUTPATIENT)
Dept: OBSTETRICS AND GYNECOLOGY | Facility: HOSPITAL | Age: 40
End: 2025-01-13

## 2025-01-13 ENCOUNTER — APPOINTMENT (OUTPATIENT)
Dept: OBSTETRICS AND GYNECOLOGY | Facility: CLINIC | Age: 40
End: 2025-01-13
Payer: COMMERCIAL

## 2025-01-13 ENCOUNTER — PREP FOR PROCEDURE (OUTPATIENT)
Dept: OBSTETRICS AND GYNECOLOGY | Facility: CLINIC | Age: 40
End: 2025-01-13

## 2025-01-13 VITALS — DIASTOLIC BLOOD PRESSURE: 79 MMHG | BODY MASS INDEX: 32.26 KG/M2 | WEIGHT: 212.2 LBS | SYSTOLIC BLOOD PRESSURE: 119 MMHG

## 2025-01-13 DIAGNOSIS — O09.519 ENCOUNTER FOR SUPERVISION OF HIGH RISK PREGNANCY DUE TO ADVANCED MATERNAL AGE IN PRIMIGRAVIDA: ICD-10-CM

## 2025-01-13 DIAGNOSIS — Z3A.35 35 WEEKS GESTATION OF PREGNANCY (HHS-HCC): Primary | ICD-10-CM

## 2025-01-13 DIAGNOSIS — F32.A DEPRESSION AFFECTING PREGNANCY IN FIRST TRIMESTER, ANTEPARTUM (HHS-HCC): ICD-10-CM

## 2025-01-13 DIAGNOSIS — O99.341 DEPRESSION AFFECTING PREGNANCY IN FIRST TRIMESTER, ANTEPARTUM (HHS-HCC): ICD-10-CM

## 2025-01-13 PROCEDURE — 0501F PRENATAL FLOW SHEET: CPT | Performed by: OBSTETRICS & GYNECOLOGY

## 2025-01-13 NOTE — TELEPHONE ENCOUNTER
Contacted patient regarding induction at [facility] on 2/11/25 @ 8pm. Patient verbalized understanding and has no additional questions or concerns at this time.   Advised that paperwork for induction will be emailed to the patient with in 24 hours.

## 2025-01-13 NOTE — PROGRESS NOTES
Patient presents for an OBFU  C/O: numbness in both hands, mostly right hand, comes and goes.    Terrence Daniels MA I    Routine prenatal visit     Subjective    HPI:  No complaints today.  Seen in triage for decreased fetal movement- has not been an issue since.  Normal FM. Discussed 39 week IOL vs expectant management - wants 39 week IOL - request submitted.  GBS next visit.     Objective    Vital Signs  /79   Wt 96.3 kg (212 lb 3.2 oz)   LMP 2024   BMI 32.26 kg/m²     Vianney Miner is a 39 y.o. yo  at 35w2d here for the following concerns which we addressed today:     Medical Problems       Problem List       Myofascial pain syndrome    Pelvic floor dysfunction    Overactive bladder    Glen's disease    Chronic pelvic pain in female    Depression    Interstitial cystitis    35 weeks gestation of pregnancy ([facility])    Overview Addendum 2025  5:24 PM by Caryn Cooper MD     ** Previa RESOLVED on follow up anatomic USN  - Repeat USN 31.2 - EFW 54% with no evidence of previa     Desired provider in labor: [] CNM  [x] Physician  [x] Blood Products: [x] Yes, accepts [] No, needs counseling  [x] Initial BMI: 22.81   [x] Prenatal Labs:   [x] Cervical Cancer Screening up to date- 2022 - nml, negative HPV   [x] Rh status: Negative   [x] Genetic Screening:  RR NIPS - It's a GIRL   [x] NT US: (11-13 wks): WNL   [x] Baby ASA (if indicated): started 24  [x] Pregnancy dated by:  LMP c/w USN at 7 weeks     [x] Anatomy US: (19-20 wks): WNL, had normal fetal echo   [x] 1hr GCT at 24-28wks: elevated but normal 3 hour GTT   [x] Rhogam (if indicated): given    [] Fetal Surveillance (if indicated):  [x] Tdap: given    [x] RSV (32-36 wks): given 1/3  [x] Flu Vaccine: Given   [x] Updated COVID vaccine recommended     [x] Breastfeeding: has breast pump   [x] Postpartum Birth control method: partner planning vasectomy; may also elect for interval Mirena (endometriosis)    [] GBS at 36 - 37 wks:  [x] 39 weeks discussion of IOL vs. Expectant management: IOL scheduled for  at 8pm   [x] Mode of delivery ( anticipated ):          Encounter for supervision of high risk pregnancy due to advanced maternal age in primigravida    Overview Addendum 2024  1:43 PM by Caryn Cooper MD     - RR NIPS  - Taking prophylactic ASA          Depression affecting pregnancy in first trimester, antepartum ([facility])    Overview Addendum 2024  2:32 PM by Caryn Cooper MD     - on wellbutrin 150mg daily and zoloft 25mg daily   - managed by PCP          Blood type, Rh negative        Follow up in 1 week(s).

## 2025-01-19 DIAGNOSIS — R11.2 NAUSEA AND VOMITING, UNSPECIFIED VOMITING TYPE: Primary | ICD-10-CM

## 2025-01-20 RX ORDER — ONDANSETRON 4 MG/1
4 TABLET, FILM COATED ORAL EVERY 8 HOURS PRN
Qty: 20 TABLET | Refills: 2 | Status: SHIPPED | OUTPATIENT
Start: 2025-01-20

## 2025-01-22 ENCOUNTER — APPOINTMENT (OUTPATIENT)
Dept: OBSTETRICS AND GYNECOLOGY | Facility: CLINIC | Age: 40
End: 2025-01-22
Payer: COMMERCIAL

## 2025-01-22 VITALS — DIASTOLIC BLOOD PRESSURE: 84 MMHG | WEIGHT: 215.8 LBS | SYSTOLIC BLOOD PRESSURE: 121 MMHG | BODY MASS INDEX: 32.81 KG/M2

## 2025-01-22 DIAGNOSIS — F32.A DEPRESSION AFFECTING PREGNANCY IN FIRST TRIMESTER, ANTEPARTUM (HHS-HCC): ICD-10-CM

## 2025-01-22 DIAGNOSIS — O09.519 ENCOUNTER FOR SUPERVISION OF HIGH RISK PREGNANCY DUE TO ADVANCED MATERNAL AGE IN PRIMIGRAVIDA: ICD-10-CM

## 2025-01-22 DIAGNOSIS — Z3A.36 36 WEEKS GESTATION OF PREGNANCY (HHS-HCC): Primary | ICD-10-CM

## 2025-01-22 DIAGNOSIS — O99.341 DEPRESSION AFFECTING PREGNANCY IN FIRST TRIMESTER, ANTEPARTUM (HHS-HCC): ICD-10-CM

## 2025-01-22 PROCEDURE — 0501F PRENATAL FLOW SHEET: CPT | Performed by: OBSTETRICS & GYNECOLOGY

## 2025-01-22 PROCEDURE — 87081 CULTURE SCREEN ONLY: CPT

## 2025-01-22 NOTE — PROGRESS NOTES
Patient presents for OBFU  GBS today - Augmentin allergy, sensitives ran   C/O: uncomfortable    Radha Toussaint MA II    Routine prenatal visit     Subjective    HPI:  Feeling well overall - no complaints.  GBS done today.  Not having contractions.      Objective    Vital Signs  /84   Wt 97.9 kg (215 lb 12.8 oz)   LMP 2024   BMI 32.81 kg/m²     Vianney Miner is a 39 y.o. yo  at 36w4d here for the following concerns which we addressed today:     Medical Problems       Problem List       Myofascial pain syndrome    Pelvic floor dysfunction    Overactive bladder    Glen's disease    Chronic pelvic pain in female    Depression    Interstitial cystitis    36 weeks gestation of pregnancy ([facility])    Overview Addendum 2025  3:49 PM by Caryn Cooper MD     ** Previa RESOLVED on follow up anatomic USN  - Repeat USN 31.2 - EFW 54% with no evidence of previa     Desired provider in labor: [] CNM  [x] Physician  [x] Blood Products: [x] Yes, accepts [] No, needs counseling  [x] Initial BMI: 22.81   [x] Prenatal Labs:   [x] Cervical Cancer Screening up to date- 2022 - nml, negative HPV   [x] Rh status: Negative   [x] Genetic Screening:  RR NIPS - It's a GIRL   [x] NT US: (11-13 wks): WNL   [x] Baby ASA (if indicated): started 24  [x] Pregnancy dated by:  LMP c/w USN at 7 weeks     [x] Anatomy US: (19-20 wks): WNL, had normal fetal echo   [x] 1hr GCT at 24-28wks: elevated but normal 3 hour GTT   [x] Rhogam (if indicated): given    [] Fetal Surveillance (if indicated):  [x] Tdap: given    [x] RSV (32-36 wks): given /3  [x] Flu Vaccine: Given   [x] Updated COVID vaccine recommended     [x] Breastfeeding: has breast pump   [x] Postpartum Birth control method: partner planning vasectomy; may also elect for interval Mirena (endometriosis)   [] GBS at 36 - 37 wks: collected    [x] 39 weeks discussion of IOL vs. Expectant management: IOL scheduled for  at 8pm   [x]  Mode of delivery ( anticipated ):          Encounter for supervision of high risk pregnancy due to advanced maternal age in primigravida    Overview Addendum 2024  1:43 PM by Caryn Cooper MD     - RR NIPS  - Taking prophylactic ASA          Depression affecting pregnancy in first trimester, antepartum ([facility])    Overview Addendum 2024  2:32 PM by Caryn Cooper MD     - on wellbutrin 150mg daily and zoloft 25mg daily   - managed by PCP          Blood type, Rh negative        Follow up in 1 week(s).

## 2025-01-25 LAB — GP B STREP GENITAL QL CULT: NORMAL

## 2025-01-28 ENCOUNTER — APPOINTMENT (OUTPATIENT)
Dept: OBSTETRICS AND GYNECOLOGY | Facility: CLINIC | Age: 40
End: 2025-01-28
Payer: COMMERCIAL

## 2025-01-28 VITALS — WEIGHT: 218.4 LBS | DIASTOLIC BLOOD PRESSURE: 84 MMHG | BODY MASS INDEX: 33.21 KG/M2 | SYSTOLIC BLOOD PRESSURE: 127 MMHG

## 2025-01-28 DIAGNOSIS — Z3A.37 37 WEEKS GESTATION OF PREGNANCY (HHS-HCC): Primary | ICD-10-CM

## 2025-01-28 DIAGNOSIS — Z67.91 BLOOD TYPE, RH NEGATIVE: ICD-10-CM

## 2025-01-28 DIAGNOSIS — O99.341 DEPRESSION AFFECTING PREGNANCY IN FIRST TRIMESTER, ANTEPARTUM (HHS-HCC): ICD-10-CM

## 2025-01-28 DIAGNOSIS — F32.A DEPRESSION AFFECTING PREGNANCY IN FIRST TRIMESTER, ANTEPARTUM (HHS-HCC): ICD-10-CM

## 2025-01-28 DIAGNOSIS — O09.519 ENCOUNTER FOR SUPERVISION OF HIGH RISK PREGNANCY DUE TO ADVANCED MATERNAL AGE IN PRIMIGRAVIDA: ICD-10-CM

## 2025-01-28 PROCEDURE — 0501F PRENATAL FLOW SHEET: CPT | Performed by: OBSTETRICS & GYNECOLOGY

## 2025-01-28 NOTE — PROGRESS NOTES
Patient presents for OBFU  GBS negative  C/O: uncomfortable, pressure, hand is numb.     Radha Toussaint MA II    Routine prenatal visit     Subjective    HPI:  Uncomfortable with pressure, etc.  Excited about last day of work which is next Tuesday.  Has increased swelling in calves and fingers.  BP WNL. Continue to monitor.  Has 39 week IOL scheduled.  Reviewed negative GBS today.     Objective    Vital Signs  /84   Wt 99.1 kg (218 lb 6.4 oz)   LMP 2024   BMI 33.21 kg/m²     Vianney Miner is a 39 y.o. yo  at 37w3d here for the following concerns which we addressed today:     Medical Problems       Problem List       Myofascial pain syndrome    Pelvic floor dysfunction    Overactive bladder    Glen's disease    Chronic pelvic pain in female    Depression    Interstitial cystitis    37 weeks gestation of pregnancy ([facility])    Overview Addendum 2025 10:23 AM by Caryn Cooper MD     ** Previa RESOLVED on follow up anatomic USN  - Repeat USN 31.2 - EFW 54% with no evidence of previa     Desired provider in labor: [] CNM  [x] Physician  [x] Blood Products: [x] Yes, accepts [] No, needs counseling  [x] Initial BMI: 22.81   [x] Prenatal Labs:   [x] Cervical Cancer Screening up to date- 2022 - nml, negative HPV   [x] Rh status: Negative   [x] Genetic Screening:  RR NIPS - It's a GIRL   [x] NT US: (11-13 wks): WNL   [x] Baby ASA (if indicated): started 24  [x] Pregnancy dated by:  LMP c/w USN at 7 weeks     [x] Anatomy US: (19-20 wks): WNL, had normal fetal echo   [x] 1hr GCT at 24-28wks: elevated but normal 3 hour GTT   [x] Rhogam (if indicated): given 11/18   [x] Tdap: given 11/18   [x] RSV (32-36 wks): given 1/3  [x] Flu Vaccine: Given /  [x] Updated COVID vaccine recommended     [x] Breastfeeding: has breast pump   [x] Postpartum Birth control method: partner planning vasectomy; may also elect for interval Mirena (endometriosis)   [x] GBS at 36 - 37 wks: negative   [x]  39 weeks discussion of IOL vs. Expectant management: IOL scheduled for  at 8pm   [x] Mode of delivery ( anticipated ):          Encounter for supervision of high risk pregnancy due to advanced maternal age in primigravida    Overview Addendum 2024  1:43 PM by Caryn Cooper MD     - RR NIPS  - Taking prophylactic ASA          Depression affecting pregnancy in first trimester, antepartum ([facility])    Overview Addendum 2024  2:32 PM by Caryn Cooper MD     - on wellbutrin 150mg daily and zoloft 25mg daily   - managed by PCP          Blood type, Rh negative        Follow up in 1 week(s).

## 2025-01-29 NOTE — PROGRESS NOTES
Urogynecology  Provider:  Randi Tidwell MD  [phone]      ASSESSMENT AND PLAN:   39 y.o. female being assessed for pelvic pain. She is currently 37 weeks pregnant. She is scheduled for an induction and vaginal delivery.     Plan:   1. Pelvic pain   - Use Tramadol sparingly as she is close to delivery. Call the office if she needs a refill.   - Recommend resting on the side to relieve pelvic pressure.     Follow-up in 3 months with Dr. Tidwell.     Scribe Attestation:   I, Sylwia Rojo, am scribing for virtually, and in the presence of Randi Tidwell MD on 01/30/2025 at 6:51 PM.     Phone call visit today: The patient's identity was confirmed and that she is located in [place]. Randi Tidwell MD identified herself and the patient consented to a telehealth visit today. Phone call time: 9 minutes    Agree with above. I Dr. Tidwell, personally performed the services described in the documentation which was scribed virtually and confirm it is both complete and accurate.  Randi Tidwell MD        Problem List Items Addressed This Visit    None          I spent a total of 14 minutes in face to face and non face to face time at this virtual visit.          Randi Tidwell MD        HISTORY OF PRESENT ILLNESS:   Vianney Miner is a 39 y.o. female who presents for pelvic pain.     Record Review:   Last visit 10/2024   Pelvic girdle pain, pain management  - Common in pregnancy.  - Recommended supportive measures such as pelvic support belts, physical therapy, or gentle exercise as tolerated.  - She is using tramadol sparingly (once every couple of weeks).  - Continue current pain management regimen and reassess if pain increasees.  - Reinforced the avoidance of NSAIDs like Motrin and recommended Tylenol as needed.  - She is due for a prescription check-in. Will email the necessary documents for her to sign and return.  - Benzo consent signed and returned on 10/28/24    Pelvic Symptoms:  - The patient  reports experiencing pressure and pain above the pubic bone, particularly when walking.  - She has been using a supportive belt, which provides some relief.  - She has been using Tramadol sparingly and has not taken it in 1-2 months.   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     Past Medical History:   Diagnosis Date    Abnormal Pap smear of cervix Not recently    Anxiety 2004    Depression     Endometriosis Surgery january 2024    HPV (human papilloma virus) infection No longer have it    Interstitial cystitis (chronic) without hematuria 11/12/2020    Cystitis, interstitial    Other specified disorders of nose and nasal sinuses 11/12/2020    Nasal hypertrophy    Personal history of diseases of the skin and subcutaneous tissue 11/12/2020    History of folliculitis    Substance abuse (Multi) Sober from alcohol since July 2016    Urinary tract infection 2005    Uterine anomaly           Past Surgical History:       Past Surgical History:   Procedure Laterality Date    CYSTOSCOPY      ENDOMETRIAL ABLATION  01/2024         Medications:       Prior to Admission medications    Medication Sig Start Date End Date Taking? Authorizing Provider   aspirin 81 mg EC tablet Take 2 tablets (162 mg) by mouth once daily. 9/4/24 9/4/25  Caryn Cooper MD   azelaic acid (Finacea) 15 % gel Apply to affected areas once daily 12/20/23   Radha Ji MD   buPROPion XL (Wellbutrin XL) 150 mg 24 hr tablet TAKE 1 TABLET (150 MG) BY MOUTH ONCE DAILY IN THE MORNING. DO NOT CRUSH, CHEW, OR SPLIT. 11/26/24 5/25/25  Josephine Kapoor,    nitrofurantoin (Macrodantin) 100 mg capsule TAKE 1 CAPSULE BY MOUTH DAILY TAKE AFTER INTERCOURSE.    Historical Provider, MD   ondansetron (Zofran) 4 mg tablet TAKE 1 TABLET (4 MG) BY MOUTH EVERY 8 HOURS IF NEEDED FOR NAUSEA OR VOMITING. 1/20/25    Caryn Cooper MD   ondansetron (Zofran) 8 mg tablet TAKE 1 TABLET (8 MG) BY MOUTH EVERY 8 HOURS IF NEEDED FOR NAUSEA. 11/8/24   Caryn Cooper MD   pantoprazole (Protonix) 40 mg EC tablet Take 1 tablet (40 mg) by mouth once daily in the morning. Take before meals. Do not crush, chew, or split. 12/18/24 12/18/25  Caryn Cooper MD   prenatal vit no.124/iron/folic (PRENATAL VITAMIN ORAL) Take 1 tablet by mouth once daily.    Historical Provider, MD   sertraline (Zoloft) 50 mg tablet Take 1 tablet (50 mg) by mouth once daily. 7/8/24 7/8/25  Josephine Kapoor,    sulfacetamide sodium-sulfur 9.8-4.8 % cleanser Apply to the affected areas once daily every other day. Let sit for 2 minutes, rinse thoroughly. 12/20/23   Radha Ji MD        ROS  Review of Systems       Data and DIAGNOSTIC STUDIES REVIEWED   No results found.   Lab Results   Component Value Date    URINECULTURE Normal genitourinary bal 11/19/2024      Lab Results   Component Value Date    GLUCOSE 78 09/01/2024    CALCIUM 8.6 09/01/2024     (L) 09/01/2024    K 3.5 09/01/2024    CO2 23 09/01/2024     09/01/2024    BUN 10 09/01/2024    CREATININE 0.51 09/01/2024     Lab Results   Component Value Date    WBC 11.4 (H) 10/30/2024    HGB 11.7 (L) 10/30/2024    HCT 37.2 10/30/2024    MCV 98 10/30/2024     10/30/2024

## 2025-01-30 ENCOUNTER — TELEMEDICINE (OUTPATIENT)
Dept: OBSTETRICS AND GYNECOLOGY | Facility: CLINIC | Age: 40
End: 2025-01-30
Payer: COMMERCIAL

## 2025-01-30 DIAGNOSIS — M79.18 MYOFASCIAL PAIN SYNDROME: ICD-10-CM

## 2025-01-30 DIAGNOSIS — M62.89 PELVIC FLOOR DYSFUNCTION: Primary | ICD-10-CM

## 2025-01-30 PROCEDURE — 99212 OFFICE O/P EST SF 10 MIN: CPT | Performed by: OBSTETRICS & GYNECOLOGY

## 2025-01-31 ENCOUNTER — OFFICE VISIT (OUTPATIENT)
Dept: PRIMARY CARE | Facility: CLINIC | Age: 40
End: 2025-01-31
Payer: COMMERCIAL

## 2025-01-31 ENCOUNTER — TELEPHONE (OUTPATIENT)
Dept: OBSTETRICS AND GYNECOLOGY | Facility: CLINIC | Age: 40
End: 2025-01-31
Payer: COMMERCIAL

## 2025-01-31 VITALS
OXYGEN SATURATION: 99 % | SYSTOLIC BLOOD PRESSURE: 132 MMHG | WEIGHT: 219 LBS | RESPIRATION RATE: 18 BRPM | DIASTOLIC BLOOD PRESSURE: 86 MMHG | BODY MASS INDEX: 33.3 KG/M2 | HEART RATE: 98 BPM | TEMPERATURE: 98.5 F

## 2025-01-31 DIAGNOSIS — J01.40 ACUTE NON-RECURRENT PANSINUSITIS: Primary | ICD-10-CM

## 2025-01-31 PROCEDURE — 99213 OFFICE O/P EST LOW 20 MIN: CPT | Performed by: NURSE PRACTITIONER

## 2025-01-31 PROCEDURE — 1036F TOBACCO NON-USER: CPT | Performed by: NURSE PRACTITIONER

## 2025-01-31 RX ORDER — AMOXICILLIN 500 MG/1
500 CAPSULE ORAL EVERY 12 HOURS SCHEDULED
Qty: 14 CAPSULE | Refills: 0 | Status: SHIPPED | OUTPATIENT
Start: 2025-01-31 | End: 2025-02-07

## 2025-01-31 ASSESSMENT — ENCOUNTER SYMPTOMS
NAUSEA: 0
SINUS COMPLAINT: 1
RHINORRHEA: 1
COUGH: 0
FEVER: 0
SLEEP DISTURBANCE: 0
ACTIVITY CHANGE: 0
APPETITE CHANGE: 0
SORE THROAT: 0
SINUS PRESSURE: 1
HEADACHES: 1
CONSTIPATION: 0
VOMITING: 0
DIARRHEA: 0

## 2025-01-31 NOTE — PROGRESS NOTES
Subjective   Patient ID: Vianney Miner is a 39 y.o. female who presents for Sinus Problem.    PT is pregnant and due in 10 days.    Cold symptoms x1 month  Ear pain  Facial pressure; getting worse  Nasal congestion  Nasal drainage  Eating and drinking fine  No GI issues  No fever or chills      OTC- netti pot    Sinus Problem  Episode onset: 1 month. Associated symptoms include congestion, ear pain, headaches and sinus pressure. Pertinent negatives include no coughing or sore throat.        Review of Systems   Constitutional:  Negative for activity change, appetite change and fever.   HENT:  Positive for congestion, ear pain, postnasal drip, rhinorrhea and sinus pressure. Negative for sore throat.    Respiratory:  Negative for cough.    Gastrointestinal:  Negative for constipation, diarrhea, nausea and vomiting.   Neurological:  Positive for headaches.   Psychiatric/Behavioral:  Negative for sleep disturbance.        Objective   /86   Pulse 98   Temp 36.9 °C (98.5 °F)   Resp 18   Wt 99.3 kg (219 lb)   LMP 05/11/2024   SpO2 99%   BMI 33.30 kg/m²     Physical Exam  Vitals reviewed.   Constitutional:       General: She is not in acute distress.     Appearance: Normal appearance. She is not ill-appearing or toxic-appearing.   HENT:      Head: Normocephalic.      Right Ear: Tympanic membrane, ear canal and external ear normal.      Left Ear: Tympanic membrane, ear canal and external ear normal.      Nose: Mucosal edema, congestion and rhinorrhea present. Rhinorrhea is clear.      Right Turbinates: Swollen.      Left Turbinates: Swollen.      Mouth/Throat:      Lips: Pink.      Mouth: Mucous membranes are moist.      Pharynx: Posterior oropharyngeal erythema and postnasal drip present.   Eyes:      Extraocular Movements: Extraocular movements intact.      Conjunctiva/sclera: Conjunctivae normal.      Pupils: Pupils are equal, round, and reactive to light.   Cardiovascular:      Rate and Rhythm: Normal  rate and regular rhythm.      Pulses: Normal pulses.      Heart sounds: Normal heart sounds.   Pulmonary:      Effort: Pulmonary effort is normal.      Breath sounds: Normal breath sounds.   Musculoskeletal:      Cervical back: Normal range of motion and neck supple.   Skin:     General: Skin is warm.      Capillary Refill: Capillary refill takes less than 2 seconds.   Neurological:      General: No focal deficit present.      Mental Status: She is alert and oriented to person, place, and time.   Psychiatric:         Mood and Affect: Mood normal.         Behavior: Behavior normal.     Assessment/Plan   Diagnoses and all orders for this visit:  Acute non-recurrent pansinusitis  -     amoxicillin (Amoxil) 500 mg capsule; Take 1 capsule (500 mg) by mouth every 12 hours for 7 days.    Declines flu/Covid testing  Antibiotics started for acute sinusitis; Take full course until completed  Can use nasal saline for congestion  Increase hydration  Follow up with PCP if not improving over the next several days  ER for any SOB, difficulty breathing, uncontrolled fevers or worsening of symptoms

## 2025-01-31 NOTE — TELEPHONE ENCOUNTER
Vianney called and said that she has a sinus infection. She reached out to her primary care, who told her to go to the walk in clinic as they did not have any appointments for today. She asked if antibiotics are safe in pregnancy as she is 37 weeks and 6 days pregnant. I spoke with her and let her know that antibiotics are safe in pregnancy. She did not have any other questions at this time.

## 2025-02-03 ENCOUNTER — TELEPHONE (OUTPATIENT)
Dept: OBSTETRICS AND GYNECOLOGY | Facility: CLINIC | Age: 40
End: 2025-02-03
Payer: COMMERCIAL

## 2025-02-03 NOTE — TELEPHONE ENCOUNTER
38.2 wk ob called ob line with several concerns.  She has been having irregular lovely griffin contractions all morning, not painful just uncomfortable.  Denies spotting, bleeding or leaking of fluid.  + FM.  Patient has also noticed and increase in swelling in her feet.  As she was leaning over to put her compression stocking on she felt super light headed and had to lay down.  Right now she is laying down with her feet elevated, working on pushing fluids.  She was eating [place] food prior to feeling lightheaded as well.  Denies headache or RUQ pain.  Took BP at home and per patient it was normal.    Patient encouraged to keep pushing fluids for hydration.  Its possible that her blood sugar could have been low causing her to feel lightheaded or it could be due to baby's position.  Patient advised to drink some cold juice, smoothie or even eat some ice cream to see if she is able to get baby to move in a different position to see if her lightheadedness subsides.  Patient to call back if no change.    Patient advised to call back with contractions that are taking her breath away every 5 minutes, lasting 1 minute for 1 full hour; Bleeding like a period or LOF.  Or with any other concerns.    Keep next obfu on 2/5 as scheduled with Dr Cooper    Metronidazole Counseling:  I discussed with the patient the risks of metronidazole including but not limited to seizures, nausea/vomiting, a metallic taste in the mouth, nausea/vomiting and severe allergy.

## 2025-02-05 ENCOUNTER — APPOINTMENT (OUTPATIENT)
Dept: OBSTETRICS AND GYNECOLOGY | Facility: CLINIC | Age: 40
End: 2025-02-05
Payer: COMMERCIAL

## 2025-02-05 VITALS — SYSTOLIC BLOOD PRESSURE: 124 MMHG | DIASTOLIC BLOOD PRESSURE: 82 MMHG | BODY MASS INDEX: 33.3 KG/M2 | WEIGHT: 219 LBS

## 2025-02-05 DIAGNOSIS — O99.341 DEPRESSION AFFECTING PREGNANCY IN FIRST TRIMESTER, ANTEPARTUM (HHS-HCC): ICD-10-CM

## 2025-02-05 DIAGNOSIS — F32.A DEPRESSION AFFECTING PREGNANCY IN FIRST TRIMESTER, ANTEPARTUM (HHS-HCC): ICD-10-CM

## 2025-02-05 DIAGNOSIS — O09.519 ENCOUNTER FOR SUPERVISION OF HIGH RISK PREGNANCY DUE TO ADVANCED MATERNAL AGE IN PRIMIGRAVIDA: ICD-10-CM

## 2025-02-05 DIAGNOSIS — Z3A.37 37 WEEKS GESTATION OF PREGNANCY (HHS-HCC): Primary | ICD-10-CM

## 2025-02-05 PROCEDURE — 0501F PRENATAL FLOW SHEET: CPT | Performed by: OBSTETRICS & GYNECOLOGY

## 2025-02-05 NOTE — PROGRESS NOTES
Patient presents for OBFU  C/O: Questions about using dried eucalyptus in the shower     Radha Toussaint MA II    Routine prenatal visit     Subjective    HPI:  Had cramping throughout the day 2 days ago.  Better yesterday.  FM still normal.  Cervix today /-2. Has IOL scheduled next week if undelivered.     Objective    Vital Signs  /82   Wt 99.3 kg (219 lb)   LMP 2024   BMI 33.30 kg/m²     Vianney Miner is a 39 y.o. yo  at 38w4d here for the following concerns which we addressed today:     Medical Problems       Problem List       Myofascial pain syndrome    Pelvic floor dysfunction    Overactive bladder    Glen's disease    Chronic pelvic pain in female    Depression    Interstitial cystitis    37 weeks gestation of pregnancy ([facility])    Overview Addendum 2025 10:23 AM by Caryn Cooper MD     ** Previa RESOLVED on follow up anatomic USN  - Repeat USN 31.2 - EFW 54% with no evidence of previa     Desired provider in labor: [] CNM  [x] Physician  [x] Blood Products: [x] Yes, accepts [] No, needs counseling  [x] Initial BMI: 22.81   [x] Prenatal Labs:   [x] Cervical Cancer Screening up to date- 2022 - nml, negative HPV   [x] Rh status: Negative   [x] Genetic Screening:  RR NIPS - It's a GIRL   [x] NT US: (11-13 wks): WNL   [x] Baby ASA (if indicated): started 24  [x] Pregnancy dated by:  LMP c/w USN at 7 weeks     [x] Anatomy US: (19-20 wks): WNL, had normal fetal echo   [x] 1hr GCT at 24-28wks: elevated but normal 3 hour GTT   [x] Rhogam (if indicated): given 11/18   [x] Tdap: given 11/18   [x] RSV (32-36 wks): given 1/3  [x] Flu Vaccine: Given   [x] Updated COVID vaccine recommended     [x] Breastfeeding: has breast pump   [x] Postpartum Birth control method: partner planning vasectomy; may also elect for interval Mirena (endometriosis)   [x] GBS at 36 - 37 wks: negative   [x] 39 weeks discussion of IOL vs. Expectant management: IOL scheduled for  at 8pm    [x] Mode of delivery ( anticipated ):          Encounter for supervision of high risk pregnancy due to advanced maternal age in primigravida    Overview Addendum 2024  1:43 PM by Caryn Cooper MD     - RR NIPS  - Taking prophylactic ASA          Depression affecting pregnancy in first trimester, antepartum ([facility])    Overview Addendum 2024  2:32 PM by Caryn Cooper MD     - on wellbutrin 150mg daily and zoloft 25mg daily   - managed by PCP          Blood type, Rh negative    Overview Signed 2025 10:36 AM by Caryn Cooper MD     Rhogam given               Follow up in 1 week(s).

## 2025-02-10 ENCOUNTER — ANESTHESIA EVENT (OUTPATIENT)
Dept: OBSTETRICS AND GYNECOLOGY | Facility: HOSPITAL | Age: 40
End: 2025-02-10
Payer: COMMERCIAL

## 2025-02-10 ENCOUNTER — ANESTHESIA (OUTPATIENT)
Dept: OBSTETRICS AND GYNECOLOGY | Facility: HOSPITAL | Age: 40
End: 2025-02-10
Payer: COMMERCIAL

## 2025-02-10 ENCOUNTER — HOSPITAL ENCOUNTER (INPATIENT)
Facility: HOSPITAL | Age: 40
LOS: 4 days | Discharge: HOME | End: 2025-02-14
Attending: OBSTETRICS & GYNECOLOGY | Admitting: OBSTETRICS & GYNECOLOGY
Payer: COMMERCIAL

## 2025-02-10 DIAGNOSIS — O13.3 GESTATIONAL HTN, THIRD TRIMESTER (HHS-HCC): ICD-10-CM

## 2025-02-10 DIAGNOSIS — R52 POSTPARTUM PAIN (HHS-HCC): Primary | ICD-10-CM

## 2025-02-10 PROBLEM — M41.9 SCOLIOSIS OF LUMBAR SPINE: Status: ACTIVE | Noted: 2025-02-10

## 2025-02-10 PROBLEM — M06.9 RHEUMATOID ARTHRITIS: Status: ACTIVE | Noted: 2025-02-10

## 2025-02-10 LAB
ABO GROUP (TYPE) IN BLOOD: NORMAL
ALBUMIN SERPL BCP-MCNC: 3.4 G/DL (ref 3.4–5)
ALP SERPL-CCNC: 100 U/L (ref 33–110)
ALT SERPL W P-5'-P-CCNC: 13 U/L (ref 7–45)
ANION GAP SERPL CALC-SCNC: 13 MMOL/L (ref 10–20)
ANTIBODY SCREEN: NORMAL
AST SERPL W P-5'-P-CCNC: 15 U/L (ref 9–39)
BILIRUB SERPL-MCNC: 0.3 MG/DL (ref 0–1.2)
BUN SERPL-MCNC: 13 MG/DL (ref 6–23)
CALCIUM SERPL-MCNC: 8.5 MG/DL (ref 8.6–10.3)
CHLORIDE SERPL-SCNC: 106 MMOL/L (ref 98–107)
CO2 SERPL-SCNC: 20 MMOL/L (ref 21–32)
CREAT SERPL-MCNC: 0.46 MG/DL (ref 0.5–1.05)
CREAT UR-MCNC: 76.3 MG/DL (ref 20–320)
EGFRCR SERPLBLD CKD-EPI 2021: >90 ML/MIN/1.73M*2
ERYTHROCYTE [DISTWIDTH] IN BLOOD BY AUTOMATED COUNT: 15.6 % (ref 11.5–14.5)
GLUCOSE SERPL-MCNC: 106 MG/DL (ref 74–99)
HCT VFR BLD AUTO: 31.9 % (ref 36–46)
HGB BLD-MCNC: 9.4 G/DL (ref 12–16)
MCH RBC QN AUTO: 25.6 PG (ref 26–34)
MCHC RBC AUTO-ENTMCNC: 29.5 G/DL (ref 32–36)
MCV RBC AUTO: 87 FL (ref 80–100)
NRBC BLD-RTO: 0.2 /100 WBCS (ref 0–0)
PLATELET # BLD AUTO: 389 X10*3/UL (ref 150–450)
POTASSIUM SERPL-SCNC: 4 MMOL/L (ref 3.5–5.3)
PROT SERPL-MCNC: 6 G/DL (ref 6.4–8.2)
PROT UR-ACNC: 20 MG/DL (ref 5–24)
PROT/CREAT UR: 0.26 MG/MG CREAT (ref 0–0.17)
RBC # BLD AUTO: 3.67 X10*6/UL (ref 4–5.2)
RH FACTOR (ANTIGEN D): NORMAL
SODIUM SERPL-SCNC: 135 MMOL/L (ref 136–145)
TREPONEMA PALLIDUM IGG+IGM AB [PRESENCE] IN SERUM OR PLASMA BY IMMUNOASSAY: NONREACTIVE
WBC # BLD AUTO: 12.7 X10*3/UL (ref 4.4–11.3)

## 2025-02-10 PROCEDURE — 2500000004 HC RX 250 GENERAL PHARMACY W/ HCPCS (ALT 636 FOR OP/ED): Performed by: OBSTETRICS & GYNECOLOGY

## 2025-02-10 PROCEDURE — 3E0P7GC INTRODUCTION OF OTHER THERAPEUTIC SUBSTANCE INTO FEMALE REPRODUCTIVE, VIA NATURAL OR ARTIFICIAL OPENING: ICD-10-PCS | Performed by: OBSTETRICS & GYNECOLOGY

## 2025-02-10 PROCEDURE — 86780 TREPONEMA PALLIDUM: CPT | Mod: STJLAB | Performed by: OBSTETRICS & GYNECOLOGY

## 2025-02-10 PROCEDURE — 3700000014 EPIDURAL BLOCK: Performed by: NURSE ANESTHETIST, CERTIFIED REGISTERED

## 2025-02-10 PROCEDURE — 7210000002 HC LABOR PER HOUR

## 2025-02-10 PROCEDURE — 86922 COMPATIBILITY TEST ANTIGLOB: CPT

## 2025-02-10 PROCEDURE — 59050 FETAL MONITOR W/REPORT: CPT

## 2025-02-10 PROCEDURE — 2500000001 HC RX 250 WO HCPCS SELF ADMINISTERED DRUGS (ALT 637 FOR MEDICARE OP): Performed by: OBSTETRICS & GYNECOLOGY

## 2025-02-10 PROCEDURE — 36415 COLL VENOUS BLD VENIPUNCTURE: CPT | Performed by: OBSTETRICS & GYNECOLOGY

## 2025-02-10 PROCEDURE — 2720000007 HC OR 272 NO HCPCS

## 2025-02-10 PROCEDURE — 85027 COMPLETE CBC AUTOMATED: CPT | Performed by: OBSTETRICS & GYNECOLOGY

## 2025-02-10 PROCEDURE — 86901 BLOOD TYPING SEROLOGIC RH(D): CPT | Performed by: OBSTETRICS & GYNECOLOGY

## 2025-02-10 PROCEDURE — 80053 COMPREHEN METABOLIC PANEL: CPT | Performed by: OBSTETRICS & GYNECOLOGY

## 2025-02-10 PROCEDURE — C1725 CATH, TRANSLUMIN NON-LASER: HCPCS

## 2025-02-10 PROCEDURE — 2500000004 HC RX 250 GENERAL PHARMACY W/ HCPCS (ALT 636 FOR OP/ED): Performed by: NURSE ANESTHETIST, CERTIFIED REGISTERED

## 2025-02-10 PROCEDURE — 82570 ASSAY OF URINE CREATININE: CPT | Performed by: OBSTETRICS & GYNECOLOGY

## 2025-02-10 PROCEDURE — 1120000001 HC OB PRIVATE ROOM DAILY

## 2025-02-10 RX ORDER — LOPERAMIDE HYDROCHLORIDE 2 MG/1
4 CAPSULE ORAL EVERY 2 HOUR PRN
Status: DISCONTINUED | OUTPATIENT
Start: 2025-02-10 | End: 2025-02-11

## 2025-02-10 RX ORDER — TERBUTALINE SULFATE 1 MG/ML
0.25 INJECTION SUBCUTANEOUS ONCE AS NEEDED
Status: DISCONTINUED | OUTPATIENT
Start: 2025-02-10 | End: 2025-02-11

## 2025-02-10 RX ORDER — OXYTOCIN/0.9 % SODIUM CHLORIDE 30/500 ML
2-30 PLASTIC BAG, INJECTION (ML) INTRAVENOUS CONTINUOUS
Status: DISCONTINUED | OUTPATIENT
Start: 2025-02-10 | End: 2025-02-11

## 2025-02-10 RX ORDER — LIDOCAINE HYDROCHLORIDE 10 MG/ML
30 INJECTION, SOLUTION INFILTRATION; PERINEURAL ONCE AS NEEDED
Status: DISCONTINUED | OUTPATIENT
Start: 2025-02-10 | End: 2025-02-11

## 2025-02-10 RX ORDER — TRANEXAMIC ACID 100 MG/ML
1000 INJECTION, SOLUTION INTRAVENOUS ONCE AS NEEDED
Status: DISCONTINUED | OUTPATIENT
Start: 2025-02-10 | End: 2025-02-11

## 2025-02-10 RX ORDER — ACETAMINOPHEN 325 MG/1
975 TABLET ORAL ONCE
Status: COMPLETED | OUTPATIENT
Start: 2025-02-10 | End: 2025-02-10

## 2025-02-10 RX ORDER — MISOPROSTOL 200 UG/1
800 TABLET ORAL ONCE AS NEEDED
Status: COMPLETED | OUTPATIENT
Start: 2025-02-10 | End: 2025-02-11

## 2025-02-10 RX ORDER — CARBOPROST TROMETHAMINE 250 UG/ML
250 INJECTION, SOLUTION INTRAMUSCULAR ONCE AS NEEDED
Status: DISCONTINUED | OUTPATIENT
Start: 2025-02-10 | End: 2025-02-11

## 2025-02-10 RX ORDER — HYDRALAZINE HYDROCHLORIDE 20 MG/ML
5 INJECTION INTRAMUSCULAR; INTRAVENOUS ONCE AS NEEDED
Status: DISCONTINUED | OUTPATIENT
Start: 2025-02-10 | End: 2025-02-11

## 2025-02-10 RX ORDER — ONDANSETRON 4 MG/1
4 TABLET, FILM COATED ORAL EVERY 6 HOURS PRN
Status: DISCONTINUED | OUTPATIENT
Start: 2025-02-10 | End: 2025-02-11

## 2025-02-10 RX ORDER — OXYTOCIN/0.9 % SODIUM CHLORIDE 30/500 ML
60 PLASTIC BAG, INJECTION (ML) INTRAVENOUS ONCE AS NEEDED
Status: DISCONTINUED | OUTPATIENT
Start: 2025-02-10 | End: 2025-02-11

## 2025-02-10 RX ORDER — OXYTOCIN 10 [USP'U]/ML
10 INJECTION, SOLUTION INTRAMUSCULAR; INTRAVENOUS ONCE AS NEEDED
Status: DISCONTINUED | OUTPATIENT
Start: 2025-02-10 | End: 2025-02-11

## 2025-02-10 RX ORDER — PHENYLEPHRINE HCL IN 0.9% NACL 1 MG/10 ML
SYRINGE (ML) INTRAVENOUS AS NEEDED
Status: DISCONTINUED | OUTPATIENT
Start: 2025-02-10 | End: 2025-02-11

## 2025-02-10 RX ORDER — FENTANYL/ROPIVACAINE/NS/PF 2MCG/ML-.2
0-25 PLASTIC BAG, INJECTION (ML) INJECTION CONTINUOUS
Status: DISCONTINUED | OUTPATIENT
Start: 2025-02-10 | End: 2025-02-11

## 2025-02-10 RX ORDER — METHYLERGONOVINE MALEATE 0.2 MG/ML
0.2 INJECTION INTRAVENOUS ONCE AS NEEDED
Status: DISCONTINUED | OUTPATIENT
Start: 2025-02-10 | End: 2025-02-11

## 2025-02-10 RX ORDER — NIFEDIPINE 10 MG/1
10 CAPSULE ORAL ONCE AS NEEDED
Status: DISCONTINUED | OUTPATIENT
Start: 2025-02-10 | End: 2025-02-11

## 2025-02-10 RX ORDER — LABETALOL HYDROCHLORIDE 5 MG/ML
20 INJECTION, SOLUTION INTRAVENOUS ONCE AS NEEDED
Status: COMPLETED | OUTPATIENT
Start: 2025-02-10 | End: 2025-02-11

## 2025-02-10 RX ORDER — SERTRALINE HYDROCHLORIDE 25 MG/1
25 TABLET, FILM COATED ORAL DAILY
Status: DISCONTINUED | OUTPATIENT
Start: 2025-02-10 | End: 2025-02-14 | Stop reason: HOSPADM

## 2025-02-10 RX ORDER — SODIUM CHLORIDE, SODIUM LACTATE, POTASSIUM CHLORIDE, CALCIUM CHLORIDE 600; 310; 30; 20 MG/100ML; MG/100ML; MG/100ML; MG/100ML
125 INJECTION, SOLUTION INTRAVENOUS CONTINUOUS
Status: DISCONTINUED | OUTPATIENT
Start: 2025-02-10 | End: 2025-02-11

## 2025-02-10 RX ORDER — ONDANSETRON HYDROCHLORIDE 2 MG/ML
4 INJECTION, SOLUTION INTRAVENOUS EVERY 6 HOURS PRN
Status: DISCONTINUED | OUTPATIENT
Start: 2025-02-10 | End: 2025-02-11

## 2025-02-10 RX ORDER — NALBUPHINE HYDROCHLORIDE 10 MG/ML
10 INJECTION INTRAMUSCULAR; INTRAVENOUS; SUBCUTANEOUS ONCE
Status: COMPLETED | OUTPATIENT
Start: 2025-02-10 | End: 2025-02-10

## 2025-02-10 RX ADMIN — MISOPROSTOL 25 MCG: 100 TABLET ORAL at 17:01

## 2025-02-10 RX ADMIN — Medication 100 MCG: at 23:55

## 2025-02-10 RX ADMIN — NALBUPHINE HYDROCHLORIDE 10 MG: 10 INJECTION, SOLUTION INTRAMUSCULAR; INTRAVENOUS; SUBCUTANEOUS at 18:54

## 2025-02-10 RX ADMIN — MISOPROSTOL 25 MCG: 100 TABLET ORAL at 13:21

## 2025-02-10 RX ADMIN — SODIUM CHLORIDE, POTASSIUM CHLORIDE, SODIUM LACTATE AND CALCIUM CHLORIDE 500 ML: 600; 310; 30; 20 INJECTION, SOLUTION INTRAVENOUS at 23:58

## 2025-02-10 RX ADMIN — Medication 8 ML/HR: at 23:33

## 2025-02-10 RX ADMIN — Medication 50 MCG: at 23:53

## 2025-02-10 RX ADMIN — Medication 100 MCG: at 23:57

## 2025-02-10 RX ADMIN — SODIUM CHLORIDE, POTASSIUM CHLORIDE, SODIUM LACTATE AND CALCIUM CHLORIDE 500 ML: 600; 310; 30; 20 INJECTION, SOLUTION INTRAVENOUS at 22:33

## 2025-02-10 RX ADMIN — ACETAMINOPHEN 975 MG: 325 TABLET ORAL at 17:39

## 2025-02-10 RX ADMIN — SODIUM CHLORIDE, POTASSIUM CHLORIDE, SODIUM LACTATE AND CALCIUM CHLORIDE 125 ML/HR: 600; 310; 30; 20 INJECTION, SOLUTION INTRAVENOUS at 21:56

## 2025-02-10 RX ADMIN — Medication 2 MILLI-UNITS/MIN: at 22:01

## 2025-02-10 SDOH — HEALTH STABILITY: MENTAL HEALTH: HAVE YOU USED ANY PRESCRIPTION DRUGS OTHER THAN PRESCRIBED IN THE PAST 12 MONTHS?: NO

## 2025-02-10 SDOH — SOCIAL STABILITY: SOCIAL INSECURITY: WITHIN THE LAST YEAR, HAVE YOU BEEN HUMILIATED OR EMOTIONALLY ABUSED IN OTHER WAYS BY YOUR PARTNER OR EX-PARTNER?: NO

## 2025-02-10 SDOH — HEALTH STABILITY: MENTAL HEALTH: CURRENT SMOKER: 0

## 2025-02-10 SDOH — ECONOMIC STABILITY: FOOD INSECURITY: WITHIN THE PAST 12 MONTHS, YOU WORRIED THAT YOUR FOOD WOULD RUN OUT BEFORE YOU GOT THE MONEY TO BUY MORE.: NEVER TRUE

## 2025-02-10 SDOH — SOCIAL STABILITY: SOCIAL INSECURITY: PHYSICAL ABUSE: DENIES

## 2025-02-10 SDOH — SOCIAL STABILITY: SOCIAL INSECURITY: VERBAL ABUSE: DENIES

## 2025-02-10 SDOH — SOCIAL STABILITY: SOCIAL INSECURITY: DOES ANYONE TRY TO KEEP YOU FROM HAVING/CONTACTING OTHER FRIENDS OR DOING THINGS OUTSIDE YOUR HOME?: NO

## 2025-02-10 SDOH — ECONOMIC STABILITY: FOOD INSECURITY: WITHIN THE PAST 12 MONTHS, THE FOOD YOU BOUGHT JUST DIDN'T LAST AND YOU DIDN'T HAVE MONEY TO GET MORE.: NEVER TRUE

## 2025-02-10 SDOH — ECONOMIC STABILITY: HOUSING INSECURITY: DO YOU FEEL UNSAFE GOING BACK TO THE PLACE WHERE YOU ARE LIVING?: NO

## 2025-02-10 SDOH — SOCIAL STABILITY: SOCIAL INSECURITY
WITHIN THE LAST YEAR, HAVE YOU BEEN RAPED OR FORCED TO HAVE ANY KIND OF SEXUAL ACTIVITY BY YOUR PARTNER OR EX-PARTNER?: NO

## 2025-02-10 SDOH — SOCIAL STABILITY: SOCIAL INSECURITY: ABUSE SCREEN: ADULT

## 2025-02-10 SDOH — HEALTH STABILITY: MENTAL HEALTH: WERE YOU ABLE TO COMPLETE ALL THE BEHAVIORAL HEALTH SCREENINGS?: YES

## 2025-02-10 SDOH — SOCIAL STABILITY: SOCIAL INSECURITY: WITHIN THE LAST YEAR, HAVE YOU BEEN AFRAID OF YOUR PARTNER OR EX-PARTNER?: NO

## 2025-02-10 SDOH — HEALTH STABILITY: MENTAL HEALTH: WISH TO BE DEAD (PAST 1 MONTH): NO

## 2025-02-10 SDOH — SOCIAL STABILITY: SOCIAL INSECURITY
WITHIN THE LAST YEAR, HAVE YOU BEEN KICKED, HIT, SLAPPED, OR OTHERWISE PHYSICALLY HURT BY YOUR PARTNER OR EX-PARTNER?: NO

## 2025-02-10 SDOH — SOCIAL STABILITY: SOCIAL INSECURITY: ARE THERE ANY APPARENT SIGNS OF INJURIES/BEHAVIORS THAT COULD BE RELATED TO ABUSE/NEGLECT?: NO

## 2025-02-10 SDOH — HEALTH STABILITY: MENTAL HEALTH: SUICIDAL BEHAVIOR (LIFETIME): NO

## 2025-02-10 SDOH — HEALTH STABILITY: MENTAL HEALTH: HAVE YOU USED ANY SUBSTANCES (CANABIS, COCAINE, HEROIN, HALLUCINOGENS, INHALANTS, ETC.) IN THE PAST 12 MONTHS?: NO

## 2025-02-10 SDOH — SOCIAL STABILITY: SOCIAL INSECURITY: ARE YOU OR HAVE YOU BEEN THREATENED OR ABUSED PHYSICALLY, EMOTIONALLY, OR SEXUALLY BY ANYONE?: NO

## 2025-02-10 SDOH — SOCIAL STABILITY: SOCIAL INSECURITY: DO YOU FEEL ANYONE HAS EXPLOITED OR TAKEN ADVANTAGE OF YOU FINANCIALLY OR OF YOUR PERSONAL PROPERTY?: NO

## 2025-02-10 SDOH — SOCIAL STABILITY: SOCIAL INSECURITY: HAVE YOU HAD THOUGHTS OF HARMING ANYONE ELSE?: NO

## 2025-02-10 SDOH — SOCIAL STABILITY: SOCIAL INSECURITY: HAS ANYONE EVER THREATENED TO HURT YOUR FAMILY OR YOUR PETS?: NO

## 2025-02-10 SDOH — SOCIAL STABILITY: SOCIAL INSECURITY: HAVE YOU HAD ANY THOUGHTS OF HARMING ANYONE ELSE?: NO

## 2025-02-10 SDOH — HEALTH STABILITY: MENTAL HEALTH: NON-SPECIFIC ACTIVE SUICIDAL THOUGHTS (PAST 1 MONTH): NO

## 2025-02-10 ASSESSMENT — ACTIVITIES OF DAILY LIVING (ADL): LACK_OF_TRANSPORTATION: NO

## 2025-02-10 ASSESSMENT — LIFESTYLE VARIABLES
HOW OFTEN DO YOU HAVE A DRINK CONTAINING ALCOHOL: NEVER
AUDIT-C TOTAL SCORE: 0
SKIP TO QUESTIONS 9-10: 1
HOW MANY STANDARD DRINKS CONTAINING ALCOHOL DO YOU HAVE ON A TYPICAL DAY: PATIENT DOES NOT DRINK
HOW OFTEN DO YOU HAVE 6 OR MORE DRINKS ON ONE OCCASION: NEVER
AUDIT-C TOTAL SCORE: 0

## 2025-02-10 ASSESSMENT — PAIN SCALES - GENERAL
PAINLEVEL_OUTOF10: 2
PAINLEVEL_OUTOF10: 5 - MODERATE PAIN
PAINLEVEL_OUTOF10: 0 - NO PAIN
PAINLEVEL_OUTOF10: 4
PAINLEVEL_OUTOF10: 3

## 2025-02-10 ASSESSMENT — PATIENT HEALTH QUESTIONNAIRE - PHQ9
1. LITTLE INTEREST OR PLEASURE IN DOING THINGS: NOT AT ALL
2. FEELING DOWN, DEPRESSED OR HOPELESS: NOT AT ALL
SUM OF ALL RESPONSES TO PHQ9 QUESTIONS 1 & 2: 0

## 2025-02-10 NOTE — CARE PLAN
The patient's goals for the shift include have a baby    The clinical goals for the shift include reassuring fetal and maternal status    Problem: Vaginal Birth or  Section  Goal: Fetal and maternal status remain reassuring during the birth process  Flowsheets (Taken 2/10/2025 1227)  Fetal and maternal status remain reassuring during the birth process:   Monitor vital signs   Monitor fetal heart rate   Monitor uterine activity  Goal: Minimal s/sx of HDP and BP<160/110  Flowsheets (Taken 2/10/2025 1227)  Minimal s/sx of HDP and BP <160/110: Monitor QBL and vital signs     Problem: Safety - Adult  Goal: Free from fall injury  Flowsheets (Taken 2/10/2025 1227)  Free from fall injury: Instruct family/caregiver on patient safety

## 2025-02-10 NOTE — H&P
Chief Complaint: High blood pressure readings at home.     Assessment/Plan   Vianney Miner is a 39 y.o.  at 39w2d, ABBEY: 2/15/2025, by Last Menstrual Period, who presents to triage for elevated blood pressure readings taken with home cuff.     Gestational Hypertension vs Pre-eclampsia  Plan:  - Induction of labor given that the pregnancy is full term and the risks associated with continued gestational hypertension.   - Q4H BP checks  - HELLP labs ordered- wnl   TP - 0.26   - Cervical ripening balloon inserted, filled with 60 cc of saline at 1320. Patient was /3 on SVE   - Will continue to closely monitor for severe signs, no current severe signs shown. Will plan for magnesium sulfate if needed.     Anxiety/Depression  Plan:  - Continue Wellbutrin 150 mg QAM  - Continue Zoloft 25 mg QPM    Subjective  History Of Present Illness  Vianney Miner is a 39 y.o.  at 39w2d, ABBEY: 2/15/2025, by Last Menstrual Period, who presents to triage for elevated blood pressure readings taken with home cuff= 140s/90s at home this am . Current blood pressure in the unit is 140/91. Patient denies headaches, vision changes, or RUQ pain.      Past Medical History  Past Medical History:   Diagnosis Date    Abnormal Pap smear of cervix Not recently    Anxiety 2004    Depression     Endometriosis Surgery 2024    HPV (human papilloma virus) infection No longer have it    Interstitial cystitis (chronic) without hematuria 2020    Cystitis, interstitial    Other specified disorders of nose and nasal sinuses 2020    Nasal hypertrophy    Personal history of diseases of the skin and subcutaneous tissue 2020    History of folliculitis    Substance abuse (Multi) Sober from alcohol since 2016    Urinary tract infection 2005    Uterine anomaly        Surgical History  Past Surgical History:   Procedure Laterality Date    CYSTOSCOPY      ENDOMETRIAL ABLATION  2024        Social History  She  "reports that she quit smoking about 8 years ago. Her smoking use included cigarettes. She started smoking about 13 years ago. She has a 5 pack-year smoking history. She has never used smokeless tobacco. She reports that she does not currently use drugs after having used the following drugs: Marijuana. Frequency: 1.00 time per week. She reports that she does not drink alcohol.    Family History  Family History   Problem Relation Name Age of Onset    Autoimmune disease Father Zeus     Hearing loss Father Zeus     Cancer Maternal Grandmother Cynthia     Diabetes Maternal Grandmother Cynthia      Allergies  Augmentin [amoxicillin-pot clavulanate]      Objective    Last Recorded Vitals  Blood pressure (!) 140/91, pulse 103, temperature 36.9 °C (98.4 °F), temperature source Oral, resp. rate 16, height 1.727 m (5' 8\"), weight 101 kg, last menstrual period 05/11/2024, not currently breastfeeding.    Physical Exam  General: NAD, mood appropriate  Cardiopulmonary: warm and well perfused, breathing comfortably on room air  Abdomen: Gravid, non-tender  Extremities: Symmetric    Fetal Monitoring  Baseline: 130s  Variability: moderate  Accelerations: yes   Decelerations: none   Uterine activity: none   Interpretation: Cat 1     Luigi Gates, MS3   2/10/25 at 12:51 PM.       I agree with this note. This patient was seen and examined by me. Additions/ corrections made in text by me.     Josephine Elias MD    "

## 2025-02-10 NOTE — PROGRESS NOTES
Labor Progress Note    Pt is doing well. BP mild range .   FHR Category 1 . Contractions q 2-4    minutes .   Vag exam : CRB in place / 2/100/-2     Plan: feeling moderate cramping . Try Tylenol 975 mg or Nubain.    Cytotec dose 2 inserted vaginally     Josephine Elias MD

## 2025-02-10 NOTE — ANESTHESIA PREPROCEDURE EVALUATION
Patient: Vianney Miner    Evaluation Method: In-person visit    Procedure Information    Date: 02/10/25  Procedure: Labor Consult         Relevant Problems   Neuro   (+) Depression   (+) Depression affecting pregnancy in first trimester, antepartum ([facility])      Musculoskeletal   (+) Scoliosis of lumbar spine      ID   (+) Glen's disease      GYN   (+) 37 weeks gestation of pregnancy ([facility])       Clinical information reviewed:   Tobacco  Allergies  Meds   Med Hx  Surg Hx            NPO Detail:  No data recorded     OB/GYN     Physical Exam    Airway  Mallampati: III  TM distance: <3 FB     Cardiovascular - normal exam  Rhythm: regular  Rate: normal     Dental - normal exam     Pulmonary - normal exam     Abdominal            Anesthesia Plan    History of general anesthesia?: yes  History of complications of general anesthesia?: no    ASA 2     epidural   (I informed and discussed the risks and benefits of general, spinal and epidural anesthesia with the patient.  The patient expressed her understanding and her questions were answered.  A verbal consent was given by the patient.     Pt has mild scoliosis,  no brace or instrumentation. Pt says she completed PT and has had no problems with her back.   Pt denies knowledge of Glen's disease. ETOH abuse, pt sober for 8 years. )  The patient is not a current smoker.    Anesthetic plan and risks discussed with patient.  Use of blood products discussed with patient and spouse who consented to blood products.    Plan discussed with CRNA.

## 2025-02-11 LAB
ALBUMIN SERPL BCP-MCNC: 3.1 G/DL (ref 3.4–5)
ALP SERPL-CCNC: 99 U/L (ref 33–110)
ALT SERPL W P-5'-P-CCNC: 12 U/L (ref 7–45)
ANION GAP SERPL CALC-SCNC: 15 MMOL/L (ref 10–20)
APPEARANCE UR: CLEAR
APTT PPP: 24 SECONDS (ref 27–38)
AST SERPL W P-5'-P-CCNC: 20 U/L (ref 9–39)
BASE EXCESS BLDCOA CALC-SCNC: -9.5 MMOL/L (ref -10.8–-0.5)
BASE EXCESS BLDCOV CALC-SCNC: -9.2 MMOL/L (ref -8.1–-0.5)
BASOPHILS # BLD AUTO: 0.05 X10*3/UL (ref 0–0.1)
BASOPHILS NFR BLD AUTO: 0.2 %
BILIRUB SERPL-MCNC: 0.4 MG/DL (ref 0–1.2)
BILIRUB UR STRIP.AUTO-MCNC: NEGATIVE MG/DL
BNP SERPL-MCNC: 39 PG/ML (ref 0–99)
BODY TEMPERATURE: ABNORMAL
BODY TEMPERATURE: ABNORMAL
BUN SERPL-MCNC: 16 MG/DL (ref 6–23)
CALCIUM SERPL-MCNC: 8.3 MG/DL (ref 8.6–10.3)
CHLORIDE SERPL-SCNC: 102 MMOL/L (ref 98–107)
CO2 SERPL-SCNC: 18 MMOL/L (ref 21–32)
COLOR UR: COLORLESS
CREAT SERPL-MCNC: 0.61 MG/DL (ref 0.5–1.05)
EGFRCR SERPLBLD CKD-EPI 2021: >90 ML/MIN/1.73M*2
EOSINOPHIL # BLD AUTO: 0.01 X10*3/UL (ref 0–0.7)
EOSINOPHIL NFR BLD AUTO: 0 %
ERYTHROCYTE [DISTWIDTH] IN BLOOD BY AUTOMATED COUNT: 15.4 % (ref 11.5–14.5)
ERYTHROCYTE [DISTWIDTH] IN BLOOD BY AUTOMATED COUNT: 15.4 % (ref 11.5–14.5)
FIBRINOGEN PPP-MCNC: 396 MG/DL (ref 200–400)
GLUCOSE SERPL-MCNC: 93 MG/DL (ref 74–99)
GLUCOSE UR STRIP.AUTO-MCNC: NORMAL MG/DL
HCO3 BLDCOA-SCNC: 21 MMOL/L (ref 15–29)
HCO3 BLDCOV-SCNC: 19.7 MMOL/L (ref 16–26)
HCT VFR BLD AUTO: 32 % (ref 36–46)
HCT VFR BLD AUTO: 32.7 % (ref 36–46)
HGB BLD-MCNC: 10 G/DL (ref 12–16)
HGB BLD-MCNC: 9.9 G/DL (ref 12–16)
HOLD SPECIMEN: NORMAL
IMM GRANULOCYTES # BLD AUTO: 0.24 X10*3/UL (ref 0–0.7)
IMM GRANULOCYTES NFR BLD AUTO: 1 % (ref 0–0.9)
INHALED O2 CONCENTRATION: 21 %
INHALED O2 CONCENTRATION: 21 %
INR PPP: 0.9 (ref 0.9–1.1)
KETONES UR STRIP.AUTO-MCNC: NEGATIVE MG/DL
LACTATE SERPL-SCNC: 2 MMOL/L (ref 0.4–2)
LACTATE SERPL-SCNC: 2.1 MMOL/L (ref 0.4–2)
LACTATE SERPL-SCNC: 2.1 MMOL/L (ref 0.4–2)
LACTATE SERPL-SCNC: 2.7 MMOL/L (ref 0.4–2)
LEUKOCYTE ESTERASE UR QL STRIP.AUTO: NEGATIVE
LYMPHOCYTES # BLD AUTO: 1.06 X10*3/UL (ref 1.2–4.8)
LYMPHOCYTES NFR BLD AUTO: 4.5 %
MCH RBC QN AUTO: 26.8 PG (ref 26–34)
MCH RBC QN AUTO: 27.2 PG (ref 26–34)
MCHC RBC AUTO-ENTMCNC: 30.3 G/DL (ref 32–36)
MCHC RBC AUTO-ENTMCNC: 31.3 G/DL (ref 32–36)
MCV RBC AUTO: 87 FL (ref 80–100)
MCV RBC AUTO: 88 FL (ref 80–100)
MONOCYTES # BLD AUTO: 1.03 X10*3/UL (ref 0.1–1)
MONOCYTES NFR BLD AUTO: 4.4 %
MUCOUS THREADS #/AREA URNS AUTO: ABNORMAL /LPF
NEUTROPHILS # BLD AUTO: 21.04 X10*3/UL (ref 1.2–7.7)
NEUTROPHILS NFR BLD AUTO: 89.9 %
NITRITE UR QL STRIP.AUTO: NEGATIVE
NRBC BLD-RTO: 0 /100 WBCS (ref 0–0)
NRBC BLD-RTO: 0.1 /100 WBCS (ref 0–0)
OXYHGB MFR BLDCOA: 15.9 % (ref 94–98)
OXYHGB MFR BLDCOV: 22.8 % (ref 94–98)
PCO2 BLDCOA: 66 MM HG (ref 31–75)
PCO2 BLDCOV: 54 MM HG (ref 22–53)
PH BLDCOA: 7.11 PH (ref 7.08–7.39)
PH BLDCOV: 7.17 PH (ref 7.19–7.47)
PH UR STRIP.AUTO: 5 [PH]
PLATELET # BLD AUTO: 303 X10*3/UL (ref 150–450)
PLATELET # BLD AUTO: 306 X10*3/UL (ref 150–450)
PO2 BLDCOA: 13 MM HG (ref 5–31)
PO2 BLDCOV: 17 MM HG (ref 13–37)
POTASSIUM SERPL-SCNC: 4.1 MMOL/L (ref 3.5–5.3)
PROT SERPL-MCNC: 5.6 G/DL (ref 6.4–8.2)
PROT UR STRIP.AUTO-MCNC: NEGATIVE MG/DL
PROTHROMBIN TIME: 9.9 SECONDS (ref 9.8–12.8)
RBC # BLD AUTO: 3.68 X10*6/UL (ref 4–5.2)
RBC # BLD AUTO: 3.7 X10*6/UL (ref 4–5.2)
RBC # UR STRIP.AUTO: ABNORMAL MG/DL
RBC #/AREA URNS AUTO: ABNORMAL /HPF
SAO2 % BLDCOA: 16 % (ref 3–69)
SAO2 % BLDCOV: 23 % (ref 16–84)
SODIUM SERPL-SCNC: 131 MMOL/L (ref 136–145)
SP GR UR STRIP.AUTO: 1.02
TEST COMMENT: ABNORMAL
TEST COMMENT: ABNORMAL
UROBILINOGEN UR STRIP.AUTO-MCNC: NORMAL MG/DL
WBC # BLD AUTO: 23.4 X10*3/UL (ref 4.4–11.3)
WBC # BLD AUTO: 24.9 X10*3/UL (ref 4.4–11.3)
WBC #/AREA URNS AUTO: ABNORMAL /HPF

## 2025-02-11 PROCEDURE — 36415 COLL VENOUS BLD VENIPUNCTURE: CPT | Performed by: OBSTETRICS & GYNECOLOGY

## 2025-02-11 PROCEDURE — 2500000001 HC RX 250 WO HCPCS SELF ADMINISTERED DRUGS (ALT 637 FOR MEDICARE OP): Performed by: OBSTETRICS & GYNECOLOGY

## 2025-02-11 PROCEDURE — 2500000004 HC RX 250 GENERAL PHARMACY W/ HCPCS (ALT 636 FOR OP/ED): Performed by: STUDENT IN AN ORGANIZED HEALTH CARE EDUCATION/TRAINING PROGRAM

## 2025-02-11 PROCEDURE — 36430 TRANSFUSION BLD/BLD COMPNT: CPT

## 2025-02-11 PROCEDURE — 2500000004 HC RX 250 GENERAL PHARMACY W/ HCPCS (ALT 636 FOR OP/ED): Performed by: OBSTETRICS & GYNECOLOGY

## 2025-02-11 PROCEDURE — 7100000016 HC LABOR RECOVERY PER HOUR: Performed by: OBSTETRICS & GYNECOLOGY

## 2025-02-11 PROCEDURE — 85025 COMPLETE CBC W/AUTO DIFF WBC: CPT | Performed by: OBSTETRICS & GYNECOLOGY

## 2025-02-11 PROCEDURE — 2500000002 HC RX 250 W HCPCS SELF ADMINISTERED DRUGS (ALT 637 FOR MEDICARE OP, ALT 636 FOR OP/ED): Performed by: OBSTETRICS & GYNECOLOGY

## 2025-02-11 PROCEDURE — 83605 ASSAY OF LACTIC ACID: CPT | Performed by: OBSTETRICS & GYNECOLOGY

## 2025-02-11 PROCEDURE — 82805 BLOOD GASES W/O2 SATURATION: CPT | Performed by: OBSTETRICS & GYNECOLOGY

## 2025-02-11 PROCEDURE — 83880 ASSAY OF NATRIURETIC PEPTIDE: CPT | Performed by: OBSTETRICS & GYNECOLOGY

## 2025-02-11 PROCEDURE — 85461 HEMOGLOBIN FETAL: CPT

## 2025-02-11 PROCEDURE — 87040 BLOOD CULTURE FOR BACTERIA: CPT | Mod: STJLAB | Performed by: OBSTETRICS & GYNECOLOGY

## 2025-02-11 PROCEDURE — 2500000004 HC RX 250 GENERAL PHARMACY W/ HCPCS (ALT 636 FOR OP/ED): Performed by: NURSE ANESTHETIST, CERTIFIED REGISTERED

## 2025-02-11 PROCEDURE — 83605 ASSAY OF LACTIC ACID: CPT | Performed by: STUDENT IN AN ORGANIZED HEALTH CARE EDUCATION/TRAINING PROGRAM

## 2025-02-11 PROCEDURE — 59514 CESAREAN DELIVERY ONLY: CPT | Performed by: OBSTETRICS & GYNECOLOGY

## 2025-02-11 PROCEDURE — 88307 TISSUE EXAM BY PATHOLOGIST: CPT | Mod: TC,STJLAB | Performed by: OBSTETRICS & GYNECOLOGY

## 2025-02-11 PROCEDURE — 85027 COMPLETE CBC AUTOMATED: CPT | Performed by: STUDENT IN AN ORGANIZED HEALTH CARE EDUCATION/TRAINING PROGRAM

## 2025-02-11 PROCEDURE — 59510 CESAREAN DELIVERY: CPT | Performed by: OBSTETRICS & GYNECOLOGY

## 2025-02-11 PROCEDURE — 2500000005 HC RX 250 GENERAL PHARMACY W/O HCPCS: Performed by: NURSE ANESTHETIST, CERTIFIED REGISTERED

## 2025-02-11 PROCEDURE — 80053 COMPREHEN METABOLIC PANEL: CPT | Performed by: OBSTETRICS & GYNECOLOGY

## 2025-02-11 PROCEDURE — 2720000007 HC OR 272 NO HCPCS: Performed by: OBSTETRICS & GYNECOLOGY

## 2025-02-11 PROCEDURE — P9016 RBC LEUKOCYTES REDUCED: HCPCS

## 2025-02-11 PROCEDURE — 7100000016 HC LABOR RECOVERY PER HOUR

## 2025-02-11 PROCEDURE — 85610 PROTHROMBIN TIME: CPT | Performed by: OBSTETRICS & GYNECOLOGY

## 2025-02-11 PROCEDURE — 3700000014 HC AN EPIDURAL BLOCK CHARGE: Performed by: OBSTETRICS & GYNECOLOGY

## 2025-02-11 PROCEDURE — 1120000001 HC OB PRIVATE ROOM DAILY

## 2025-02-11 PROCEDURE — 99199 UNLISTED SPECIAL SVC PX/RPRT: CPT

## 2025-02-11 PROCEDURE — 85384 FIBRINOGEN ACTIVITY: CPT | Performed by: OBSTETRICS & GYNECOLOGY

## 2025-02-11 PROCEDURE — 81001 URINALYSIS AUTO W/SCOPE: CPT | Performed by: OBSTETRICS & GYNECOLOGY

## 2025-02-11 PROCEDURE — 2500000001 HC RX 250 WO HCPCS SELF ADMINISTERED DRUGS (ALT 637 FOR MEDICARE OP): Performed by: NURSE ANESTHETIST, CERTIFIED REGISTERED

## 2025-02-11 RX ORDER — DIPHENHYDRAMINE HYDROCHLORIDE 50 MG/ML
25 INJECTION INTRAMUSCULAR; INTRAVENOUS EVERY 4 HOURS PRN
Status: DISCONTINUED | OUTPATIENT
Start: 2025-02-11 | End: 2025-02-14 | Stop reason: HOSPADM

## 2025-02-11 RX ORDER — NALOXONE HYDROCHLORIDE 0.4 MG/ML
0.1 INJECTION, SOLUTION INTRAMUSCULAR; INTRAVENOUS; SUBCUTANEOUS EVERY 5 MIN PRN
Status: DISCONTINUED | OUTPATIENT
Start: 2025-02-11 | End: 2025-02-14 | Stop reason: HOSPADM

## 2025-02-11 RX ORDER — MORPHINE SULFATE 1 MG/ML
INJECTION, SOLUTION EPIDURAL; INTRATHECAL; INTRAVENOUS AS NEEDED
Status: DISCONTINUED | OUTPATIENT
Start: 2025-02-11 | End: 2025-02-11

## 2025-02-11 RX ORDER — FENTANYL CITRATE 50 UG/ML
INJECTION, SOLUTION INTRAMUSCULAR; INTRAVENOUS AS NEEDED
Status: DISCONTINUED | OUTPATIENT
Start: 2025-02-11 | End: 2025-02-11

## 2025-02-11 RX ORDER — NIFEDIPINE 60 MG/1
60 TABLET, FILM COATED, EXTENDED RELEASE ORAL
Status: DISCONTINUED | OUTPATIENT
Start: 2025-02-12 | End: 2025-02-13

## 2025-02-11 RX ORDER — AZITHROMYCIN MONOHYDRATE 500 MG/5ML
INJECTION, POWDER, LYOPHILIZED, FOR SOLUTION INTRAVENOUS AS NEEDED
Status: DISCONTINUED | OUTPATIENT
Start: 2025-02-11 | End: 2025-02-11

## 2025-02-11 RX ORDER — SIMETHICONE 80 MG
80 TABLET,CHEWABLE ORAL 4 TIMES DAILY PRN
Status: DISCONTINUED | OUTPATIENT
Start: 2025-02-11 | End: 2025-02-14 | Stop reason: HOSPADM

## 2025-02-11 RX ORDER — POLYETHYLENE GLYCOL 3350 17 G/17G
17 POWDER, FOR SOLUTION ORAL 2 TIMES DAILY PRN
Status: DISCONTINUED | OUTPATIENT
Start: 2025-02-11 | End: 2025-02-14 | Stop reason: HOSPADM

## 2025-02-11 RX ORDER — HYDRALAZINE HYDROCHLORIDE 20 MG/ML
5 INJECTION INTRAMUSCULAR; INTRAVENOUS ONCE AS NEEDED
Status: DISCONTINUED | OUTPATIENT
Start: 2025-02-11 | End: 2025-02-14 | Stop reason: HOSPADM

## 2025-02-11 RX ORDER — OXYCODONE HYDROCHLORIDE 5 MG/1
5 TABLET ORAL EVERY 4 HOURS PRN
Status: DISCONTINUED | OUTPATIENT
Start: 2025-02-12 | End: 2025-02-14 | Stop reason: HOSPADM

## 2025-02-11 RX ORDER — ACETAMINOPHEN 120 MG/1
SUPPOSITORY RECTAL AS NEEDED
Status: DISCONTINUED | OUTPATIENT
Start: 2025-02-11 | End: 2025-02-11

## 2025-02-11 RX ORDER — MAGNESIUM SULFATE HEPTAHYDRATE 40 MG/ML
2 INJECTION, SOLUTION INTRAVENOUS CONTINUOUS
Status: DISCONTINUED | OUTPATIENT
Start: 2025-02-11 | End: 2025-02-12

## 2025-02-11 RX ORDER — DIPHENHYDRAMINE HCL 25 MG
25 TABLET ORAL EVERY 4 HOURS PRN
Status: DISCONTINUED | OUTPATIENT
Start: 2025-02-11 | End: 2025-02-14 | Stop reason: HOSPADM

## 2025-02-11 RX ORDER — LOPERAMIDE HYDROCHLORIDE 2 MG/1
4 CAPSULE ORAL EVERY 2 HOUR PRN
Status: DISCONTINUED | OUTPATIENT
Start: 2025-02-11 | End: 2025-02-14 | Stop reason: HOSPADM

## 2025-02-11 RX ORDER — HYDROMORPHONE HYDROCHLORIDE 1 MG/ML
0.2 INJECTION, SOLUTION INTRAMUSCULAR; INTRAVENOUS; SUBCUTANEOUS EVERY 5 MIN PRN
Status: DISCONTINUED | OUTPATIENT
Start: 2025-02-11 | End: 2025-02-12 | Stop reason: HOSPADM

## 2025-02-11 RX ORDER — BISACODYL 10 MG/1
10 SUPPOSITORY RECTAL DAILY PRN
Status: DISCONTINUED | OUTPATIENT
Start: 2025-02-11 | End: 2025-02-14 | Stop reason: HOSPADM

## 2025-02-11 RX ORDER — FAMOTIDINE 10 MG/ML
INJECTION INTRAVENOUS AS NEEDED
Status: DISCONTINUED | OUTPATIENT
Start: 2025-02-11 | End: 2025-02-11

## 2025-02-11 RX ORDER — OXYTOCIN 10 [USP'U]/ML
10 INJECTION, SOLUTION INTRAMUSCULAR; INTRAVENOUS ONCE AS NEEDED
Status: DISCONTINUED | OUTPATIENT
Start: 2025-02-11 | End: 2025-02-14 | Stop reason: HOSPADM

## 2025-02-11 RX ORDER — ONDANSETRON 4 MG/1
4 TABLET, FILM COATED ORAL EVERY 6 HOURS PRN
Status: DISCONTINUED | OUTPATIENT
Start: 2025-02-11 | End: 2025-02-14 | Stop reason: HOSPADM

## 2025-02-11 RX ORDER — HYDROMORPHONE HYDROCHLORIDE 1 MG/ML
0.2 INJECTION, SOLUTION INTRAMUSCULAR; INTRAVENOUS; SUBCUTANEOUS EVERY 5 MIN PRN
Status: DISCONTINUED | OUTPATIENT
Start: 2025-02-11 | End: 2025-02-14 | Stop reason: HOSPADM

## 2025-02-11 RX ORDER — ENOXAPARIN SODIUM 100 MG/ML
40 INJECTION SUBCUTANEOUS EVERY 24 HOURS
Status: DISCONTINUED | OUTPATIENT
Start: 2025-02-11 | End: 2025-02-14 | Stop reason: HOSPADM

## 2025-02-11 RX ORDER — SODIUM CHLORIDE, SODIUM LACTATE, POTASSIUM CHLORIDE, CALCIUM CHLORIDE 600; 310; 30; 20 MG/100ML; MG/100ML; MG/100ML; MG/100ML
125 INJECTION, SOLUTION INTRAVENOUS CONTINUOUS
Status: DISCONTINUED | OUTPATIENT
Start: 2025-02-11 | End: 2025-02-11

## 2025-02-11 RX ORDER — BUPROPION HYDROCHLORIDE 150 MG/1
150 TABLET ORAL EVERY MORNING
Status: DISCONTINUED | OUTPATIENT
Start: 2025-02-11 | End: 2025-02-14 | Stop reason: HOSPADM

## 2025-02-11 RX ORDER — SODIUM CHLORIDE, SODIUM LACTATE, POTASSIUM CHLORIDE, CALCIUM CHLORIDE 600; 310; 30; 20 MG/100ML; MG/100ML; MG/100ML; MG/100ML
75 INJECTION, SOLUTION INTRAVENOUS CONTINUOUS
Status: ACTIVE | OUTPATIENT
Start: 2025-02-11 | End: 2025-02-12

## 2025-02-11 RX ORDER — ENOXAPARIN SODIUM 100 MG/ML
40 INJECTION SUBCUTANEOUS EVERY 24 HOURS
Status: DISCONTINUED | OUTPATIENT
Start: 2025-02-11 | End: 2025-02-11

## 2025-02-11 RX ORDER — NIFEDIPINE 30 MG/1
30 TABLET, FILM COATED, EXTENDED RELEASE ORAL
Status: DISCONTINUED | OUTPATIENT
Start: 2025-02-11 | End: 2025-02-11

## 2025-02-11 RX ORDER — ACETAMINOPHEN 325 MG/1
975 TABLET ORAL EVERY 6 HOURS SCHEDULED
Status: DISCONTINUED | OUTPATIENT
Start: 2025-02-11 | End: 2025-02-14 | Stop reason: HOSPADM

## 2025-02-11 RX ORDER — LIDOCAINE 560 MG/1
1 PATCH PERCUTANEOUS; TOPICAL; TRANSDERMAL
Status: DISCONTINUED | OUTPATIENT
Start: 2025-02-11 | End: 2025-02-14 | Stop reason: HOSPADM

## 2025-02-11 RX ORDER — LABETALOL HYDROCHLORIDE 5 MG/ML
20 INJECTION, SOLUTION INTRAVENOUS ONCE AS NEEDED
Status: DISCONTINUED | OUTPATIENT
Start: 2025-02-11 | End: 2025-02-14 | Stop reason: HOSPADM

## 2025-02-11 RX ORDER — IBUPROFEN 600 MG/1
600 TABLET ORAL EVERY 6 HOURS
Status: DISCONTINUED | OUTPATIENT
Start: 2025-02-12 | End: 2025-02-14 | Stop reason: HOSPADM

## 2025-02-11 RX ORDER — TRANEXAMIC ACID 100 MG/ML
INJECTION, SOLUTION INTRAVENOUS AS NEEDED
Status: DISCONTINUED | OUTPATIENT
Start: 2025-02-11 | End: 2025-02-11

## 2025-02-11 RX ORDER — SCOPOLAMINE 1 MG/3D
1 PATCH, EXTENDED RELEASE TRANSDERMAL
Status: COMPLETED | OUTPATIENT
Start: 2025-02-11 | End: 2025-02-14

## 2025-02-11 RX ORDER — MISOPROSTOL 200 UG/1
800 TABLET ORAL ONCE AS NEEDED
Status: DISCONTINUED | OUTPATIENT
Start: 2025-02-11 | End: 2025-02-14 | Stop reason: HOSPADM

## 2025-02-11 RX ORDER — CEFAZOLIN SODIUM 2 G/100ML
INJECTION, SOLUTION INTRAVENOUS AS NEEDED
Status: DISCONTINUED | OUTPATIENT
Start: 2025-02-11 | End: 2025-02-11

## 2025-02-11 RX ORDER — CALCIUM GLUCONATE 98 MG/ML
1 INJECTION, SOLUTION INTRAVENOUS ONCE AS NEEDED
Status: DISCONTINUED | OUTPATIENT
Start: 2025-02-11 | End: 2025-02-14 | Stop reason: HOSPADM

## 2025-02-11 RX ORDER — METOCLOPRAMIDE HYDROCHLORIDE 5 MG/ML
10 INJECTION INTRAMUSCULAR; INTRAVENOUS EVERY 6 HOURS PRN
Status: DISCONTINUED | OUTPATIENT
Start: 2025-02-11 | End: 2025-02-14 | Stop reason: HOSPADM

## 2025-02-11 RX ORDER — OXYCODONE HYDROCHLORIDE 10 MG/1
10 TABLET ORAL EVERY 4 HOURS PRN
Status: DISCONTINUED | OUTPATIENT
Start: 2025-02-12 | End: 2025-02-14 | Stop reason: HOSPADM

## 2025-02-11 RX ORDER — KETOROLAC TROMETHAMINE 30 MG/ML
INJECTION, SOLUTION INTRAMUSCULAR; INTRAVENOUS AS NEEDED
Status: DISCONTINUED | OUTPATIENT
Start: 2025-02-11 | End: 2025-02-11

## 2025-02-11 RX ORDER — NIFEDIPINE 30 MG/1
30 TABLET, FILM COATED, EXTENDED RELEASE ORAL ONCE
Status: DISCONTINUED | OUTPATIENT
Start: 2025-02-11 | End: 2025-02-11

## 2025-02-11 RX ORDER — KETOROLAC TROMETHAMINE 30 MG/ML
30 INJECTION, SOLUTION INTRAMUSCULAR; INTRAVENOUS EVERY 6 HOURS
Status: COMPLETED | OUTPATIENT
Start: 2025-02-11 | End: 2025-02-11

## 2025-02-11 RX ORDER — ONDANSETRON HYDROCHLORIDE 2 MG/ML
4 INJECTION, SOLUTION INTRAVENOUS EVERY 6 HOURS PRN
Status: DISCONTINUED | OUTPATIENT
Start: 2025-02-11 | End: 2025-02-14 | Stop reason: HOSPADM

## 2025-02-11 RX ORDER — LIDOCAINE HYDROCHLORIDE 20 MG/ML
INJECTION, SOLUTION INFILTRATION; PERINEURAL AS NEEDED
Status: DISCONTINUED | OUTPATIENT
Start: 2025-02-11 | End: 2025-02-11

## 2025-02-11 RX ORDER — NIFEDIPINE 10 MG/1
10 CAPSULE ORAL ONCE AS NEEDED
Status: DISCONTINUED | OUTPATIENT
Start: 2025-02-11 | End: 2025-02-14 | Stop reason: HOSPADM

## 2025-02-11 RX ORDER — ACETAMINOPHEN 325 MG/1
650 TABLET ORAL ONCE
Status: COMPLETED | OUTPATIENT
Start: 2025-02-11 | End: 2025-02-11

## 2025-02-11 RX ORDER — ADHESIVE BANDAGE
10 BANDAGE TOPICAL
Status: DISCONTINUED | OUTPATIENT
Start: 2025-02-11 | End: 2025-02-14 | Stop reason: HOSPADM

## 2025-02-11 RX ORDER — CARBOPROST TROMETHAMINE 250 UG/ML
250 INJECTION, SOLUTION INTRAMUSCULAR ONCE AS NEEDED
Status: DISCONTINUED | OUTPATIENT
Start: 2025-02-11 | End: 2025-02-14 | Stop reason: HOSPADM

## 2025-02-11 RX ADMIN — KETOROLAC TROMETHAMINE 30 MG: 30 INJECTION, SOLUTION INTRAMUSCULAR at 22:32

## 2025-02-11 RX ADMIN — PIPERACILLIN SODIUM AND TAZOBACTAM SODIUM 3.38 G: 3; .375 INJECTION, SOLUTION INTRAVENOUS at 10:11

## 2025-02-11 RX ADMIN — SODIUM CHLORIDE, POTASSIUM CHLORIDE, SODIUM LACTATE AND CALCIUM CHLORIDE 75 ML/HR: 600; 310; 30; 20 INJECTION, SOLUTION INTRAVENOUS at 08:00

## 2025-02-11 RX ADMIN — SODIUM CHLORIDE, POTASSIUM CHLORIDE, SODIUM LACTATE AND CALCIUM CHLORIDE 125 ML/HR: 600; 310; 30; 20 INJECTION, SOLUTION INTRAVENOUS at 00:14

## 2025-02-11 RX ADMIN — ACETAMINOPHEN 975 MG: 325 TABLET ORAL at 22:31

## 2025-02-11 RX ADMIN — ONDANSETRON 4 MG: 2 INJECTION INTRAMUSCULAR; INTRAVENOUS at 00:43

## 2025-02-11 RX ADMIN — ACETAMINOPHEN 975 MG: 325 TABLET ORAL at 10:46

## 2025-02-11 RX ADMIN — MISOPROSTOL 800 MCG: 200 TABLET ORAL at 01:11

## 2025-02-11 RX ADMIN — ACETAMINOPHEN 650 MG: 650 SUPPOSITORY RECTAL at 01:50

## 2025-02-11 RX ADMIN — Medication 100 MCG: at 01:02

## 2025-02-11 RX ADMIN — Medication 50 MCG: at 01:35

## 2025-02-11 RX ADMIN — PIPERACILLIN SODIUM AND TAZOBACTAM SODIUM 3.38 G: 3; .375 INJECTION, SOLUTION INTRAVENOUS at 16:01

## 2025-02-11 RX ADMIN — SCOPOLAMINE 1 PATCH: 1.5 PATCH, EXTENDED RELEASE TRANSDERMAL at 16:09

## 2025-02-11 RX ADMIN — KETOROLAC TROMETHAMINE 30 MG: 30 INJECTION, SOLUTION INTRAMUSCULAR at 01:36

## 2025-02-11 RX ADMIN — LABETALOL HYDROCHLORIDE 20 MG: 5 INJECTION, SOLUTION INTRAVENOUS at 03:18

## 2025-02-11 RX ADMIN — OXYTOCIN 600 MILLI-UNITS/MIN: 10 INJECTION, SOLUTION INTRAMUSCULAR; INTRAVENOUS at 00:57

## 2025-02-11 RX ADMIN — ENOXAPARIN SODIUM 40 MG: 40 INJECTION SUBCUTANEOUS at 16:03

## 2025-02-11 RX ADMIN — NIFEDIPINE 30 MG: 30 TABLET, FILM COATED, EXTENDED RELEASE ORAL at 03:52

## 2025-02-11 RX ADMIN — MAGNESIUM SULFATE HEPTAHYDRATE 2 G/HR: 40 INJECTION, SOLUTION INTRAVENOUS at 10:45

## 2025-02-11 RX ADMIN — PIPERACILLIN SODIUM AND TAZOBACTAM SODIUM 3.38 G: 3; .375 INJECTION, SOLUTION INTRAVENOUS at 22:31

## 2025-02-11 RX ADMIN — KETOROLAC TROMETHAMINE 30 MG: 30 INJECTION, SOLUTION INTRAMUSCULAR at 16:46

## 2025-02-11 RX ADMIN — Medication 50 MCG: at 01:12

## 2025-02-11 RX ADMIN — LIDOCAINE HYDROCHLORIDE 5 ML: 20 INJECTION, SOLUTION INFILTRATION; PERINEURAL at 00:40

## 2025-02-11 RX ADMIN — FENTANYL CITRATE 100 MCG: 50 INJECTION, SOLUTION INTRAMUSCULAR; INTRAVENOUS at 00:42

## 2025-02-11 RX ADMIN — SODIUM CHLORIDE, POTASSIUM CHLORIDE, SODIUM LACTATE AND CALCIUM CHLORIDE 125 ML/HR: 600; 310; 30; 20 INJECTION, SOLUTION INTRAVENOUS at 03:33

## 2025-02-11 RX ADMIN — ONDANSETRON 4 MG: 2 INJECTION INTRAMUSCULAR; INTRAVENOUS at 08:04

## 2025-02-11 RX ADMIN — Medication 50 MCG: at 01:32

## 2025-02-11 RX ADMIN — KETOROLAC TROMETHAMINE 30 MG: 30 INJECTION, SOLUTION INTRAMUSCULAR at 10:46

## 2025-02-11 RX ADMIN — CEFAZOLIN SODIUM 2 G: 2 INJECTION, SOLUTION INTRAVENOUS at 00:45

## 2025-02-11 RX ADMIN — MAGNESIUM SULFATE HEPTAHYDRATE 2 G/HR: 40 INJECTION, SOLUTION INTRAVENOUS at 20:20

## 2025-02-11 RX ADMIN — SODIUM CHLORIDE, POTASSIUM CHLORIDE, SODIUM LACTATE AND CALCIUM CHLORIDE 75 ML/HR: 600; 310; 30; 20 INJECTION, SOLUTION INTRAVENOUS at 15:39

## 2025-02-11 RX ADMIN — PIPERACILLIN SODIUM AND TAZOBACTAM SODIUM 4.5 G: 4; .5 INJECTION, SOLUTION INTRAVENOUS at 04:32

## 2025-02-11 RX ADMIN — MORPHINE SULFATE 2 MG: 1 INJECTION, SOLUTION EPIDURAL; INTRATHECAL; INTRAVENOUS at 01:27

## 2025-02-11 RX ADMIN — DEXAMETHASONE SODIUM PHOSPHATE 4 MG: 4 INJECTION, SOLUTION INTRAMUSCULAR; INTRAVENOUS at 00:59

## 2025-02-11 RX ADMIN — NIFEDIPINE 30 MG: 30 TABLET, FILM COATED, EXTENDED RELEASE ORAL at 07:39

## 2025-02-11 RX ADMIN — Medication 50 MCG: at 01:22

## 2025-02-11 RX ADMIN — BUPROPION HYDROCHLORIDE 150 MG: 150 TABLET, EXTENDED RELEASE ORAL at 10:17

## 2025-02-11 RX ADMIN — Medication 50 MCG: at 01:16

## 2025-02-11 RX ADMIN — HUMAN RHO(D) IMMUNE GLOBULIN 300 MCG: 300 INJECTION, SOLUTION INTRAMUSCULAR at 17:39

## 2025-02-11 RX ADMIN — SERTRALINE HYDROCHLORIDE 25 MG: 25 TABLET ORAL at 09:35

## 2025-02-11 RX ADMIN — Medication 50 MCG: at 01:08

## 2025-02-11 RX ADMIN — FAMOTIDINE 20 MG: 10 INJECTION, SOLUTION INTRAVENOUS at 00:50

## 2025-02-11 RX ADMIN — TRANEXAMIC ACID 1000 MG: 1 INJECTION, SOLUTION INTRAVENOUS at 01:10

## 2025-02-11 RX ADMIN — AZITHROMYCIN MONOHYDRATE 500 MG: 500 INJECTION, POWDER, LYOPHILIZED, FOR SOLUTION INTRAVENOUS at 00:50

## 2025-02-11 RX ADMIN — ACETAMINOPHEN 650 MG: 325 TABLET ORAL at 04:28

## 2025-02-11 RX ADMIN — Medication 100 MCG: at 00:58

## 2025-02-11 ASSESSMENT — PAIN SCALES - GENERAL
PAINLEVEL_OUTOF10: 0 - NO PAIN
PAINLEVEL_OUTOF10: 0 - NO PAIN
PAINLEVEL_OUTOF10: 1
PAINLEVEL_OUTOF10: 1
PAINLEVEL_OUTOF10: 2
PAIN_LEVEL: 0
PAINLEVEL_OUTOF10: 0 - NO PAIN
PAINLEVEL_OUTOF10: 2

## 2025-02-11 ASSESSMENT — PAIN DESCRIPTION - DESCRIPTORS
DESCRIPTORS: SORE

## 2025-02-11 ASSESSMENT — PAIN SCALES - PAIN ASSESSMENT IN ADVANCED DEMENTIA (PAINAD): TOTALSCORE: MEDICATION (SEE MAR)

## 2025-02-11 NOTE — PROGRESS NOTES
Postpartum Progress Note    Assessment/Plan   Vianney Miner is a 39 y.o., , who delivered at 39w3d gestation and is now postpartum day 0.    s/p CS  -Pain well controlled on current regimen   -Tolerating regular diet with anti-emetics PRN and bowel regimen ordered  -Not ambulating or voiding while on Mg  -Admission hgb 9.4 --> EBL 972cc --> 1U pRBCs -> immediate hgb 9.9; For repeat CBC in 6 hours. Continue to monitor for si/sx of anemia.   -Rh negative, baby Rh positive, for Rhogam today  -Rubella immune  -Lactation consultant PRN    Sepsis Trigger  -Temp of 39.4, HR 140s in recovery at 0330 this AM  -Sepsis labs notable for WBC 23.4 and lactate 2.1 -> 2.1 -> 2.7. No fluids given as she is clinically volume overloaded with weeping pitting edema of her bilateral LEs and with new diagnosis of sPEC. To encourage PO fluid intake and repeat lactate in 6 hours  -UA unremarkable, UCx and BCx pending  -No localizing si/sx of infection. Did get 800mcg buccal cytotec in OR. HR improved to 90s, satting well on RA, no other si/sx of VTE  -To continue zosyn until 24 hours afebrile    sPEC  -Diagnosed by severe range BPs requiring IV treatment  -s/p IV labetalol 20mg at 0318 this AM  -Now on Nifedipine 60mg daily. Consider PO lasix course if needing additional BP control given weeping edema on exam   -Asymptomatic  -HELLP labs neg x2, P:C 0.26  -Mg @ 2g/hr until 24 hours postpartum. Strict I/O's. No si/sx of Mg toxicity currently    DVT prophylaxis  -VTE risk score = 6, for PPx lonovex  -SCDs, ambulation    Dispo: anticipate discharge on POD#3 if continues to meet milestones. Plan for follow up in 1 week for incision check and in 4-6 weeks for PP visit.     Aislinn Reich MD   Assessment & Plan  Gestational HTN, third trimester ([facility])    Scoliosis of lumbar spine    Pregnancy Problems (from 24 to present)       Problem Noted Diagnosed Resolved    Gestational HTN, third trimester ([facility]) 2/10/2025 by  Josephine Elias MD  No    Priority:  Medium       37 weeks gestation of pregnancy ([facility]) 2024 by Caryn Cooper MD  No    Priority:  Medium       Overview Addendum 2025 10:23 AM by Caryn Cooper MD     ** Previa RESOLVED on follow up anatomic USN  - Repeat USN 31.2 - EFW 54% with no evidence of previa     Desired provider in labor: [] CNM  [x] Physician  [x] Blood Products: [x] Yes, accepts [] No, needs counseling  [x] Initial BMI: 22.81   [x] Prenatal Labs:   [x] Cervical Cancer Screening up to date- 2022 - nml, negative HPV   [x] Rh status: Negative   [x] Genetic Screening:  RR NIPS - It's a GIRL   [x] NT US: (11-13 wks): WNL   [x] Baby ASA (if indicated): started 24  [x] Pregnancy dated by:  LMP c/w USN at 7 weeks     [x] Anatomy US: (19-20 wks): WNL, had normal fetal echo   [x] 1hr GCT at 24-28wks: elevated but normal 3 hour GTT   [x] Rhogam (if indicated): given 11/18   [x] Tdap: given 11/18   [x] RSV (32-36 wks): given 1/3  [x] Flu Vaccine: Given /  [x] Updated COVID vaccine recommended     [x] Breastfeeding: has breast pump   [x] Postpartum Birth control method: partner planning vasectomy; may also elect for interval Mirena (endometriosis)   [x] GBS at 36 - 37 wks: negative   [x] 39 weeks discussion of IOL vs. Expectant management: IOL scheduled for  at 8pm   [x] Mode of delivery ( anticipated ):          Encounter for supervision of high risk pregnancy due to advanced maternal age in primigravida 2024 by Caryn Cooper MD  No    Priority:  Medium       Overview Addendum 2024  1:43 PM by Caryn Cooper MD     - RR NIPS  - Taking prophylactic ASA          Depression affecting pregnancy in first trimester, antepartum ([facility]) 2024 by Caryn Cooper MD  No    Priority:  Medium       Overview Addendum 2024  2:32 PM by Caryn Cooper MD     - on wellbutrin 150mg daily and zoloft 25mg daily   - managed by PCP           Complete placenta previa nos or without hemorrhage, unspecified trimester ([facility]) 10/2/2024 by Caryn Cooper MD  10/18/2024 by Caryn Cooper MD    Priority:  Medium       Overview Signed 10/2/2024  5:04 PM by Caryn Cooper MD     <> f/u USN 28 weeks                 section delivery  Patient is currently breastfeedingThe patient's blood type is O NEG. The baby's blood type is B POS. Rhogam is indicated.    Subjective   Minimal pain. Some nausea, has not had much PO intake. Cowan in place, not voiding or ambulating on Mg. Denies HA, scotoma, SOB, or RUQ pain. Breast feeding and baby doing well.     Objective   Allergies:   Augmentin [amoxicillin-pot clavulanate]         Last Vitals:  Temp Pulse Resp BP MAP Pulse Ox   36.8 °C (98.2 °F) 90 14 (!) 140/70 97 96 %     Vitals Min/Max Last 24 Hours:  Temp  Min: 36.7 °C (98.1 °F)  Max: 39.4 °C (102.9 °F)  Pulse  Min: 55  Max: 146  Resp  Min: 12  Max: 20  BP  Min: 76/42  Max: 176/78  MAP (mmHg)  Min: 54  Max: 123    Intake/Output:     Intake/Output Summary (Last 24 hours) at 2025 1028  Last data filed at 2025 0738  Gross per 24 hour   Intake 1570 ml   Output 2397 ml   Net -827 ml       Physical Exam:  Gen: awake, alert  Head: NCAT  HEENT: moist mucus membranes  Pulm: breathing comfortably on room air, CTA  CV: warm and well-perfused, RRR  Abd: soft, appropriately tender to palpation, no fundal tenderness, incision dressed without shadowing  : ocwan in place draining clear yellow urine  Neuro: alert and oriented  Psych: appropriate affect   Ext: 3+ weeping edema     Lab Data:  Labs in chart were reviewed.

## 2025-02-11 NOTE — ANESTHESIA PREPROCEDURE EVALUATION
Patient: Vianney Miner    Evaluation Method: In-person visit    Procedure Information    Date: 02/10/25  Procedure: Labor Consult         Relevant Problems   Neuro   (+) Depression   (+) Depression affecting pregnancy in first trimester, antepartum ([facility])      Musculoskeletal   (+) Scoliosis of lumbar spine      ID   (+) Glen's disease      GYN   (+) 37 weeks gestation of pregnancy ([facility])       Clinical information reviewed:   Tobacco  Allergies  Meds   Med Hx  Surg Hx            NPO Detail:  No data recorded     OB/GYN     Physical Exam    Airway  Mallampati: III  TM distance: <3 FB     Cardiovascular - normal exam  Rhythm: regular  Rate: normal     Dental - normal exam     Pulmonary - normal exam     Abdominal            Anesthesia Plan    History of general anesthesia?: yes  History of complications of general anesthesia?: no    ASA 2     epidural   (I informed and discussed the risks and benefits of general, spinal and epidural anesthesia with the patient.  The patient expressed her understanding and her questions were answered.  A verbal consent was given by the patient.     Pt has mild scoliosis,  no brace or instrumentation. Pt says she completed PT and has had no problems with her back.   Pt denies knowledge of Glen's disease. ETOH abuse, pt sober for 8 years. )  The patient is not a current smoker.    Anesthetic plan and risks discussed with patient.  Use of blood products discussed with patient and spouse who consented to blood products.    Plan discussed with CRNA.

## 2025-02-11 NOTE — ANESTHESIA POSTPROCEDURE EVALUATION
Patient: Vianney Miner    Procedure Summary       Date: 02/10/25 Room / Location: [facility]    Anesthesia Start:  Anesthesia Stop: 25    Procedure:  DELIVERY Diagnosis: (Fetal Intolerance of Labor)    Surgeons: Radha CARBALLO MD Responsible Provider: SEVERIANO Mohan    Anesthesia Type: epidural ASA Status: 2            Anesthesia Type: epidural    Vitals Value Taken Time   /78 25 0200   Temp 36 25 0208   Pulse 121 25 0204   Resp 22 25 0208   SpO2 94 % 25       Anesthesia Post Evaluation    Patient location during evaluation: bedside  Patient participation: complete - patient participated  Level of consciousness: awake and alert  Pain score: 0  Pain management: adequate  Multimodal analgesia pain management approach  Airway patency: patent  Cardiovascular status: acceptable  Respiratory status: acceptable  Hydration status: acceptable  Postoperative Nausea and Vomiting: none        No notable events documented.

## 2025-02-11 NOTE — SIGNIFICANT EVENT
Pt feeling a bit better, less shaky.  Does not endorse headache, vision change, RUQ pain, dyspnea, or chest pain.  Now with severe range BP 15 min apart, Labetalol 20 mg IV given  D/w pt has diagnosis of preeclampsia with severe features   Will start IV magnesium sulfate to prevent seizures  Nifedipine XR 30 mg ordered

## 2025-02-11 NOTE — CARE PLAN
Problem: Vaginal Birth or  Section  Goal: Minimal s/sx of HDP and BP<160/110  Outcome: Progressing     Problem: Safety - Adult  Goal: Free from fall injury  Outcome: Progressing     Problem: Postpartum  Goal: Minimal s/sx of HDP and BP<160/110  Outcome: Progressing  Goal: Experiences normal postpartum course  Outcome: Progressing  Goal: Appropriate maternal -  bonding  Outcome: Progressing  Goal: Establish and maintain infant feeding pattern for adequate nutrition  Outcome: Progressing  Goal: Incisions, wounds, or drain sites healing without S/S of infection  Outcome: Progressing  Goal: No s/sx infection  Outcome: Progressing  Goal: No s/sx of hemorrhage  Outcome: Progressing     Problem:  Recovery Care  Goal: Verbalizes understanding of post-op instructions  Outcome: Progressing  Goal: Patient vital signs are stable  Outcome: Progressing  Goal: Urine output is 0.5 mL/kg/hr or more  Outcome: Progressing  Goal: Fundus firm at midline  Outcome: Progressing

## 2025-02-11 NOTE — SIGNIFICANT EVENT
Pt s/p epidural  Had hypotensive episode now normotensive  FHR baseline 150 BPM minimal variability and recurrent late decelerations, oxytocin off, fluid bolus  SVE 6/100/-1 srom Meconium  Recommended urgent LTCS with non reassuring fetal heart rate remote from delivery, not responsive to resuscitative measure and meconium stained fluid  Pt and  agree, all questions answered.

## 2025-02-11 NOTE — LACTATION NOTE
Lactation Consultant Note  Lactation Consultation  Reason for Consult: Initial assessment  Consultant Name: Ryann Leal    Maternal Information  Has mother  before?: No  Infant to breast within first 2 hours of birth?: Yes  Exclusive Pump and Bottle Feed: No    Maternal Assessment  Breast Assessment: Medium, Soft, Compressible, Wide space  Nipple Assessment: Intact, Erect  Areola Assessment: Normal    Infant Assessment  Infant Behavior: Awake, Feeding cues observed, Rooting response, Sucking  Infant Assessment: Tongue protrudes over alveolar ridge    Feeding Assessment  Nutrition Source: Breastmilk  Feeding Method: Nursing at the breast  Feeding Position: Cradle, Skin to skin, Breast sandwich, Mother needs assistance with latch/positioning  Suck/Feeding: Sustained, Baby led rhythmically, Audible swallowing, Content after feeding  Latch Assessment: Minimal assistance is needed, Instructed on deep latch, Latch achieved, Wide open mouth < 160, Bursts of sucking, swallowing, and rest, Frequent audible swallows    LATCH TOOL  Latch: Grasps breast, tongue down, lips flanged, rhythmic sucking  Audible Swallowing: Spontaneous and intermittent (24 hours old)  Type of Nipple: Everted (After stimulation)  Comfort (Breast/Nipple): Soft/non-tender  Hold (Positioning): Minimal assist, teach one side, mother does other, staff holds  LATCH Score: 9    Breast Pump  Pump: None    Other OB Lactation Tools       Patient Follow-up  Inpatient Lactation Follow-up Needed : Yes  Outpatient Lactation Follow-up: Recommended    Other OB Lactation Documentation  Maternal Risk Factors:  delivery, Postpartum depression, Age over 30, primiparity, Preeclampsia, Hypertension, Extreme tiredness, fatigue or stress, Other (comment) (Rule out sepsis, hx autoimmune/chronic pain)  Infant Risk Factors: High birth weight >3600 g    Recommendations/Summary  , 39.3 weeks, Pc/s on @0056. Birthweight 3680g. TCB 1.4@4 hours.  Pre-ecclampsia, postpartum magnesium, rule out maternal sepsis. Parents endorse 4 feeds since birth, copious stool, at least 1 void. Skin to skin at time of consult, attempting to latch in cross-cradle hold. Taught cradle hold.  Taught ABC's of good positioning: arms around breast, infant belly to belly with parent, infant's curve of hip to parent's curve of body. Taught to have infant rest their chin on the breast below the areola. Parents instructed to wait for gape reflex elicited by chin pressure on the breast, before hugging infant close to facilitate latching. Parents demonstrate technique with minimal assistance from IBCLC to time latching. Infant able to latch deeply with wide gape and sustained rhythmical sucking.    Plan:  Cue based feeding, at least 8-12 times in 24 hours  Frequent skin to skin  Hand express for extra volume  Call for assistance as needed    Educated parents on skin to skin, hand expression, hunger cues and feeding frequency/patterns. Discussed expected output, weight loss <10%, and normal bilirubin. Educated on AAP pacifier recommendations. Reviewed outpatient resources.

## 2025-02-11 NOTE — L&D DELIVERY NOTE
Op Note      Pre-operative diagnosis:  39.3 wk IUP, gestational hypertension, non reassuring fetal heart tracing remote from delivery, meconium stained fluid    Post-operative diagnosis: Same      Procedure: urgent low transverse  section    Surgeons      Radha Cao MD - Primary    Resident/Fellow/Other Assistant:  Natalie     Procedure Summary  Anesthesia: Epidural ASA: II   Anesthesia Staff: CRNA: Chelita Ortega APRN-CRNA     Operative Findings: Normal appearing gravid uterus.  Grossly normal appearing fallopian tubes and ovaries.   female infant, amniotic fluid meconium, infant in cephalic presentation.  Buccal cytotec 400 micrograms and IV TXA given with mild atony.    Complications:  None; patient tolerated the procedure well.    Specimens Collected: No specimens collected    QBL:  913 mL   Urine Output: 200 mL         Procedure: Pt was taken to the OR where Epidural anesthesia was administered.  She was then placed in the dorsal supine position with a left lateral tilt. A cowan catheter was placed, SCD’s were applied, and a vaginal prep was performed. A pre-procedure time out was performed.  The pt was given prophylactic dose of IV antibiotics (Ancef and Azithromycin).  She was then prepped and draped in the usual sterile fashion. A Pfannenstiel skin incision was made with the scalpel through the skin and subcutaneous fat to the underlying fascial layer.  The fascia was incised in the midline with the scalpel and the incision was extended bilaterally. The superior aspect of the incision was grasped, tented up with Kocher clamps and the rectus muscle was dissected off. The muscles were divided in the midline, the peritoneum was then identified and entered bluntly and incision extended superiorly and inferiorly taking care to avoid underlying viscera.  The bladder blade was inserted.           A low transverse uterine incision was made with the scalpel, the uterine  cavity was entered, and the hysterotomy was extended cephalocaudally by stretching.  The infant was delivered atraumatically, the cord was clamped and cut and infant was handed off to awaiting nursing.  A cord segment and blood sample were collected.  The placenta was then expressed.  The uterus was exteriorized and cleared of all clot and debris. The uterine incision was repaired using a running locked stitch of 0-monocryl. Good hemostasis was noted.         The uterus was the placed back inside the pelvis, the gutters were cleared of all clots and debris.  The hysterotomy was again evaluated and found to be hemostatic, the underside of the fascia and bladder and the rectus muscles were also found to be hemostatic.  The fascia was closed using a running stitch of 0-PDS.  The subcutaneous layer was irrigated, small bleeders were cauterized, and the subcutaneous layer was reapproximated using a running stitch of 2-0 monocryl.  The skin was closed with 4-0 stratafix.  All counts were correct, the patient tolerated the procedure well.  Dr. Cao was present for the entire procedure.  The patient and infant were taken back to the recovery room in stable condition.    Radha Cao MD      OB Delivery Note  2025  Vianney Miner  39 y.o.   , Low Transverse       Gestational Age: 39w3d  /Para:   Quantitative Blood Loss: Admission to Discharge: 972 mL (2/10/2025 11:59 AM - 2025  7:09 AM)    Delores Jones [[id number]]      Labor Events    Sac identifier: Sac 1  Rupture date/time: 2025  Rupture type: Spontaneous  Fluid color: Meconium  Fluid odor: None  Labor type: Induced Onset of Labor  Labor allowed to proceed with plans for an attempted vaginal birth?: Yes  Induction: Misoprostol, Seaman/EASI  Induction indications: Hypertensive Disorder of Pregnancy  Complications: Fetal Intolerance       Labor Event Times    Decision date/time (emergent ):  2025 0030       Labor Length    3rd stage: 0h 03m       Placenta    Placenta delivery date/time: 2025 0059  Placenta removal: Expressed  Placenta appearance: Intact  Placenta disposition: pathology       Cord    Vessels: 3 vessels  Complications: None  Delayed cord clamping?: Yes  Cord clamped date/time: 2025 00:56:30  Cord blood disposition: Lab  Gases sent?: Yes  Stem cell collection (by provider): No       Lacerations    Episiotomy: None  Perineal laceration: None  Other lacerations?: No  Repair suture: None       Anesthesia    Method: Epidural       Operative Delivery    Forceps attempted?: No  Vacuum extractor attempted?: No       Shoulder Dystocia    Shoulder dystocia present?: No       [place] Delivery    Birth date/time: 2025 00:56:00  Delivery type: , Low Transverse   categorization: primary   priority: urgent  Indications for : Fetal Intolerance of Labor  Complications: Fetal Intolerance       Resuscitation    Method: None       Apgars    Living status: Living  Apgar Component Scores:  1 min.:  5 min.:  10 min.:  15 min.:  20 min.:    Skin color:  1  1       Heart rate:  2  2       Reflex irritability:  2  2       Muscle tone:  2  2       Respiratory effort:  2  2       Total:  9  9       Apgars assigned by: CHULA HURLEY       Delivery Providers    Delivering clinician: Radha CARBALLO MD   Provider Role    Lori Dorado RN Delivery Nurse    Chula Dugan RN Nursery Nurse     Resident                 Intrauterine Device Inserted : Radha Gonzalez MD

## 2025-02-11 NOTE — ANESTHESIA PROCEDURE NOTES
Epidural Block    Patient location during procedure: OB  Start time: 2/10/2025 11:02 PM  End time: 2/10/2025 11:29 PM  Reason for block: labor analgesia  Staffing  Performed: CRNA   Authorized by: SEVERIANO Mohan    Performed by: SEVERIANO Mohan    Preanesthetic Checklist  Completed: patient identified, IV checked, risks and benefits discussed, surgical consent, pre-op evaluation, timeout performed and sterile techniques followed  Block Timeout  RN/Licensed healthcare professional reads aloud to the Anesthesia provider and entire team: Patient identity, procedure with side and site, patient position, and as applicable the availability of implants/special equipment/special requirements.  Patient on coagulant treatment: no  Timeout performed at: 2/10/2025 11:02 PM  Block Placement  Patient position: sitting  Prep: ChloraPrep  Sterility prep: drape, gown, mask, gloves, cap and hand  Sedation level: no sedation  Patient monitoring: blood pressure, continuous pulse oximetry and heart rate  Approach: midline  Local numbing: lidocaine 1% to skin and subcutaneous tissues  Vertebral space: lumbar  Lumbar location: L3-L4  Epidural  Loss of resistance technique: saline  Guidance: landmark technique        Needle  Needle type: Tuohy   Needle gauge: 17  Needle length: 8.9cm  Needle insertion depth: 6 cm  Catheter type: multi-orifice  Catheter size: 19 G  Catheter at skin depth: 11 cm  Catheter securement method: clear occlusive dressing and liquid medical adhesive    Test dose: lidocaine 1.5% with epinephrine 1-to-200,000  Test dose: lidocaine 1.5% with epinephrine 1-to-200,000  Test dose result: no positive test dose    PCEA  Medication concentration used: 0.2% Ropivacaine with 2 mcg/mL Fentanyl  Dose (mL): 5  Lockout (minutes): 30  1-Hour Limit (boluses/hr): 25  Basal Rate: 10        Assessment  Sensory level: T10 bilateral  Block outcome: patient comfortable  Number of attempts:  1  Events: no positive test dose  Procedure assessment: patient tolerated procedure well with no immediate complications  Additional Notes  Pt acknowledges that  the risks and benefits of the epidural placement has been explained, no questions.   2325: 2ml of 1.5% Lidocaine with 1:200 K Epi  Pt in ROBB after epidural placement.

## 2025-02-11 NOTE — SIGNIFICANT EVENT
"Labor Progress Note    Subjective: feels cramping    Objective:  Vitals: BP (!) 149/95   Pulse 101   Temp 36.7 °C (98.1 °F) (Oral)   Resp 16   Ht 1.727 m (5' 8\")   Wt 101 kg (221 lb 9.6 oz)   LMP 05/11/2024   SpO2 (!) 95%   BMI 33.69 kg/m²   SVE: CRB out, 6/90/-2  FHT: Category I, baseline 140/mod variability/+accelerations/no decelerations   [place]: irregular    A/P:  gestational hypertension - no symptoms preeclampsia with severe features, HELLP labs normal  Hb 9.4 1 U PRBC crossed  CEFM, currently Category I  Epidural as requested pt desires AROM AFTER epidural    Radha Cao MD  OB/GYN    "

## 2025-02-11 NOTE — SIGNIFICANT EVENT
Pt now with temp to 39.3, will give lower oral tylenol dose since had rectal tylenol 650 mg  Sepsis protocol initiated minus fluid bolus since just had blood and now has preeclampsia with severe features   Joyce ordered  Will continue to follow closely   [FreeTextEntry2] :  bilat knee pain

## 2025-02-11 NOTE — ADDENDUM NOTE
Addendum  created 02/11/25 0324 by SEVERIANO Mohan    Flowsheet accepted, Intraprocedure Event deleted, Intraprocedure Event edited

## 2025-02-11 NOTE — SIGNIFICANT EVENT
"Pt severely rigoring in bed.  No dyspnea/chest pain/nausea/lightheadedness    BP (!) 162/69   Pulse (!) 140   Temp 37.4 °C (99.3 °F) (Axillary)   Resp 18   Ht 1.727 m (5' 8\")   Wt 101 kg (221 lb 9.6 oz)   LMP 05/11/2024   SpO2 97%   BMI 33.69 kg/m²      Abdomen softly distended, fundus firm NT below umb  No vaginal bleeding    Starting Hb 9.4  will start 1 UNIT PRBC IV  CBC/fibrinogen/coags ordered STAT    Pt starting to feel a bit better, will continue to observe closely.  ECG ordered also  "

## 2025-02-11 NOTE — CARE PLAN
The patient's goals for the shift include have a baby    The clinical goals for the shift include reassuring fetal and maternal status      Problem: Vaginal Birth or  Section  Goal: Fetal and maternal status remain reassuring during the birth process  Outcome: Progressing  Flowsheets (Taken 2/10/2025 1959)  Fetal and maternal status remain reassuring during the birth process:   Monitor vital signs   Monitor fetal heart rate   Monitor uterine activity   Monitor labor progression (Vaginal delivery)   Deep vein thrombosis prophylaxis   Med administration/monitoring of effect  Goal: Minimal s/sx of HDP and BP<160/110  Outcome: Progressing  Flowsheets (Taken 2/10/2025 1959)  Minimal s/sx of HDP and BP <160/110:   Med administration/monitoring of effect   Monitor QBL and vital signs     Problem: Safety - Adult  Goal: Free from fall injury  Outcome: Progressing  Flowsheets (Taken 2/10/2025 1959)  Free from fall injury:   Instruct family/caregiver on patient safety   Based on caregiver fall risk screen, instruct family/caregiver to ask for assistance with transferring infant if caregiver noted to have fall risk factors

## 2025-02-12 LAB
ERYTHROCYTE [DISTWIDTH] IN BLOOD BY AUTOMATED COUNT: 15.7 % (ref 11.5–14.5)
FETAL MATERNAL SCREEN: NORMAL
HCT VFR BLD AUTO: 32.3 % (ref 36–46)
HGB BLD-MCNC: 9.7 G/DL (ref 12–16)
MCH RBC QN AUTO: 26.3 PG (ref 26–34)
MCHC RBC AUTO-ENTMCNC: 30 G/DL (ref 32–36)
MCV RBC AUTO: 88 FL (ref 80–100)
NRBC BLD-RTO: 0 /100 WBCS (ref 0–0)
PLATELET # BLD AUTO: 317 X10*3/UL (ref 150–450)
RBC # BLD AUTO: 3.69 X10*6/UL (ref 4–5.2)
WBC # BLD AUTO: 19.5 X10*3/UL (ref 4.4–11.3)

## 2025-02-12 PROCEDURE — 99199 UNLISTED SPECIAL SVC PX/RPRT: CPT

## 2025-02-12 PROCEDURE — 2500000002 HC RX 250 W HCPCS SELF ADMINISTERED DRUGS (ALT 637 FOR MEDICARE OP, ALT 636 FOR OP/ED): Performed by: STUDENT IN AN ORGANIZED HEALTH CARE EDUCATION/TRAINING PROGRAM

## 2025-02-12 PROCEDURE — 2500000002 HC RX 250 W HCPCS SELF ADMINISTERED DRUGS (ALT 637 FOR MEDICARE OP, ALT 636 FOR OP/ED): Performed by: OBSTETRICS & GYNECOLOGY

## 2025-02-12 PROCEDURE — 1220000001 HC OB SEMI-PRIVATE ROOM DAILY

## 2025-02-12 PROCEDURE — 2500000001 HC RX 250 WO HCPCS SELF ADMINISTERED DRUGS (ALT 637 FOR MEDICARE OP): Performed by: OBSTETRICS & GYNECOLOGY

## 2025-02-12 PROCEDURE — 36415 COLL VENOUS BLD VENIPUNCTURE: CPT | Performed by: STUDENT IN AN ORGANIZED HEALTH CARE EDUCATION/TRAINING PROGRAM

## 2025-02-12 PROCEDURE — 2500000004 HC RX 250 GENERAL PHARMACY W/ HCPCS (ALT 636 FOR OP/ED): Performed by: OBSTETRICS & GYNECOLOGY

## 2025-02-12 PROCEDURE — 85027 COMPLETE CBC AUTOMATED: CPT | Performed by: STUDENT IN AN ORGANIZED HEALTH CARE EDUCATION/TRAINING PROGRAM

## 2025-02-12 RX ADMIN — ENOXAPARIN SODIUM 40 MG: 40 INJECTION SUBCUTANEOUS at 17:04

## 2025-02-12 RX ADMIN — ACETAMINOPHEN 975 MG: 325 TABLET ORAL at 17:04

## 2025-02-12 RX ADMIN — BUPROPION HYDROCHLORIDE 150 MG: 150 TABLET, EXTENDED RELEASE ORAL at 12:14

## 2025-02-12 RX ADMIN — POLYETHYLENE GLYCOL 3350 17 G: 17 POWDER, FOR SOLUTION ORAL at 21:34

## 2025-02-12 RX ADMIN — ACETAMINOPHEN 975 MG: 325 TABLET ORAL at 22:57

## 2025-02-12 RX ADMIN — SERTRALINE HYDROCHLORIDE 25 MG: 25 TABLET ORAL at 08:52

## 2025-02-12 RX ADMIN — IBUPROFEN 600 MG: 600 TABLET, FILM COATED ORAL at 11:01

## 2025-02-12 RX ADMIN — OXYCODONE HYDROCHLORIDE 5 MG: 5 TABLET ORAL at 08:55

## 2025-02-12 RX ADMIN — ACETAMINOPHEN 975 MG: 325 TABLET ORAL at 04:22

## 2025-02-12 RX ADMIN — IBUPROFEN 600 MG: 600 TABLET, FILM COATED ORAL at 04:22

## 2025-02-12 RX ADMIN — IBUPROFEN 600 MG: 600 TABLET, FILM COATED ORAL at 17:04

## 2025-02-12 RX ADMIN — ACETAMINOPHEN 975 MG: 325 TABLET ORAL at 11:01

## 2025-02-12 RX ADMIN — NIFEDIPINE 60 MG: 60 TABLET, FILM COATED, EXTENDED RELEASE ORAL at 08:13

## 2025-02-12 RX ADMIN — IBUPROFEN 600 MG: 600 TABLET, FILM COATED ORAL at 22:57

## 2025-02-12 ASSESSMENT — PAIN DESCRIPTION - DESCRIPTORS
DESCRIPTORS: SORE

## 2025-02-12 ASSESSMENT — PAIN SCALES - GENERAL
PAINLEVEL_OUTOF10: 0 - NO PAIN
PAINLEVEL_OUTOF10: 0 - NO PAIN
PAINLEVEL_OUTOF10: 1
PAINLEVEL_OUTOF10: 0 - NO PAIN
PAINLEVEL_OUTOF10: 3
PAINLEVEL_OUTOF10: 0 - NO PAIN

## 2025-02-12 NOTE — CARE PLAN
The patient's goals for the shift include Bond with baby    The clinical goals for the shift include Maintain stable BP      Problem: Safety - Adult  Goal: Free from fall injury  Outcome: Progressing  Flowsheets (Taken 2/10/2025 1959 by Aria Lee RN)  Free from fall injury:   Instruct family/caregiver on patient safety   Based on caregiver fall risk screen, instruct family/caregiver to ask for assistance with transferring infant if caregiver noted to have fall risk factors     Problem: Postpartum  Goal: Experiences normal postpartum course  Outcome: Progressing  Flowsheets (Taken 2025)  Experiences normal postpartum course:   Monitor maternal vital signs   Med administration/monitoring of effect   Assess uterine involution   Fundal and lochia asssessments w/fundal massage, bladder emptying  Goal: Appropriate maternal -  bonding  Outcome: Progressing  Flowsheets (Taken 2025)  Appropriate maternal- bonding:   Identify family support   24-hour rooming in     Problem:  Recovery Care  Goal: Verbalizes understanding of post-op instructions  Outcome: Progressing  Flowsheets (Taken 2025)  Patient verbalizes understanding of post-op teaching: Provide post-op teaching  Goal: Patient vital signs are stable  Outcome: Progressing  Flowsheets (Taken 2025)  Vital signs are medically acceptable:   Assess vital signs   Turn, cough, and deep breathe     Problem: Hypertensive Disorder of Pregnancy (HDP)  Goal: Minimal s/sx of HDP and BP<160/110  Outcome: Progressing  Flowsheets (Taken 2025)  Minimal s/sx of HDP and BP <160/110:   Med administration/monitoring of effect   Monitor for s/sx of worsening HDP     Problem: Discharge Planning  Goal: Discharge to home or other facility with appropriate resources  Outcome: Progressing  Flowsheets (Taken 2025)  Discharge to home or other facility with appropriate resources: Identify barriers to discharge  with patient and caregiver

## 2025-02-12 NOTE — LACTATION NOTE
Lactation Consultant Note  Lactation Consultation  Reason for Consult: Follow-up assessment  Consultant Name: Ryann Leal    Maternal Information  Has mother  before?: No  Infant to breast within first 2 hours of birth?: Yes  Exclusive Pump and Bottle Feed: No    Maternal Assessment  Breast Assessment: Medium, Wide space, Soft, Compressible  Nipple Assessment: Intact, Erect, Rounded after feeding  [place] Assessment: Normal    Infant Assessment  Infant Behavior: Awake, Feeding cues observed, Rooting response, Sucking  Infant Assessment: Tongue protrudes over alveolar ridge, Good cupping of tongue    Feeding Assessment  Nutrition Source: Breastmilk  Feeding Method: Nursing at the breast  Feeding Position: Cradle, Skin to skin, Breast sandwich, Mother demonstrates good positioning, Mother needs assistance with latch/positioning  Suck/Feeding: Sustained, Baby led rhythmically, Audible swallowing  Latch Assessment: Minimal assistance is needed, Instructed on deep latch, Latch achieved, Wide open mouth < 160, Bursts of sucking, swallowing, and rest    LATCH TOOL  Latch: Grasps breast, tongue down, lips flanged, rhythmic sucking  Audible Swallowing: Spontaneous and intermittent (24 hours old)  Type of Nipple: Everted (After stimulation)  Comfort (Breast/Nipple): Soft/non-tender  Hold (Positioning): Minimal assist, teach one side, mother does other, staff holds  LATCH Score: 9    Breast Pump  Pump: Hospital grade electric pump, Hand expression  Frequency: 3-4 times per day  Duration: 15-20 minutes per session  Breast Shield Size and Type: 21 mm  Units of Volume: Drops    Other OB Lactation Tools       Patient Follow-up  Inpatient Lactation Follow-up Needed : Yes  Outpatient Lactation Follow-up: Recommended    Other OB Lactation Documentation  Maternal Risk Factors:  delivery, Postpartum depression, Age over 30, primiparity, Preeclampsia, Hypertension, Extreme tiredness, fatigue or stress, Other (comment)  (Rule out sepsis, hx autoimmune/chronic pain)  Infant Risk Factors: High birth weight >3600 g    Recommendations/Summary  See previous note for history. 7.3% weight loss (90th % on NEWT), TCB 6.8@28 hours. Parents report infant has been latching well, concerned about weight loss, uncertain if infant is getting anything. Infant has good output, TCB WNL. Parents endorse difficulty getting latched on right side. Reviewed ABCs of deep latching and good positioning. Infant latched easily to left side. Switched to right side with good positioning. Infant latched easily with wide gape, flanged lips, bursts of sucking, swallowing, and rest. Mother endorses latch comfortable.   Discussed weight loss and medical history. Given option to begin insurance pumping to bring in milk supply. Parents receptive, would like to pump. Taught setup, use on initiate setting, and cleaning of pump. Reviewed need to pump for 15min 4-6 times per day. Any pumped milk to be given by spoon, cup, or oral syringe.     Plan:  Cue based feeding, at least 8-12 times in 24 hours  Frequent skin to skin  Hand express for extra volume  Pump after every other feed, about 4-6x per day, for 15min   Call for assistance as needed    Follow up, 1830: Infant weighed, 3340g, 9.25% weight loss, 90th % on NEWT. Discussed with Dr. Johansen and parents, decision made to supplement with DHM 10-15mL q 3 hours overnight. Taught parents finger feeding and supplementing at breast. Demonstrated finger feeding. Infant tolerated well, took 8mL then content.  Plan:  -Feed on demand, at least 8 times in 24 hours  -Latch at breast first   -Supplement with 10-15mL DHM via finger feed or at breast  -Pump after feeds at least 4-6 times per day  -Frequent skin to skin  -Call for assistance as needed    Reviewed POC with parents. Verbalized understanding and comfort with POC. Will re-evaluate need for supplementation in morning.

## 2025-02-12 NOTE — PROGRESS NOTES
Postpartum Progress Note    Assessment/Plan   Vianney Miner is a 39 y.o.,  who delivered at 39w3d gestation via primary  section due to persistent category II tracing and remote from delivery in the setting of meconium-stained fluid.  She is now postpartum day 1.    - Continue routine postpartum care  - Post-op hemoglobin 9.7   - Preeclampsia with severe features - s/p magnesium course.  Bps well controlled on nifedipine XL 30mg daily.  Bps have been WNL for the past 24 hours.   - Positive sepsis trigger. Pt afebrile >24 hours and now s/p zosyn.  WBC decreasing.   - Breast feeding.  Going well overall.   - Female infant.  Doing well.   - Rh negative.  Infant is Rh+.  Rhogam prior to discharge.  - Discharge home on POD 2-3 pending post-operative milestones  - Follow up within 5 days for blood pressure check and incision check after discharge     Lab Results   Component Value Date    LABRH NEG 02/10/2025     Lab Results   Component Value Date    RUBIG Positive 2024       Assessment & Plan  Gestational HTN, third trimester ([facility])    Scoliosis of lumbar spine    Pregnancy Problems (from 24 to present)       Problem Noted Diagnosed Resolved    Gestational HTN, third trimester ([facility]) 2/10/2025 by Josephine Elias MD  No    Priority:  Medium       37 weeks gestation of pregnancy ([facility]) 2024 by Caryn Cooper MD  No    Priority:  Medium       Overview Addendum 2025 10:23 AM by Caryn Cooper MD     ** Previa RESOLVED on follow up anatomic USN  - Repeat USN 31.2 - EFW 54% with no evidence of previa     Desired provider in labor: [] CNM  [x] Physician  [x] Blood Products: [x] Yes, accepts [] No, needs counseling  [x] Initial BMI: 22.81   [x] Prenatal Labs:   [x] Cervical Cancer Screening up to date- 2022 - nml, negative HPV   [x] Rh status: Negative   [x] Genetic Screening:  RR NIPS - It's a GIRL   [x] NT US: (11-13 wks): WNL   [x] Baby ASA (if indicated):  started 24  [x] Pregnancy dated by:  LMP c/w USN at 7 weeks     [x] Anatomy US: (19-20 wks): WNL, had normal fetal echo   [x] 1hr GCT at 24-28wks: elevated but normal 3 hour GTT   [x] Rhogam (if indicated): given    [x] Tdap: given    [x] RSV (32-36 wks): given 1/3  [x] Flu Vaccine: Given   [x] Updated COVID vaccine recommended     [x] Breastfeeding: has breast pump   [x] Postpartum Birth control method: partner planning vasectomy; may also elect for interval Mirena (endometriosis)   [x] GBS at 36 - 37 wks: negative   [x] 39 weeks discussion of IOL vs. Expectant management: IOL scheduled for  at 8pm   [x] Mode of delivery ( anticipated ):          Encounter for supervision of high risk pregnancy due to advanced maternal age in primigravida 2024 by Caryn Cooper MD  No    Priority:  Medium       Overview Addendum 2024  1:43 PM by Caryn Cooper MD     - RR NIPS  - Taking prophylactic ASA          Depression affecting pregnancy in first trimester, antepartum ([facility]) 2024 by Caryn Cooper MD  No    Priority:  Medium       Overview Addendum 2024  2:32 PM by Caryn Cooper MD     - on wellbutrin 150mg daily and zoloft 25mg daily   - managed by PCP          Complete placenta previa nos or without hemorrhage, unspecified trimester ([facility]) 10/2/2024 by Caryn Cooper MD  10/18/2024 by Caryn Cooper MD    Priority:  Medium       Overview Signed 10/2/2024  5:04 PM by Caryn Cooper MD     <> f/u USN 28 weeks                Hospital course:  preeclampsia with severe features     Subjective   Pt doing well after delivery.  Moderate lochia. Ambulating and voiding without issue.  Denies nausea/vomiting.  She is passing flatus. Her pain is well controlled overall.  She is taking narcotic pain medication. Breastfeeding is going well overall.     Objective   Allergies:   Augmentin [amoxicillin-pot clavulanate]         Last Vitals:  BP  "130/80   Pulse 83   Temp 36.7 °C (98 °F) (Oral)   Resp 18   Ht 1.727 m (5' 8\")   Wt 101 kg (221 lb 9.6 oz)   LMP 05/11/2024   SpO2 96%   Breastfeeding Yes   BMI 33.69 kg/m²     Physical Exam:  General: Examination reveals a well developed, well nourished, female, in no acute distress. She is alert and cooperative.  HEENT: PERRLA.  Lungs: Respirations unlabored  Abdomen: Soft, nondistended, appropriately tender to palpation for post-operative course, no rebound or guarding   Incision:  dressing in place - c/d/I   Extremities: no redness or tenderness in the calves or thighs, no edema  Psychological: awake and alert; oriented to person, place, and time    Lab Data:  Lab Results   Component Value Date    HGB 9.7 (L) 02/12/2025    HGB 10.0 (L) 02/11/2025       "

## 2025-02-12 NOTE — CARE PLAN
The patient's goals for the shift include bond with baby    The clinical goals for the shift include maintain stable blood pressure

## 2025-02-12 NOTE — PROGRESS NOTES
Vianney Miner is a 39 y.o., , who had a , Low Transverse delivery on 2025 at 39w3d and is now POD0.    She had Neuraxial Anesthesia without immediate complications noted.       Pain well controlled    Vitals:    25 2148   BP: 128/69   Pulse: 93   Resp: 16   Temp: 36.7   SpO2: 96%       Neuraxial site assessed. No visible redness or swelling or drainage. Patient able to move all extremities without difficulty. Is currently on Mag. Infusion, remains on bedrest with urinary catheter. No complaints of nausea/vomiting. Tolerating PO intake well. No s/sx of PDPH.     Anesthesia will continue to follow patient     Zo Clayton, APRN-CRNA

## 2025-02-12 NOTE — ANESTHESIA POSTPROCEDURE EVALUATION
Patient: Vianney Miner    Procedure Summary       Date: 02/10/25 Room / Location: [facility]    Anesthesia Start:  Anesthesia Stop: 25    Procedure:  DELIVERY Diagnosis: (Fetal Intolerance of Labor)    Surgeons: Radha CARBALLO MD Responsible Provider: SEVERIANO Mohan    Anesthesia Type: epidural ASA Status: 2            Anesthesia Type: epidural    Vitals Value Taken Time   /84 25 0359   Temp 39.4 °C (102.9 °F) 25 0329   Pulse 115 25 0400   Resp 18 25 0329   SpO2 91 % 25 0400       Anesthesia Post Evaluation    Patient location during evaluation: bedside  Patient participation: complete - patient participated  Level of consciousness: awake and alert  Pain management: adequate  Airway patency: patent  Cardiovascular status: acceptable  Respiratory status: acceptable and room air  Hydration status: acceptable  Postoperative Nausea and Vomiting: none  Comments: Epidural catheter removed by nursing. No redness, swelling, or drainage at puncture site.    Complete resolution of numbness. Patient is able to lift legs, bend at the knees, and ambulate.    Patient denies problems with urination.    Patient denies nausea, headache or severe back pain.         No notable events documented.

## 2025-02-12 NOTE — NURSING NOTE
0350: Seaman removed. Pt tolerated well. Pt IV to heplock at this time. Pt Standing at bedside. Tolerated well. Ambulated to BR with assist. Wanted to attempt to void. Unable at this time. Pt ambulating to chair.  to breast.

## 2025-02-12 NOTE — PROGRESS NOTES
Spiritual Care Visit  Spiritual Care Request    Reason for Visit:  Routine Visit: Introduction     Request Received From:       Focus of Care:  Visited With: Patient         Refer to :          Spiritual Care Assessment    Spiritual Assessment:                      Care Provided:  Intended Effects: Promote sense of peace, Preserve dignity and respect, Meaning-making  Methods: Offer spiritual/Roman Catholic support  Interventions: Share words of hope and inspiration, Holden, Provide a Roman Catholic item(s)    Sense of Community and or Jewish Affiliation:  Yazdanism         Addressed Needs/Concerns and/or Ines Through:          Outcome:        Advance Directives:         Spiritual Care Annotation    Annotation:  Patient was with her mom and new baby.   gave her a baby keepsake frame and prayed at her request.

## 2025-02-13 LAB
LABORATORY COMMENT REPORT: NORMAL
PATH REPORT.FINAL DX SPEC: NORMAL
PATH REPORT.GROSS SPEC: NORMAL
PATH REPORT.RELEVANT HX SPEC: NORMAL
PATH REPORT.TOTAL CANCER: NORMAL

## 2025-02-13 PROCEDURE — 2500000004 HC RX 250 GENERAL PHARMACY W/ HCPCS (ALT 636 FOR OP/ED): Performed by: OBSTETRICS & GYNECOLOGY

## 2025-02-13 PROCEDURE — 2500000001 HC RX 250 WO HCPCS SELF ADMINISTERED DRUGS (ALT 637 FOR MEDICARE OP): Performed by: OBSTETRICS & GYNECOLOGY

## 2025-02-13 PROCEDURE — 2500000002 HC RX 250 W HCPCS SELF ADMINISTERED DRUGS (ALT 637 FOR MEDICARE OP, ALT 636 FOR OP/ED): Performed by: STUDENT IN AN ORGANIZED HEALTH CARE EDUCATION/TRAINING PROGRAM

## 2025-02-13 PROCEDURE — 1220000001 HC OB SEMI-PRIVATE ROOM DAILY

## 2025-02-13 PROCEDURE — 2500000002 HC RX 250 W HCPCS SELF ADMINISTERED DRUGS (ALT 637 FOR MEDICARE OP, ALT 636 FOR OP/ED): Performed by: OBSTETRICS & GYNECOLOGY

## 2025-02-13 RX ORDER — FUROSEMIDE 20 MG/1
20 TABLET ORAL DAILY
Status: DISCONTINUED | OUTPATIENT
Start: 2025-02-13 | End: 2025-02-14 | Stop reason: HOSPADM

## 2025-02-13 RX ORDER — NIFEDIPINE 30 MG/1
30 TABLET, FILM COATED, EXTENDED RELEASE ORAL ONCE
Status: DISCONTINUED | OUTPATIENT
Start: 2025-02-13 | End: 2025-02-14 | Stop reason: HOSPADM

## 2025-02-13 RX ADMIN — NIFEDIPINE 60 MG: 60 TABLET, FILM COATED, EXTENDED RELEASE ORAL at 08:20

## 2025-02-13 RX ADMIN — ACETAMINOPHEN 975 MG: 325 TABLET ORAL at 05:00

## 2025-02-13 RX ADMIN — IBUPROFEN 600 MG: 600 TABLET, FILM COATED ORAL at 18:30

## 2025-02-13 RX ADMIN — ACETAMINOPHEN 975 MG: 325 TABLET ORAL at 12:32

## 2025-02-13 RX ADMIN — SERTRALINE HYDROCHLORIDE 25 MG: 25 TABLET ORAL at 08:20

## 2025-02-13 RX ADMIN — FUROSEMIDE 20 MG: 20 TABLET ORAL at 19:46

## 2025-02-13 RX ADMIN — ENOXAPARIN SODIUM 40 MG: 40 INJECTION SUBCUTANEOUS at 19:46

## 2025-02-13 RX ADMIN — IBUPROFEN 600 MG: 600 TABLET, FILM COATED ORAL at 12:32

## 2025-02-13 RX ADMIN — ACETAMINOPHEN 975 MG: 325 TABLET ORAL at 18:30

## 2025-02-13 RX ADMIN — BUPROPION HYDROCHLORIDE 150 MG: 150 TABLET, EXTENDED RELEASE ORAL at 08:21

## 2025-02-13 RX ADMIN — IBUPROFEN 600 MG: 600 TABLET, FILM COATED ORAL at 05:00

## 2025-02-13 ASSESSMENT — PAIN SCALES - GENERAL
PAINLEVEL_OUTOF10: 4
PAINLEVEL_OUTOF10: 3
PAINLEVEL_OUTOF10: 0 - NO PAIN

## 2025-02-13 NOTE — CARE PLAN
The patient's goals for the shift include bond with baby    The clinical goals for the shift include BP remains below severe range    Problem: Safety - Adult  Goal: Free from fall injury  Outcome: Progressing  Flowsheets (Taken 2025)  Free from fall injury: Instruct family/caregiver on patient safety     Problem: Postpartum  Goal: Experiences normal postpartum course  Outcome: Progressing  Flowsheets (Taken 2025)  Experiences normal postpartum course:   Monitor maternal vital signs   Assess uterine involution  Goal: Appropriate maternal -  bonding  Outcome: Progressing  Flowsheets (Taken 2025)  Appropriate maternal- bonding:   Identify family support   24-hour rooming in     Problem: Hypertensive Disorder of Pregnancy (HDP)  Goal: Minimal s/sx of HDP and BP<160/110  Outcome: Progressing  Flowsheets (Taken 2025)  Minimal s/sx of HDP and BP <160/110:   Med administration/monitoring of effect   Monitor for s/sx of worsening HDP     Problem: Discharge Planning  Goal: Discharge to home or other facility with appropriate resources  Outcome: Progressing  Flowsheets (Taken 2025)  Discharge to home or other facility with appropriate resources: Identify barriers to discharge with patient and caregiver

## 2025-02-13 NOTE — PROGRESS NOTES
"Social Work Brief Note     Patient: Vianney Miner   [place] Name: Mela Miner    [place] : 25      Reason for Visit: Support       History: Vianney Miner presented to [facility] 2/10/25 for delivery of her first child, delivered term baby girl on 25 via unanticipated , and is anticipating discharge to home on 25 pending blood pressures.       Assessment:  was able to review chart and spoke with OB nurse.  identified/informed of Mrs. Curtis Miner with history of  depression and prescribed Zoloft and Wellbutrin. Informed Mrs. Curtis Miner appears to be doing well.      was able to meet with Mrs. Curtis Miner and her spouse as well as her parents to introduce self and provide support and assistance as may be needed at this time. All receptive. Mrs. Curtis Miner stated having experienced difficult labor/delivery and of \"crying a lot.\"   acknowledged Mrs. Curtis Miner's history of  mental health which she confirmed and stated being followed by PCP and prescribed medication which she finds to be effective.  spoke about baby blues, PPD, and paternal  depression and provided information about PPD, paternal  depression, and support resources available if needed.    answered questions about birth certificate and social security card and reminded to add child to medical coverage within 30 days.  Mrs. Curtis Miner's parents present and stated having been in town for past few weeks with plan to be here another couple of weeks to assist. Other family members have been present through admission.    No additional questions or needs at this time. Support provided and self-care encouraged.       Plan:   will remain available if/as needed through remainder of admission.       Signature:  FRED Basilio   "

## 2025-02-13 NOTE — CARE PLAN
The patient's goals for the shift include bond with baby    The clinical goals for the shift include BP remains below severe range    Over the shift, the patient did make progress toward the following goals.

## 2025-02-13 NOTE — PROGRESS NOTES
Postpartum Progress Note    Assessment/Plan   Vianney Miner is a 39 y.o., , who delivered at 39w3d gestation    Now PPD#2 s/p , Low Transverse on 2025    Post-operative  - Continue routine postpartum care  - Pain well controlled on po medications  - DVT risk score DVT Score (IF A SCORE IS NOT CALCULATING, MUST SELECT A BMI TO COMPLETE): 6 , ppx with lovenox  - Hb    9.9 --> 9.7   , acute blood loss anemia, asymptomatic, appropriate for blood loss also had 1 unit pRBC during tachycardic/febrile episode post op  Results from last 7 days   Lab Units 25  0651 25  1513 25  0352   WBC AUTO x10*3/uL 19.5* 24.9* 23.4*   HEMOGLOBIN g/dL 9.7* 10.0* 9.9*   PLATELETS AUTO x10*3/uL 317 303 306   MCV fL 88 87 88         Postpartum  Rh  negative,  baby positive, s/p rhogam  rubella immune  Breastfeeding, lactation consult    preeclampsia with severe features   -diagnosed by severe range bP requiring IV treatment  -nife increased to 90 xR daily  -will add furosemide 20 mg daily times 5 d as +3 le edema and hypertension  -anticipate discharge tmw if normotensive    H/o depression/anxiety  -on wellbutrin/zoloft feels well, did have check in with SW today    Sepsis trigger  -s/p 24 hr zosyn, no sx's of infection          Radha Cao MD     Assessment & Plan  Gestational HTN, third trimester ([facility])    Scoliosis of lumbar spine    Pregnancy Problems (from 24 to present)       Problem Noted Diagnosed Resolved    Gestational HTN, third trimester ([facility]) 2/10/2025 by Josephine Elias MD  No    Priority:  Medium       37 weeks gestation of pregnancy ([facility]) 2024 by Caryn Cooper MD  No    Priority:  Medium       Overview Addendum 2025 10:23 AM by Caryn Cooper MD     ** Previa RESOLVED on follow up anatomic USN  - Repeat USN 31.2 - EFW 54% with no evidence of previa     Desired provider in labor: [] CNM  [x] Physician  [x] Blood Products: [x]  Yes, accepts [] No, needs counseling  [x] Initial BMI: 22.81   [x] Prenatal Labs:   [x] Cervical Cancer Screening up to date- 2022 - nml, negative HPV   [x] Rh status: Negative   [x] Genetic Screening:  RR NIPS - It's a GIRL   [x] NT US: (11-13 wks): WNL   [x] Baby ASA (if indicated): started 24  [x] Pregnancy dated by:  LMP c/w USN at 7 weeks     [x] Anatomy US: (19-20 wks): WNL, had normal fetal echo   [x] 1hr GCT at 24-28wks: elevated but normal 3 hour GTT   [x] Rhogam (if indicated): given    [x] Tdap: given    [x] RSV (32-36 wks): given 1/3  [x] Flu Vaccine: Given   [x] Updated COVID vaccine recommended     [x] Breastfeeding: has breast pump   [x] Postpartum Birth control method: partner planning vasectomy; may also elect for interval Mirena (endometriosis)   [x] GBS at 36 - 37 wks: negative   [x] 39 weeks discussion of IOL vs. Expectant management: IOL scheduled for  at 8pm   [x] Mode of delivery ( anticipated ):          Encounter for supervision of high risk pregnancy due to advanced maternal age in primigravida 2024 by Caryn Cooper MD  No    Priority:  Medium       Overview Addendum 2024  1:43 PM by Caryn Cooper MD     - RR NIPS  - Taking prophylactic ASA          Depression affecting pregnancy in first trimester, antepartum ([facility]) 2024 by Caryn Cooper MD  No    Priority:  Medium       Overview Addendum 2024  2:32 PM by Caryn Cooper MD     - on wellbutrin 150mg daily and zoloft 25mg daily   - managed by PCP          Complete placenta previa nos or without hemorrhage, unspecified trimester ([facility]) 10/2/2024 by Caryn Cooper MD  10/18/2024 by Caryn Cooper MD    Priority:  Medium       Overview Signed 10/2/2024  5:04 PM by Caryn Cooper MD     <> f/u USN 28 weeks                   section delivery  Patient is currently breastfeedingThe patient's blood type is O NEG. The baby's blood type is B  POS. Rhogam indicated and given.    Subjective      Vianney Miner is PPD#2 s/p  urgent LTCS for NRFHT/preeclampsia with severe features who reports feeling overall well.  No acute events overnight.  Voiding spontaneously, passing flatus.  Pain well controlled on PO meds.  Light lochia. Tolerating diet.   Does not endorse headache, vision change, RUQ pain, dyspnea, or chest pain.  Does not endorse lightheadedness or palpitations or dyspnea.      Objective   Allergies:   Augmentin [amoxicillin-pot clavulanate]         Last Vitals:  Temp Pulse Resp BP MAP Pulse Ox   37.1 °C (98.8 °F) 97 19 (!) 150/95   99 %     Vitals Min/Max Last 24 Hours:  Temp  Min: 36.6 °C (97.9 °F)  Max: 37.1 °C (98.8 °F)  Pulse  Min: 85  Max: 101  Resp  Min: 16  Max: 19  BP  Min: 134/82  Max: 150/95    Intake/Output:   No intake or output data in the 24 hours ending 25    Physical Exam:  General: examination reveals a well developed, well nourished, female, in no acute distress. She is alert and cooperative.  HEENT: external ears normal. Nose normal, no erythema or discharge.  Abdominal: soft, fundus firm, below umbilicus, nontender  Incision: mepilex dressing intact  Extremities: no redness or tenderness in the calves or thighs, +3 edema.  Neurological: alert, oriented, normal speech, no focal findings or movement disorder noted.

## 2025-02-14 ENCOUNTER — PHARMACY VISIT (OUTPATIENT)
Dept: PHARMACY | Facility: CLINIC | Age: 40
End: 2025-02-14
Payer: COMMERCIAL

## 2025-02-14 VITALS
SYSTOLIC BLOOD PRESSURE: 138 MMHG | DIASTOLIC BLOOD PRESSURE: 81 MMHG | BODY MASS INDEX: 33.59 KG/M2 | TEMPERATURE: 98.4 F | HEART RATE: 106 BPM | OXYGEN SATURATION: 97 % | RESPIRATION RATE: 18 BRPM | WEIGHT: 221.6 LBS | HEIGHT: 68 IN

## 2025-02-14 LAB
BLOOD EXPIRATION DATE: NORMAL
BLOOD EXPIRATION DATE: NORMAL
DISPENSE STATUS: NORMAL
DISPENSE STATUS: NORMAL
PRODUCT BLOOD TYPE: 9500
PRODUCT BLOOD TYPE: 9500
PRODUCT CODE: NORMAL
PRODUCT CODE: NORMAL
UNIT ABO: NORMAL
UNIT ABO: NORMAL
UNIT NUMBER: NORMAL
UNIT NUMBER: NORMAL
UNIT RH: NORMAL
UNIT RH: NORMAL
UNIT VOLUME: 315
UNIT VOLUME: 319
XM INTEP: NORMAL
XM INTEP: NORMAL

## 2025-02-14 PROCEDURE — 2500000002 HC RX 250 W HCPCS SELF ADMINISTERED DRUGS (ALT 637 FOR MEDICARE OP, ALT 636 FOR OP/ED): Performed by: OBSTETRICS & GYNECOLOGY

## 2025-02-14 PROCEDURE — RXMED WILLOW AMBULATORY MEDICATION CHARGE

## 2025-02-14 PROCEDURE — 2500000001 HC RX 250 WO HCPCS SELF ADMINISTERED DRUGS (ALT 637 FOR MEDICARE OP): Performed by: OBSTETRICS & GYNECOLOGY

## 2025-02-14 RX ORDER — NIFEDIPINE 90 MG/1
90 TABLET, EXTENDED RELEASE ORAL DAILY
Qty: 30 TABLET | Refills: 1 | Status: SHIPPED | OUTPATIENT
Start: 2025-02-15

## 2025-02-14 RX ORDER — ACETAMINOPHEN 500 MG
1000 TABLET ORAL EVERY 6 HOURS PRN
Qty: 90 TABLET | Refills: 0 | Status: SHIPPED | OUTPATIENT
Start: 2025-02-14

## 2025-02-14 RX ORDER — IBUPROFEN 600 MG/1
600 TABLET ORAL EVERY 6 HOURS PRN
Qty: 60 TABLET | Refills: 0 | Status: SHIPPED | OUTPATIENT
Start: 2025-02-14

## 2025-02-14 RX ORDER — FUROSEMIDE 20 MG/1
20 TABLET ORAL DAILY
Qty: 3 TABLET | Refills: 0 | Status: SHIPPED | OUTPATIENT
Start: 2025-02-15 | End: 2025-02-18

## 2025-02-14 RX ADMIN — IBUPROFEN 600 MG: 600 TABLET, FILM COATED ORAL at 13:00

## 2025-02-14 RX ADMIN — SERTRALINE HYDROCHLORIDE 25 MG: 25 TABLET ORAL at 08:59

## 2025-02-14 RX ADMIN — BUPROPION HYDROCHLORIDE 150 MG: 150 TABLET, EXTENDED RELEASE ORAL at 08:59

## 2025-02-14 RX ADMIN — ACETAMINOPHEN 975 MG: 325 TABLET ORAL at 05:59

## 2025-02-14 RX ADMIN — ACETAMINOPHEN 975 MG: 325 TABLET ORAL at 13:00

## 2025-02-14 RX ADMIN — ACETAMINOPHEN 975 MG: 325 TABLET ORAL at 00:34

## 2025-02-14 RX ADMIN — NIFEDIPINE 90 MG: 60 TABLET, FILM COATED, EXTENDED RELEASE ORAL at 06:00

## 2025-02-14 RX ADMIN — FUROSEMIDE 20 MG: 20 TABLET ORAL at 08:59

## 2025-02-14 RX ADMIN — IBUPROFEN 600 MG: 600 TABLET, FILM COATED ORAL at 05:59

## 2025-02-14 RX ADMIN — IBUPROFEN 600 MG: 600 TABLET, FILM COATED ORAL at 00:34

## 2025-02-14 ASSESSMENT — PAIN SCALES - GENERAL
PAINLEVEL_OUTOF10: 0 - NO PAIN
PAINLEVEL_OUTOF10: 0 - NO PAIN

## 2025-02-14 NOTE — PROGRESS NOTES
Postpartum Progress Note    Assessment/Plan   Vianney Miner is a 39 y.o., , who delivered at 39w3d gestation and is now postpartum day 3.    Now PPD#3 s/p , Low Transverse on 2025     Post-op   -Pain well controlled with tylenol and ibuprofen  -DVT prophylaxis with lovenox  -hgb stable, asymptomatic and appropriate for blood loss, s/p 1u blood on 25    Postpartum  -Rh neg, baby pos, s/p rhogam  -lactation on board, delayed milk let down, working on pumping    Preeclampsia with severe features  -increased nifedipine to 90mg daily today, had 60mg yesterday  -lasix 20mg x5 days for SULY (day ), urine output good per patient  -highest BP overnight 158/96    History of anxiety/depression  -on Zoloft and Wellbutrin, no issues with mood  -has good support at home from family and with partner    Sepsis trigger  -s/p 24hr zosyn, WBC downtrended, no fever, lactate normalized  Assessment & Plan  Gestational HTN, third trimester ([facility])    Scoliosis of lumbar spine    Pregnancy Problems (from 24 to present)       Problem Noted Diagnosed Resolved    Gestational HTN, third trimester ([facility]) 2/10/2025 by Josephine Elias MD  No    Priority:  Medium       37 weeks gestation of pregnancy ([facility]) 2024 by Caryn Cooper MD  No    Priority:  Medium       Overview Addendum 2025 10:23 AM by Caryn Cooper MD     ** Previa RESOLVED on follow up anatomic USN  - Repeat USN 31.2 - EFW 54% with no evidence of previa     Desired provider in labor: [] CNM  [x] Physician  [x] Blood Products: [x] Yes, accepts [] No, needs counseling  [x] Initial BMI: 22.81   [x] Prenatal Labs:   [x] Cervical Cancer Screening up to date- 2022 - nml, negative HPV   [x] Rh status: Negative   [x] Genetic Screening:  RR NIPS - It's a GIRL   [x] NT US: (11-13 wks): WNL   [x] Baby ASA (if indicated): started 24  [x] Pregnancy dated by:  LMP c/w USN at 7 weeks     [x] Anatomy US:  (19-20 wks): WNL, had normal fetal echo   [x] 1hr GCT at 24-28wks: elevated but normal 3 hour GTT   [x] Rhogam (if indicated): given    [x] Tdap: given    [x] RSV (32-36 wks): given /3  [x] Flu Vaccine: Given   [x] Updated COVID vaccine recommended     [x] Breastfeeding: has breast pump   [x] Postpartum Birth control method: partner planning vasectomy; may also elect for interval Mirena (endometriosis)   [x] GBS at 36 - 37 wks: negative   [x] 39 weeks discussion of IOL vs. Expectant management: IOL scheduled for  at 8pm   [x] Mode of delivery ( anticipated ):          Encounter for supervision of high risk pregnancy due to advanced maternal age in primigravida 2024 by Caryn Cooper MD  No    Priority:  Medium       Overview Addendum 2024  1:43 PM by Caryn Cooper MD     - RR NIPS  - Taking prophylactic ASA          Depression affecting pregnancy in first trimester, antepartum ([facility]) 2024 by Caryn Cooper MD  No    Priority:  Medium       Overview Addendum 2024  2:32 PM by Caryn Cooper MD     - on wellbutrin 150mg daily and zoloft 25mg daily   - managed by PCP          Complete placenta previa nos or without hemorrhage, unspecified trimester ([facility]) 10/2/2024 by Caryn Cooper MD  10/18/2024 by Caryn Cooper MD    Priority:  Medium       Overview Signed 10/2/2024  5:04 PM by Caryn Cooper MD     <> f/u USN 28 weeks                Hospital course:  Preeclampsia with severe features   section delivery  Patient is currently breastfeedingThe patient's blood type is O NEG. The baby's blood type is B POS. Rhogam indicated and given.    Subjective   Her pain is well controlled with current medications  She is passing flatus and urinating well with the Lasix  She is ambulating well  She is tolerating a Adult diet Regular  She reports poor milk letdown and baby supplementing/eating well with formula, still working on  pumping.  She denies emotional concerns today   Her plan for contraception is IUD at her 6 week follow up appointment.     BP has been okay overnight, highest was 158/96.     Objective   Allergies:   Augmentin [amoxicillin-pot clavulanate]         Last Vitals:  Temp Pulse Resp BP MAP Pulse Ox   36.7 °C (98.1 °F) 98 16 147/90 109 98 %     Vitals Min/Max Last 24 Hours:  Temp  Min: 36.7 °C (98.1 °F)  Max: 37.1 °C (98.8 °F)  Pulse  Min: 92  Max: 105  Resp  Min: 16  Max: 19  BP  Min: 146/92  Max: 158/96  MAP (mmHg)  Min: 109  Max: 110    Intake/Output:   No intake or output data in the 24 hours ending 02/14/25 0925    Physical Exam:  General: examination reveals a well developed, well nourished, female, in no acute distress. She is alert and cooperative.  Abdominal: soft, fundus firm, below umbilicus, nontender  Incision: mepilex dressing intact  Extremities: no redness or tenderness in the calves or thighs, +2 nonpitting edema.  Neurological: alert, oriented, normal speech, no focal findings or movement disorder noted    Lab Data:  Labs in chart were reviewed.  Lab Results   Component Value Date    WBC 19.5 (H) 02/12/2025    HGB 9.7 (L) 02/12/2025    HCT 32.3 (L) 02/12/2025     02/12/2025     Jazmyn Castro DO  Family Medicine - PGY-1    Attending Addendum  Agree with above note.  Patient is POD #3 s/p pLTCS c/b sPEC s/p mag.  Overall feeling well this AM.  Pain well controlled with tylenol/motrin.  Voiding freely and passing flatus.  Lochia decreasing.  Denies HA, blurry vision, scotomata, RUQ/epigastric pain.  S/p 24 hours of zosyn for flagging sepsis criteria. Bps mild range. On exam, fundus is firm at U-2.  Rh neg, s/p rhogam.  Plan bp monitoring today on procardia xl 90 mg daily and lasix 20 mg for 5 day course. Likely discharge home later today and already has 1 wk bp check scheduled.  Has a bp cuff at home.    Ashley Vasquez MD

## 2025-02-14 NOTE — LACTATION NOTE
Lactation Consultant Note  Lactation Consultation  Reason for Consult: Follow-up assessment  Consultant Name: Jose Alberto HUGHES, IBCLC    Maternal Information  Has mother  before?: No  Infant to breast within first 2 hours of birth?: Yes  Exclusive Pump and Bottle Feed: No    Maternal Assessment       Infant Assessment  Infant Behavior: Deep sleep  Infant Assessment: Jaundice    Feeding Assessment  Nutrition Source: Breastmilk, Donor human milk  Feeding Method: Nursing at the breast, Feeding expressed breastmilk, Paced bottle, Finger feeding  Unable to assess infant feeding at this time: Other (Comment) (infant asleep at time of consult)    LATCH TOOL       Breast Pump       Other OB Lactation Tools       Patient Follow-up  Inpatient Lactation Follow-up Needed : Yes  Outpatient Lactation Follow-up: Recommended  Lactation Professional - OK to Discharge: Yes    Other OB Lactation Documentation  Maternal Risk Factors: Age over 30, primiparity,  delivery, Preeclampsia  Infant Risk Factors: Early term birth 37-39 weeks, High birth weight >3600 g    Recommendations/Summary  Patient is a 39 year old, , 39.3 week primary  delivery on 2025 at 0056. Infant with birth weight of 3680g, current weight is 3383g, weight loss of 8.07% (weight increased from previous weight). Infant asleep, visitors present during consult. Mother is concerned that she does not have any milk. Educated mother on expected milk volumes in the first few days postpartum. Mother reports latching infant, giving donor human milk via bottle and then pumping, but only getting drops out with pump. Plan for possible discharge later today. Reinforced feeding plan of latching, supplementing appropriate amount based on age, and then pumping for 15 minutes after feedings. Reviewed setup, use on initiate setting, and cleaning of pump. Reviewed need to pump for 15-20min after each feed/at least every 3 hours. Mother stated she was not using  pump on initiation phase. Parents plan to switch to formula supplementation after discharge. Strongly encouraged to call outpatient lactation today to have a follow up appointment for early next week, phone numbers given. Also, strongly encouraged to call for lactation at next feeding to observe and assess latch.     1145 - follow up, mother called for LC bedside to assess latch. Deep latch observed, no pain with latching, rhythmic burst of suck, swallowing and breathing observed. Infant content at breast. Mother plans to pump after this feeding.

## 2025-02-14 NOTE — DISCHARGE SUMMARY
Discharge Summary    Admission Date: 2/10/2025  Discharge Date: 25    Discharge Diagnosis  Gestational HTN, third trimester ([facility])    Hospital Course  Delivery Date: 2025 12:56 AM  Delivery type: , Low Transverse   GA at delivery: 39w3d  Outcome: Living  Anesthesia during delivery: Epidural  Intrapartum complications: Fetal Intolerance  Feeding method: Breastfeeding Status: Yes     Procedures:  section, magnesium for seizure prophyalxis  Contraception at discharge: none    Pertinent Physical Exam At Time of Discharge  See daily progress note    Last Vitals:  Temp Pulse Resp BP MAP Pulse Ox   36.7 °C (98.1 °F) 98 16 147/90 109 98 %     Discharge Meds     Your medication list        START taking these medications        Instructions Last Dose Given Next Dose Due   acetaminophen 500 mg tablet  Commonly known as: Tylenol Extra Strength      Take 2 tablets (1,000 mg) by mouth every 6 hours if needed for mild pain (1 - 3).       furosemide 20 mg tablet  Commonly known as: Lasix  Start taking on: February 15, 2025      Take 1 tablet (20 mg) by mouth once daily for 3 days. Do not fill before February 15, 2025.       ibuprofen 600 mg tablet      Take 1 tablet (600 mg) by mouth every 6 hours if needed for mild pain (1 - 3).       NIFEdipine ER 90 mg 24 hr tablet  Commonly known as: Procardia XL  Start taking on: February 15, 2025      Take 1 tablet (90 mg) by mouth once daily. Do not crush, chew, or split. Do not fill before February 15, 2025.              CONTINUE taking these medications        Instructions Last Dose Given Next Dose Due   aspirin 81 mg EC tablet      Take 2 tablets (162 mg) by mouth once daily.       buPROPion  mg 24 hr tablet  Commonly known as: Wellbutrin XL      TAKE 1 TABLET (150 MG) BY MOUTH ONCE DAILY IN THE MORNING. DO NOT CRUSH, CHEW, OR SPLIT.       nitrofurantoin 100 mg capsule  Commonly known as: Macrodantin           ondansetron 8 mg tablet  Commonly known as:  Zofran      TAKE 1 TABLET (8 MG) BY MOUTH EVERY 8 HOURS IF NEEDED FOR NAUSEA.       ondansetron 4 mg tablet  Commonly known as: Zofran      TAKE 1 TABLET (4 MG) BY MOUTH EVERY 8 HOURS IF NEEDED FOR NAUSEA OR VOMITING.       pantoprazole 40 mg EC tablet  Commonly known as: Protonix      Take 1 tablet (40 mg) by mouth once daily in the morning. Take before meals. Do not crush, chew, or split.       PRENATAL VITAMIN ORAL           sertraline 50 mg tablet  Commonly known as: Zoloft      Take 1 tablet (50 mg) by mouth once daily.              STOP taking these medications      amoxicillin 500 mg capsule  Commonly known as: Amoxil        azelaic acid 15 % gel  Commonly known as: Finacea        sulfacetamide sodium-sulfur 9.8-4.8 % cleanser                  Where to Get Your Medications        These medications were sent to [facility]  [id number] [address]      Hours: 8:30 AM to 5:00 PM Mon-Fri Phone: [phone]   acetaminophen 500 mg tablet  furosemide 20 mg tablet  ibuprofen 600 mg tablet  NIFEdipine ER 90 mg 24 hr tablet          Complications Requiring Follow-Up  Severe preeclampsia    Test Results Pending At Discharge  Pending Labs       Order Current Status    Blood Culture Preliminary result    Blood Culture Preliminary result            Outpatient Follow-Up  Future Appointments   Date Time Provider Department Center   2/18/2025  1:40 PM [facility]   4/18/2025  9:30 AM Josephine Kapoor DO [id number]   4/28/2025  9:45 AM Randi Tidwell MD [id number]   6/2/2025  3:00 PM [facility]       Ashley Vasquez MD

## 2025-02-14 NOTE — CARE PLAN
The patient's goals for the shift include bond with baby    The clinical goals for the shift include BP remains below severe range    Problem: Safety - Adult  Goal: Free from fall injury  Outcome: Progressing     Problem: Postpartum  Goal: Experiences normal postpartum course  Outcome: Progressing  Flowsheets (Taken 2025)  Experiences normal postpartum course:   Monitor maternal vital signs   Assess uterine involution  Goal: Appropriate maternal -  bonding  Outcome: Progressing  Flowsheets (Taken 2025)  Appropriate maternal- bonding:   Identify family support   24-hour rooming in     Problem: Hypertensive Disorder of Pregnancy (HDP)  Goal: Minimal s/sx of HDP and BP<160/110  Outcome: Progressing  Flowsheets (Taken 2025)  Minimal s/sx of HDP and BP <160/110: Med administration/monitoring of effect     Problem: Discharge Planning  Goal: Discharge to home or other facility with appropriate resources  Outcome: Progressing  Flowsheets (Taken 2025)  Discharge to home or other facility with appropriate resources: Identify barriers to discharge with patient and caregiver

## 2025-02-14 NOTE — CARE PLAN
The patient's goals for the shift include bond with baby    The clinical goals for the shift include return to prepregnant state      Problem: Safety - Adult  Goal: Free from fall injury  Outcome: Met  Flowsheets (Taken 2025 0506 by Renee Hyman, RN)  Free from fall injury: Instruct family/caregiver on patient safety     Problem: Postpartum  Goal: Experiences normal postpartum course  Outcome: Met  Flowsheets (Taken 2025 0650 by Renee Hyman, RN)  Experiences normal postpartum course:   Monitor maternal vital signs   Assess uterine involution  Goal: Appropriate maternal -  bonding  Outcome: Met  Flowsheets (Taken 2025 0650 by Renee Hyman, RN)  Appropriate maternal- bonding:   Identify family support   24-hour rooming in     Problem: Hypertensive Disorder of Pregnancy (HDP)  Goal: Minimal s/sx of HDP and BP<160/110  Outcome: Met  Flowsheets (Taken 2025 0650 by Renee Hyman, RN)  Minimal s/sx of HDP and BP <160/110: Med administration/monitoring of effect     Problem: Discharge Planning  Goal: Discharge to home or other facility with appropriate resources  Outcome: Met  Flowsheets (Taken 2025 0650 by Renee Hyman RN)  Discharge to home or other facility with appropriate resources: Identify barriers to discharge with patient and caregiver

## 2025-02-15 LAB
BACTERIA BLD CULT: NORMAL
BACTERIA BLD CULT: NORMAL

## 2025-02-17 ENCOUNTER — POSTPARTUM VISIT (OUTPATIENT)
Dept: OBSTETRICS AND GYNECOLOGY | Facility: CLINIC | Age: 40
End: 2025-02-17
Payer: COMMERCIAL

## 2025-02-17 VITALS
BODY MASS INDEX: 30.43 KG/M2 | TEMPERATURE: 97.8 F | HEART RATE: 100 BPM | OXYGEN SATURATION: 97 % | WEIGHT: 200.8 LBS | DIASTOLIC BLOOD PRESSURE: 92 MMHG | HEIGHT: 68 IN | SYSTOLIC BLOOD PRESSURE: 131 MMHG

## 2025-02-17 DIAGNOSIS — G89.18 POSTOPERATIVE PAIN: ICD-10-CM

## 2025-02-17 DIAGNOSIS — Z09 POSTOP CHECK: Primary | ICD-10-CM

## 2025-02-17 PROBLEM — O99.341 DEPRESSION AFFECTING PREGNANCY IN FIRST TRIMESTER, ANTEPARTUM (HHS-HCC): Status: RESOLVED | Noted: 2024-07-01 | Resolved: 2025-02-17

## 2025-02-17 PROBLEM — O13.3 GESTATIONAL HTN, THIRD TRIMESTER (HHS-HCC): Status: RESOLVED | Noted: 2025-02-10 | Resolved: 2025-02-17

## 2025-02-17 PROBLEM — O09.519 ENCOUNTER FOR SUPERVISION OF HIGH RISK PREGNANCY DUE TO ADVANCED MATERNAL AGE IN PRIMIGRAVIDA: Status: RESOLVED | Noted: 2024-07-01 | Resolved: 2025-02-17

## 2025-02-17 PROBLEM — Z3A.37 37 WEEKS GESTATION OF PREGNANCY (HHS-HCC): Status: RESOLVED | Noted: 2024-07-01 | Resolved: 2025-02-17

## 2025-02-17 PROBLEM — F32.A DEPRESSION AFFECTING PREGNANCY IN FIRST TRIMESTER, ANTEPARTUM (HHS-HCC): Status: RESOLVED | Noted: 2024-07-01 | Resolved: 2025-02-17

## 2025-02-17 PROCEDURE — 0503F POSTPARTUM CARE VISIT: CPT | Performed by: STUDENT IN AN ORGANIZED HEALTH CARE EDUCATION/TRAINING PROGRAM

## 2025-02-17 RX ORDER — OXYCODONE HYDROCHLORIDE 5 MG/1
5 TABLET ORAL EVERY 6 HOURS PRN
Qty: 10 TABLET | Refills: 0 | Status: SHIPPED | OUTPATIENT
Start: 2025-02-17

## 2025-02-17 RX ORDER — NIFEDIPINE 30 MG/1
30 TABLET, FILM COATED, EXTENDED RELEASE ORAL
Qty: 60 TABLET | Refills: 0 | Status: SHIPPED | OUTPATIENT
Start: 2025-02-17 | End: 2025-04-18

## 2025-02-17 ASSESSMENT — EDINBURGH POSTNATAL DEPRESSION SCALE (EPDS)
THINGS HAVE BEEN GETTING ON TOP OF ME: NO, MOST OF THE TIME I HAVE COPED QUITE WELL
I HAVE LOOKED FORWARD WITH ENJOYMENT TO THINGS: AS MUCH AS I EVER DID
I HAVE BLAMED MYSELF UNNECESSARILY WHEN THINGS WENT WRONG: NOT VERY OFTEN
I HAVE FELT SCARED OR PANICKY FOR NO GOOD REASON: NO, NOT MUCH
I HAVE FELT SAD OR MISERABLE: NOT VERY OFTEN
I HAVE BEEN SO UNHAPPY THAT I HAVE BEEN CRYING: NO, NEVER
TOTAL SCORE: 6
I HAVE BEEN ANXIOUS OR WORRIED FOR NO GOOD REASON: YES, SOMETIMES
I HAVE BEEN SO UNHAPPY THAT I HAVE HAD DIFFICULTY SLEEPING: NOT AT ALL
I HAVE BEEN ABLE TO LAUGH AND SEE THE FUNNY SIDE OF THINGS: AS MUCH AS I ALWAYS COULD
THE THOUGHT OF HARMING MYSELF HAS OCCURRED TO ME: NEVER

## 2025-02-17 NOTE — PROGRESS NOTES
Subjective:  Vianney Miner is a 39 y.o.  now POD#6 from  section for NRFHT presenting for post-operative incision check. She is overall doing well at home. She is taking tylenol and motrin for pain. She is having worsening pain just superior and lateral to left aspect of the incision. She is eating, drinking, voiding, and having bowel movements.  Lochia light. Baby is breast feeding and doing well. Mood ok, tearful but does not feel sad. She has sPEC and is on Nifedipine 90mg daily as well as 5 day course of PO lasix. BPs at home 140s/90s.    Objective:  Vitals:    25 1238   BP: (!) 131/92   Pulse: 100   Temp: 36.6 °C (97.8 °F)   SpO2: 97%     Gen: NAD  Abd: soft, mildly tender to deep palpation just lateral and superior to left corner of incision over area of fascial knot  Skin: clean, dry, and intact; no erythema, discharge, or bleeding  Neuro: alert and oriented  Psych: appropriate affect    Assessment and Plan:  Vianney Miner is a  now POD#6 from primary  section presenting for post-operative wound check, doing well.    Post-Op  -Meeting milestones  -Incision well-approximated without si/sx of infection  -Having pain superior and lateral to left aspect of incision. Abdominal exam otherwise benign. Low suspicion for intra-abdominal bleeding. Also afebrile, low suspicion for abscess. Suspect pain related to fascial knot however if persistent or worsening would recommend CT to exclude intra-abdominal pathology. Oxycodone Rx'ed today. Strict call parameters provided. Will also plan close interval follow-up in the office in 3 days to reassess    sPEC  -Diagnosed by severe range BPs requiring IV treatment  -s/p 24 hours of postpartum Mg  -On Nifedipine 90mg daily. Completed 5 day course of PO lasix today  -BPs at home 140s/90s  -BP today 131/92  -Given anticipated peak in BPs over the next few days, recommend increasing Nifed to 120mg daily. Rx for 30mg sent to pharmacy.  To take together with 90mg tablet. Call parameters provided    Postpartum  -Feeding: breast  -Contraception: vasectomy vs interval Mirena  -Mood: EPDS 6    RTC in 3 days for repeat BP check and close interval follow-up for left sided pain    Aislinn Reich MD

## 2025-02-18 ENCOUNTER — APPOINTMENT (OUTPATIENT)
Dept: OBSTETRICS AND GYNECOLOGY | Facility: CLINIC | Age: 40
End: 2025-02-18
Payer: COMMERCIAL

## 2025-02-19 ENCOUNTER — LACTATION CONSULT (OUTPATIENT)
Dept: LACTATION | Facility: CLINIC | Age: 40
End: 2025-02-19
Payer: COMMERCIAL

## 2025-02-19 DIAGNOSIS — O92.70 DISORDER OF LACTATION (HHS-HCC): Primary | ICD-10-CM

## 2025-02-19 PROCEDURE — 99211 OFF/OP EST MAY X REQ PHY/QHP: CPT | Performed by: OBSTETRICS & GYNECOLOGY

## 2025-02-19 ASSESSMENT — COLUMBIA-SUICIDE SEVERITY RATING SCALE - C-SSRS
1. IN THE PAST MONTH, HAVE YOU WISHED YOU WERE DEAD OR WISHED YOU COULD GO TO SLEEP AND NOT WAKE UP?: NO
6. HAVE YOU EVER DONE ANYTHING, STARTED TO DO ANYTHING, OR PREPARED TO DO ANYTHING TO END YOUR LIFE?: NO
2. HAVE YOU ACTUALLY HAD ANY THOUGHTS OF KILLING YOURSELF?: NO

## 2025-02-19 NOTE — PROGRESS NOTES
"Lactation Counseling Note    Subjective:    Vianney Miner is a 39 y.o. patient referred for lactation counseling. She is here today due to Low milk supply. She was referred by her OB/GYN Kiley .     OB HISTORY:   Baby Name: Mela  /Para: : 1  Para: 1  Weeks Gestation: 39.2  Mode of Delivery: Primary  section  Induction/Augmentation  Epidural/General Anesthesia  Delivery Complications: Non-reassuring fetal status  Maternal Complications: Preeclampsia   Information: : 25  Place of Birth: [place]  APGARS: 1 minute: 9  5 minutes: 9  Skin to skin contact: First hour  Birth weight: 8 lb 2 oz  Birth length: 20.25 inches  Discharge weight: ?  Complications: magSo4    Objective:    BREASTFEEDING ASSESSMENT:   Physical Exam    Breast Assessment: Medium, Filling, and Soft  Nipple Assessment/Stage: intact, erect, erect with stimulation, and rounded after feeding no pain  Feeding Position: breast sandwich, cross - cradle, and both sides  Latch: deep latch obtained, latch achieved, and comfortable with no pain  Suck/Feeding: sustained  Breast Pain: Pain scale 0-10 (10 most pain): 0  Nipple Pain: Pain scale 0-10 (10 most pain): 0  Weight: Reported pediatrician visit weight: 7 lb 8 oz  Date of pediatrician weight: 25  Weight before feedin gm  Weight after feedin gm  Amount of breast milk transferred: 9 ml  Number of voids in the last 24 hours: 6  Number of stools in the last 24 hours: 6    PATIENT DISCUSSION SUMMARY:    LACTATION PROBLEMS AND STRATEGIES:  Low milk supply: \"Power Pump\" (only for full term healthy babies)  Add extra feedings   Allow unrestricted breastfeeding the first few days per week  Check efficiency/comfort of pump and fit of pump breast flange  Check for pregnancy and endocrine levels  Count wet and soiled diapers. By the 5th day, 6 or more wet diapers and at least 3-4 stools  Do not go long periods (greater than 5 hours) without feeding or " "pumping  Finish one breast entirely before offering the second  Follow physician instructions regarding supplementation  Gave PI sheet # 162 \"Breastfeeding: Tips to Increase Your Milk Supply\"   Have the baby weighed. Weight gain during the first 6 months is 4 to 8 ounces per week  If baby is sleepy, wake for feedings  If baby is very hungry, try supplementing with a little EBM prior to putting baby to breast  Increase amount of time spent \"skin to skin\" with baby  Limit or end the use of a pacifier  Listen to music or guided relaxation during pumping  Make sure the baby takes the breast deeply and transfers milk effectively  Nurse a minimum of 10 -12 times per day. Watch for feeding cues  Pump after feedings as directed to increase milk supply  [facility] grade double electrical breast pump  Talk to your health provider concerning the use of herbs or medication to increase milk supply  The baby may be experiencing a growth spurt. Feed frequently until milk supply increases  Use alternate massage during feedings  Use breast massage during pumping and hand expression after pumping  Warm pump flange or use warm compress on breast when pumping  Do not wait for baby to cry before feeding. Watch for feeding cues-opens mouth, sucking and body movements  PATIENT INSTRUCTION HANDOUTS GIVEN:   Healthy Mother and Baby Program  Foods that promote good milk production  Breastfeeding: Tips to increase your milk supply (PI# 162)  LACTATION EDUCATION:  Waking Techniques, Correct Positioning, and Correct Latch On                                                                                            "

## 2025-02-19 NOTE — PATIENT INSTRUCTIONS
"Try smaller flange    Low milk supply: \"Power Pump\" (only for full term healthy babies)  Add extra feedings   Allow unrestricted breastfeeding the first few days per week  Check efficiency/comfort of pump and fit of pump breast flange  Check for pregnancy and endocrine levels  Count wet and soiled diapers. By the 5th day, 6 or more wet diapers and at least 3-4 stools  Do not go long periods (greater than 5 hours) without feeding or pumping  Finish one breast entirely before offering the second  Follow physician instructions regarding supplementation  Gave PI sheet # 162 \"Breastfeeding: Tips to Increase Your Milk Supply\"   Have the baby weighed. Weight gain during the first 6 months is 4 to 8 ounces per week  If baby is sleepy, wake for feedings  If baby is very hungry, try supplementing with a little EBM prior to putting baby to breast  Increase amount of time spent \"skin to skin\" with baby  Limit or end the use of a pacifier  Listen to music or guided relaxation during pumping  Make sure the baby takes the breast deeply and transfers milk effectively  Nurse a minimum of 10 -12 times per day. Watch for feeding cues  Pump after feedings as directed to increase milk supply  [facility] grade double electrical breast pump  Talk to your health provider concerning the use of herbs or medication to increase milk supply  The baby may be experiencing a growth spurt. Feed frequently until milk supply increases  Use alternate massage during feedings  Use breast massage during pumping and hand expression after pumping  Warm pump flange or use warm compress on breast when pumping  Do not wait for baby to cry before feeding. Watch for feeding cues-opens mouth, sucking and body movements  "

## 2025-02-20 ENCOUNTER — HOSPITAL ENCOUNTER (OUTPATIENT)
Dept: CARDIOLOGY | Facility: HOSPITAL | Age: 40
Discharge: HOME | End: 2025-02-20
Payer: COMMERCIAL

## 2025-02-20 ENCOUNTER — POSTPARTUM VISIT (OUTPATIENT)
Dept: OBSTETRICS AND GYNECOLOGY | Facility: CLINIC | Age: 40
End: 2025-02-20
Payer: COMMERCIAL

## 2025-02-20 VITALS — BODY MASS INDEX: 30.08 KG/M2 | DIASTOLIC BLOOD PRESSURE: 85 MMHG | SYSTOLIC BLOOD PRESSURE: 127 MMHG | WEIGHT: 197.8 LBS

## 2025-02-20 DIAGNOSIS — Z09 POSTOP CHECK: ICD-10-CM

## 2025-02-20 DIAGNOSIS — M79.89 PAIN AND SWELLING OF RIGHT LOWER LEG: ICD-10-CM

## 2025-02-20 DIAGNOSIS — M79.661 PAIN AND SWELLING OF RIGHT LOWER LEG: ICD-10-CM

## 2025-02-20 DIAGNOSIS — Z30.09 ENCOUNTER FOR OTHER GENERAL COUNSELING AND ADVICE ON CONTRACEPTION: ICD-10-CM

## 2025-02-20 DIAGNOSIS — R60.0 LOWER EXTREMITY EDEMA: ICD-10-CM

## 2025-02-20 PROCEDURE — 0503F POSTPARTUM CARE VISIT: CPT | Performed by: STUDENT IN AN ORGANIZED HEALTH CARE EDUCATION/TRAINING PROGRAM

## 2025-02-20 PROCEDURE — 93971 EXTREMITY STUDY: CPT

## 2025-02-20 PROCEDURE — 93971 EXTREMITY STUDY: CPT | Performed by: SURGERY

## 2025-02-20 ASSESSMENT — EDINBURGH POSTNATAL DEPRESSION SCALE (EPDS)
I HAVE BEEN ABLE TO LAUGH AND SEE THE FUNNY SIDE OF THINGS: AS MUCH AS I ALWAYS COULD
I HAVE LOOKED FORWARD WITH ENJOYMENT TO THINGS: AS MUCH AS I EVER DID
I HAVE BEEN SO UNHAPPY THAT I HAVE HAD DIFFICULTY SLEEPING: NOT AT ALL
THINGS HAVE BEEN GETTING ON TOP OF ME: NO, MOST OF THE TIME I HAVE COPED QUITE WELL
THE THOUGHT OF HARMING MYSELF HAS OCCURRED TO ME: NEVER
TOTAL SCORE: 6
I HAVE FELT SCARED OR PANICKY FOR NO GOOD REASON: NO, NOT MUCH
I HAVE BEEN SO UNHAPPY THAT I HAVE BEEN CRYING: NO, NEVER
I HAVE FELT SAD OR MISERABLE: NOT VERY OFTEN
I HAVE BEEN ANXIOUS OR WORRIED FOR NO GOOD REASON: HARDLY EVER
I HAVE BLAMED MYSELF UNNECESSARILY WHEN THINGS WENT WRONG: YES, SOME OF THE TIME

## 2025-02-20 NOTE — PROGRESS NOTES
Subjective:  Vianney Miner is a 39 y.o.  now POD#9 from  section for NRFHT presenting for BP check for sPEC. Was last seen 3 days ago and was increased from Nifed 90mg to Nifed 120mg. BPs at home 110s/70s after first dose of 120mg so she has since gone back to 90mg. BPs now 120s/80s. BP this AM 110s/70s and has not taken her Nifedipine yet. She was also having left sided abdominal pain at her last appointment which is now improving. Has used two doses of oxycodone. Nausea resolved. Having regular BMs and passing flatus. Lochia light. Breast feeding and baby doing well. Bilateral LE edema R>L, right does not improve with elevation but left does.     Objective:  Vitals:    25 0958   BP: 127/85     Gen: awake, alert  Head: NCAT  HEENT: moist mucus membranes  Pulm: breathing comfortably on room air  CV: warm and well-perfused  Abd: soft, appropriately tender to palpation, incision c/d/i  Neuro: alert and oriented  Psych: appropriate affect   Ext: bilateral LE edema R>L, no erythema or warmth, no calf tenderness, negative Mara    Assessment and Plan:  Vianney Miner is a  now POD#9 presenting for BP check    sPEC  -On Nifedipine 90mg daily. Was increased to 120mg daily three days ago however was hypotensive so has since gone back to 90mg daily  -Also completed 5 day course of PO lasix  -BPs at home normotensive -> 110s/70s this AM before taking her Nifedipine  -BP today 127/85  -To decrease Nifed to 60mg daily with call parameters provided    Post-Op  -Left sided abdominal pain improving  -Abdominal exam benign    RLE Edema  -Significantly greater than left on exam today without improvement with elevation  -For RLE dopplers    Postpartum  -Feeding: breast  -Contraception: interval Mirena. Discussed at length today. Pelvic rest until next visit. To premedicate with ibuprofen  -Mood: EPDS 6    RTC in 5 weeks for postpartum visit    30 minutes were spent in the care of this patient. 5  minutes reviewing records, 20 minutes face to face, and 5 minutes documenting    Aislinn Reich MD

## 2025-02-25 NOTE — PROGRESS NOTES
Lactation Counseling Note    Subjective:    Vianney Miner is a 39 y.o. patient referred for lactation counseling. She is here today due to Low milk supply. She was referred by her OB/GYN Kiley .     OB HISTORY:   Healthcare Provider: OB/GYN Kiley  /Para: : 1  Para: 1  Weeks Gestation: 39.2  Mode of Delivery: Primary  section  Induction/Augmentation  Epidural/General Anesthesia  Delivery Complications: non reassuring fetal status  Maternal Complications: Preeclampsia  Crowder Information: Baby's Name: Mela Acevedo  : 2025  MRN: [id number]  Place of Birth: [facility] Provider: Dr Ray (CC)  APGARS: 1 minute: 9  5 minutes: 9  Birth weight: 3680 gm  Discharge weight: 3383 gm  Complications: triple feeding in hospital    Objective:    BREASTFEEDING ASSESSMENT:   Physical Exam    {Breastfeeding Assessment:77761}    PATIENT DISCUSSION SUMMARY:    LACTATION PROBLEMS AND STRATEGIES:  {Problems and Strategies:86715}  PATIENT INSTRUCTION HANDOUTS GIVEN:   {Lactation counseling patient instructions/handouts:23206}  LACTATION EDUCATION:  {Lactation Educational Needs:40484}

## 2025-02-27 ENCOUNTER — APPOINTMENT (OUTPATIENT)
Dept: LACTATION | Facility: CLINIC | Age: 40
End: 2025-02-27
Payer: COMMERCIAL

## 2025-03-04 ENCOUNTER — APPOINTMENT (OUTPATIENT)
Dept: LACTATION | Facility: CLINIC | Age: 40
End: 2025-03-04
Payer: COMMERCIAL

## 2025-03-11 ENCOUNTER — APPOINTMENT (OUTPATIENT)
Dept: PRIMARY CARE | Facility: CLINIC | Age: 40
End: 2025-03-11
Payer: COMMERCIAL

## 2025-03-11 VITALS
TEMPERATURE: 98.2 F | HEART RATE: 72 BPM | OXYGEN SATURATION: 100 % | WEIGHT: 197 LBS | RESPIRATION RATE: 16 BRPM | BODY MASS INDEX: 29.86 KG/M2 | HEIGHT: 68 IN | DIASTOLIC BLOOD PRESSURE: 72 MMHG | SYSTOLIC BLOOD PRESSURE: 108 MMHG

## 2025-03-11 DIAGNOSIS — F41.9 ANXIETY: ICD-10-CM

## 2025-03-11 DIAGNOSIS — F41.8 POSTPARTUM ANXIETY: ICD-10-CM

## 2025-03-11 DIAGNOSIS — F33.0 MILD EPISODE OF RECURRENT MAJOR DEPRESSIVE DISORDER (CMS-HCC): Primary | ICD-10-CM

## 2025-03-11 PROCEDURE — 99214 OFFICE O/P EST MOD 30 MIN: CPT | Performed by: INTERNAL MEDICINE

## 2025-03-11 PROCEDURE — 3008F BODY MASS INDEX DOCD: CPT | Performed by: INTERNAL MEDICINE

## 2025-03-11 NOTE — PROGRESS NOTES
"Subjective   Vianney Miner is a 39 y.o. female who presents for 6 month Follow-up.    HPI   Gave birth 2/11/25.  Concerned for Post Partum Depression.  Frequent crying.  Increased Sertraline to 50 mg approx 10 days ago.  Reports improvement in sx's.  Continues w/Therapist.    Review of Systems   Constitutional:  Negative for fatigue and fever.   Respiratory:  Negative for cough and shortness of breath.    Cardiovascular:  Negative for chest pain and leg swelling.   All other systems reviewed and are negative.      Health Maintenance Due   Topic Date Due    Hepatitis B Vaccines (1 of 3 - 19+ 3-dose series) Never done    Varicella Vaccines (1 of 2 - 13+ 2-dose series) Never done    COVID-19 Vaccine (2 - 2024-25 season) 09/01/2024       Objective   /72   Pulse 72   Temp 36.8 °C (98.2 °F)   Resp 16   Ht 1.727 m (5' 8\")   Wt 89.4 kg (197 lb)   SpO2 100%   Breastfeeding Yes   BMI 29.95 kg/m²     Physical Exam  Vitals and nursing note reviewed.   Constitutional:       Appearance: Normal appearance.   HENT:      Head: Normocephalic.   Eyes:      Conjunctiva/sclera: Conjunctivae normal.      Pupils: Pupils are equal, round, and reactive to light.   Cardiovascular:      Rate and Rhythm: Normal rate and regular rhythm.      Pulses: Normal pulses.      Heart sounds: Normal heart sounds.   Pulmonary:      Effort: Pulmonary effort is normal.      Breath sounds: Normal breath sounds.   Musculoskeletal:         General: No swelling.      Cervical back: Neck supple.   Skin:     General: Skin is warm and dry.   Neurological:      General: No focal deficit present.      Mental Status: She is oriented to person, place, and time.         Assessment/Plan   Problem List Items Addressed This Visit       Depression - Primary     Other Visit Diagnoses       Anxiety        Postpartum anxiety              Symptoms improving with increased sertraline  Cont meds  Call in 2-3 weeks  Fu in 3 months  Stable based on symptoms and " exam.  Continue established treatment plan and follow up at least yearly.

## 2025-03-12 ASSESSMENT — ENCOUNTER SYMPTOMS
COUGH: 0
SHORTNESS OF BREATH: 0
FEVER: 0
FATIGUE: 0

## 2025-03-27 ENCOUNTER — APPOINTMENT (OUTPATIENT)
Dept: OBSTETRICS AND GYNECOLOGY | Facility: CLINIC | Age: 40
End: 2025-03-27
Payer: COMMERCIAL

## 2025-03-27 VITALS
WEIGHT: 197.8 LBS | SYSTOLIC BLOOD PRESSURE: 105 MMHG | DIASTOLIC BLOOD PRESSURE: 73 MMHG | BODY MASS INDEX: 29.98 KG/M2 | HEIGHT: 68 IN

## 2025-03-27 DIAGNOSIS — Z09 POSTOP CHECK: ICD-10-CM

## 2025-03-27 DIAGNOSIS — Z30.430 ENCOUNTER FOR IUD INSERTION: ICD-10-CM

## 2025-03-27 LAB — PREGNANCY TEST URINE, POC: NEGATIVE

## 2025-03-27 PROCEDURE — 81025 URINE PREGNANCY TEST: CPT | Performed by: STUDENT IN AN ORGANIZED HEALTH CARE EDUCATION/TRAINING PROGRAM

## 2025-03-27 PROCEDURE — 58300 INSERT INTRAUTERINE DEVICE: CPT | Performed by: STUDENT IN AN ORGANIZED HEALTH CARE EDUCATION/TRAINING PROGRAM

## 2025-03-27 PROCEDURE — 99213 OFFICE O/P EST LOW 20 MIN: CPT | Performed by: STUDENT IN AN ORGANIZED HEALTH CARE EDUCATION/TRAINING PROGRAM

## 2025-03-27 ASSESSMENT — EDINBURGH POSTNATAL DEPRESSION SCALE (EPDS)
I HAVE BEEN SO UNHAPPY THAT I HAVE HAD DIFFICULTY SLEEPING: NOT AT ALL
I HAVE FELT SCARED OR PANICKY FOR NO GOOD REASON: NO, NOT AT ALL
I HAVE BEEN ANXIOUS OR WORRIED FOR NO GOOD REASON: HARDLY EVER
I HAVE LOOKED FORWARD WITH ENJOYMENT TO THINGS: AS MUCH AS I EVER DID
I HAVE FELT SAD OR MISERABLE: NOT VERY OFTEN
I HAVE BEEN SO UNHAPPY THAT I HAVE BEEN CRYING: ONLY OCCASIONALLY
THE THOUGHT OF HARMING MYSELF HAS OCCURRED TO ME: NEVER
THINGS HAVE BEEN GETTING ON TOP OF ME: NO, MOST OF THE TIME I HAVE COPED QUITE WELL
I HAVE BEEN ABLE TO LAUGH AND SEE THE FUNNY SIDE OF THINGS: AS MUCH AS I ALWAYS COULD
I HAVE BLAMED MYSELF UNNECESSARILY WHEN THINGS WENT WRONG: NOT VERY OFTEN
TOTAL SCORE: 5

## 2025-03-27 NOTE — PROGRESS NOTES
Subjective:  Vianney Miner is a female 39 y.o. year old  who is 6 weeks postpartum from [facility] presenting for postpartum visit. Overall, she feels well. She is able to perform daily tasks without difficulty. Lochia ceased. She has not had a period since that time. She has not resumed intercourse and plans Mirena IUD for contraception. She feels well-supported at home. She is currently breast feeding and following with breastfeeding medicine at [facility] for low supply. Mood good.     Objective:  Vitals:    25 1347   BP: 105/73     Gen: awake, alert  Head: NCAT  HEENT: moist mucus membranes  Pulm: breathing comfortably on room air  CV: warm and well-perfused  Abd: pfannenstiel incision c/d/i  : no blood or discharge in vault  Neuro: alert and oriented  Psych: appropriate affect     IUD Insertion    Performed by: Aislinn Reich MD  Authorized by: Aislinn Reich MD    Procedure: IUD insertion    Consent obtained by patient, parent, or legal power of  - including discussion of procedure risks and benefits, patient questions answered, and patient education provided: yes    Pregnancy risk: reasonably certain the patient is not pregnant    Date/Time of Insertion:  3/27/2025 2:27 PM  Immediately prior to procedure a time out was called: yes    Pelvic exam performed: yes    Speculum placed in vagina: yes    Cervix cleaned and prepped: yes    Tenaculum/Allis/Ring Forceps applied to cervix: yes    Anesthesia used: no    Uterus sound depth (cm):  9  Cervix manually dilated: no    IUD inserted without complications: yes    OSM: levonorgestrel 20 mcg/24hr  Strings trimmed to (cm):  2  Patient tolerated procedure well: yes    Inserted with ultrasound guidance: no    Transvaginal sono confirmed fundal placement: yes    Estimated blood loss (mL):  2  Intended removal date: 8 years    Insertion comments: Uncomplicated Mirena IUD insertion. Fundal position confirmed with TVUS    Assessment and Plan:  Vianney BRANNON  Curtis Miner is a female 39 y.o. year old  who is 6 weeks postpartum from [facility] presenting for postpartum visit    Postpartum  -Postpartum care: may resume normal activities including exercise, work, intercourse  -Feeding: breast, following with breastfeeding medicine at [facility]  -Pap: NIL/HRHPV neg (2022), next due in     Mirena IUD  -No intercourse since delivery, UPT negative, pregnancy reasonably excluded  -Discussed efficacy, risk of ectopic, risk of expulsion, anticipated bleeding pattern, and duration of use. Reviewed risks of insertion including pain, bleeding, infection, and uterine perforation requiring surgical removal. Consent signed  -IUD inserted today without complication    sPEC  -s/p 5 day course of PO lasix  -Weaned off Nifedipine   -Normotensive at home and in the office today  -Now resolved    Postpartum Depression/Anxiety  -On Wellbutrin 150mg and zoloft 50mg daily  -EPDS 5  -Following with PCP (Dr. Kapoor)    RTC in 1 month for IUD check    Aislinn Reich MD

## 2025-04-18 ENCOUNTER — APPOINTMENT (OUTPATIENT)
Dept: PRIMARY CARE | Facility: CLINIC | Age: 40
End: 2025-04-18
Payer: COMMERCIAL

## 2025-04-24 NOTE — PROGRESS NOTES
Subjective   Patient ID [id number]   Vianney Miner is a 39 y.o.  who is 2 months postpartum presenting today for Mirena IUD check. Mirena IUD inserted on 3/27/25. Since then, reports bleeding like a period for a few weeks that is now tapering off. Partner can feel strings during intercourse and it is uncomfortable for him. She is transitioning from breast to formula feeding due to low supply.     Objective   Physical Exam  Vitals:    25 0923   BP: 105/63      Gen: awake, alert  Head: NCAT  HEENT: moist mucus membranes  Pulm: breathing comfortably on room air  CV: warm and well-perfused  : IUD strings visualized at os and 3 cm in length, fundal position confirmed by TVUS, strings tucked into cervical canal  Neuro: alert and oriented  Psych: appropriate affect     Assessment/Plan     Vianney Miner is a 39 y.o.  who is 2 months postpartum presenting today for Mirena IUD check.    Mirena IUD  -Inserted 3/27/25  -Strings visualized on exam today, fundal position confirmed with BSUS  -Strings tucked into cervical canal as partner can feel them during intercourse. Should pain persist, did discuss trimming strings at next visit though reviewed that this would make it slightly harder to remove IUD when it is due to be removed  -Bleeding improving. Did discuss trial of NSAIDs vs OCPs if still having breakthrough bleeding at 3 months    Weaning  -Transitioning to formula due to low supply  -Discussed tapering pumping, tight fitting bra, NSAIDs, and ice for engorgement    Follow up for annual exam as scheduled in .    Aislinn Reich MD

## 2025-04-25 ENCOUNTER — APPOINTMENT (OUTPATIENT)
Dept: OBSTETRICS AND GYNECOLOGY | Facility: CLINIC | Age: 40
End: 2025-04-25
Payer: COMMERCIAL

## 2025-04-25 VITALS
SYSTOLIC BLOOD PRESSURE: 105 MMHG | HEIGHT: 68 IN | BODY MASS INDEX: 29.7 KG/M2 | DIASTOLIC BLOOD PRESSURE: 63 MMHG | WEIGHT: 196 LBS

## 2025-04-25 DIAGNOSIS — Z30.431 IUD CHECK UP: Primary | ICD-10-CM

## 2025-04-25 PROCEDURE — 99213 OFFICE O/P EST LOW 20 MIN: CPT | Performed by: STUDENT IN AN ORGANIZED HEALTH CARE EDUCATION/TRAINING PROGRAM

## 2025-04-25 PROCEDURE — 3008F BODY MASS INDEX DOCD: CPT | Performed by: STUDENT IN AN ORGANIZED HEALTH CARE EDUCATION/TRAINING PROGRAM

## 2025-04-25 PROCEDURE — 1036F TOBACCO NON-USER: CPT | Performed by: STUDENT IN AN ORGANIZED HEALTH CARE EDUCATION/TRAINING PROGRAM

## 2025-04-25 RX ORDER — LEVONORGESTREL 52 MG/1
1 INTRAUTERINE DEVICE INTRAUTERINE ONCE
COMMUNITY
Start: 2025-03-27

## 2025-04-27 NOTE — PROGRESS NOTES
Urogynecology  Provider:  Randi Tidwell MD  [phone]    ASSESSMENT AND PLAN:   39 year old female with hx of IC, chronic MPP, and Pk. Comorbidities include: Lumbar scoliosis.     Diagnoses:  #1 Chronic myofascial pelvic pain  #2 Recurrent UTI  #3 Interstitial cystitis     Plan:  1. Chronic MPP  - The patient is interested in restarting PFPT as she is now experiencing persistent deep dyspareunia and has had beneficial results in attending PFPT in the past to reduce her MPP.    - Previously used Tramadol prior to her recent pregnancy to manage MPP but has since weaned off these medications.  - PFPT requisition sent today and gave her the list of local physical therapists with their corresponding locations/contact information.    2. Pk, hx of IC  - POCT UA with trace lysed blood today.   - Patient has a history of interstitial cystitis (IC) and has previously taken Amitriptyline but declines interest in restarting it at this time as IC remains stable with no recent flares.   - Plan to start her on Macrobid 100mg PRN postcoital for UTI ppx.     Follow up in 4-5 months for a virtual visit with Dr. Tidwell.     Scribe Attestation  By signing my name below, I, Rizwan Gomez, attest that this documentation has been prepared under the direction and in the presence of Randi Tidwell MD on 04/28/2025 at 3:39 PM.     Agree with above. I Dr. Tidwell, personally performed the services described in the documentation which was scribed virtually and confirm it is both complete and accurate.  Randi Tidwell MD        Problem List Items Addressed This Visit          Genitourinary and Reproductive    Chronic pelvic pain in female - Primary    Relevant Orders    POCT UA Automated manually resulted (Completed)    Referral to Physical Therapy     Other Visit Diagnoses         Recurrent UTI        Relevant Medications    nitrofurantoin (Macrodantin) 100 mg capsule      Pelvic pain in female        Relevant  Orders    Referral to Physical Therapy                I spent a total of eConsult Time: 25 minutes in face to face and non face to face time.        Randi Tidwell MD        HISTORY OF PRESENT ILLNESS:   39 year old female presenting in follow up for chronic MPP.     Records Review:   - 2025 urgent LTCS for NRFHTs, gestational HTN      Pelvic Symptoms:   - Patient experiences dyspareunia, specifically pain with insertion and deep penetration.  - She has a history of interstitial cystitis (IC) and has previously taken Amitriptyline but prefers not to restart it at this time as IC remains stable with no recent flares.   - The patient is interested in restarting PFPT which has been beneficial at reducing MPP in the past.  - Previously used Tramadol to manage MPP but has weaned off these medications.    OBGYN History:  - , x1 urgent LTCS for NRFHTs and gestational HTN  - Patient is 11 weeks postpartum following a  section due to high blood pressure and non-reassuring fetal heart tracings.  - She stopped breastfeeding due to low supply and is transitioning to formula feeding.   - Mirena IUD placed on 3/27/2025 for contraception.       Past Medical History:    Medical History[1]       Past Surgical History:     Surgical History[2]      Medications:     Prior to Admission medications    Medication Sig Start Date End Date Taking? Authorizing Provider   buPROPion XL (Wellbutrin XL) 150 mg 24 hr tablet TAKE 1 TABLET (150 MG) BY MOUTH ONCE DAILY IN THE MORNING. DO NOT CRUSH, CHEW, OR SPLIT. 24  Josephine Kapoor, DO   levonorgestrel (Mirena) 20 mcg/24hr IUD 52 mg by intrauterine route 1 time. 3/27/25   Historical Provider, MD   nitrofurantoin (Macrodantin) 100 mg capsule TAKE 1 CAPSULE BY MOUTH DAILY TAKE AFTER INTERCOURSE.    Historical Provider, MD   prenatal vit no.124/iron/folic (PRENATAL VITAMIN ORAL) Take 1 tablet by mouth once daily.    Historical Provider, MD   sertraline (Zoloft)  50 mg tablet Take 1 tablet (50 mg) by mouth once daily. 7/8/24 7/8/25  DO ARCHIE Mcallister  Review of Systems   Constitutional: Negative.    HENT: Negative.     Eyes: Negative.    Respiratory: Negative.     Cardiovascular: Negative.    Gastrointestinal: Negative.    Endocrine: Negative.    Genitourinary:  Positive for dyspareunia and pelvic pain.   Musculoskeletal: Negative.    Neurological: Negative.    Psychiatric/Behavioral: Negative.          Blood, Urine   Date Value Ref Range Status   02/11/2025 0.06 (1+) (A) NEGATIVE mg/dL Final     Poc Nitrite, Urine   Date Value Ref Range Status   09/04/2024 NEGATIVE NEGATIVE Final     Nitrite, Urine   Date Value Ref Range Status   02/11/2025 NEGATIVE NEGATIVE Final     Urobilinogen, Urine   Date Value Ref Range Status   02/11/2025 Normal Normal mg/dL Final         PHYSICAL EXAM:    There were no vitals taken for this visit.  No LMP recorded.      Declines chaperone for physical exam.      Well developed, well nourished, in no apparent distress.   Neurologic/Psychiatric:  Awake, Alert and Oriented times 3.  Affect normal.     GENITAL/URINARY:     External Genitalia:  The patient has normal appearing external genitalia, normal skenes and bartholins glands, and a normal hair distribution.  Her vulva is without lesions, erythema or discharge.  It is non-tender with appropriate sensation.     Urethral Meatus:  Size normal, Location normal, Lesions absent, Prolapse absent.    Urethra:  Fullness absent, Masses absent.    Bladder:  Fullness absent, Masses absent, Tenderness absent.    Vagina:  General appearance normal, Estrogen effect normal, Discharge absent, Lesions absent. No pelvic organ prolapse.     Cervix: Normal, no discharge. IUD strings are visualized.   Uterus:  normal size, mobile, and nontender  Adnexa: normal, no masses or tenderness over the bilateral adnexa     Anus/Perineum:  Lesions absent and masses absent. Normal perineum and anus. No hemorrhoids.      Stress urinary incontinence not demonstrable.         POP-Q:  Stage: 0  Position: dorsal lithotomy     Aa: -3       Ba: C: -9   Gh:  Pb:  TVL: 10         Ap: -3 Bp:  D: -10             Pelvic floor muscles: Hypertonic pelvic floor muscles and moderate tenderness upon palpation bilaterally.       Data and DIAGNOSTIC STUDIES REVIEWED   Imaging  No results found.    Cardiology, Vascular, and Other Imaging  No other imaging results found for the past 7 days     Lab Results   Component Value Date    URINECULTURE Normal genitourinary bal 11/19/2024      Lab Results   Component Value Date    GLUCOSE 93 02/11/2025    CALCIUM 8.3 (L) 02/11/2025     (L) 02/11/2025    K 4.1 02/11/2025    CO2 18 (L) 02/11/2025     02/11/2025    BUN 16 02/11/2025    CREATININE 0.61 02/11/2025     Lab Results   Component Value Date    WBC 19.5 (H) 02/12/2025    HGB 9.7 (L) 02/12/2025    HCT 32.3 (L) 02/12/2025    MCV 88 02/12/2025     02/12/2025          Randi Tidwell MD         [1]   Past Medical History:  Diagnosis Date    Abnormal Pap smear of cervix Not recently    Anxiety 2004    Depression     Endometriosis Surgery january 2024    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

## 2025-04-28 ENCOUNTER — OFFICE VISIT (OUTPATIENT)
Dept: OBSTETRICS AND GYNECOLOGY | Facility: CLINIC | Age: 40
End: 2025-04-28
Payer: COMMERCIAL

## 2025-04-28 DIAGNOSIS — R10.2 PELVIC PAIN IN FEMALE: ICD-10-CM

## 2025-04-28 DIAGNOSIS — N39.0 RECURRENT UTI: ICD-10-CM

## 2025-04-28 DIAGNOSIS — R10.2 CHRONIC PELVIC PAIN IN FEMALE: Primary | ICD-10-CM

## 2025-04-28 DIAGNOSIS — G89.29 CHRONIC PELVIC PAIN IN FEMALE: Primary | ICD-10-CM

## 2025-04-28 LAB
POC APPEARANCE, URINE: CLEAR
POC BILIRUBIN, URINE: NEGATIVE
POC BLOOD, URINE: ABNORMAL
POC COLOR, URINE: YELLOW
POC GLUCOSE, URINE: NEGATIVE MG/DL
POC KETONES, URINE: NEGATIVE MG/DL
POC LEUKOCYTES, URINE: NEGATIVE
POC NITRITE,URINE: NEGATIVE
POC PH, URINE: 6 PH
POC PROTEIN, URINE: NEGATIVE MG/DL
POC SPECIFIC GRAVITY, URINE: 1.02
POC UROBILINOGEN, URINE: 0.2 EU/DL

## 2025-04-28 PROCEDURE — 81003 URINALYSIS AUTO W/O SCOPE: CPT | Mod: QW | Performed by: OBSTETRICS & GYNECOLOGY

## 2025-04-28 PROCEDURE — 99213 OFFICE O/P EST LOW 20 MIN: CPT | Performed by: OBSTETRICS & GYNECOLOGY

## 2025-04-28 PROCEDURE — 1036F TOBACCO NON-USER: CPT | Performed by: OBSTETRICS & GYNECOLOGY

## 2025-04-28 RX ORDER — NITROFURANTOIN (MACROCRYSTALS) 100 MG/1
100 CAPSULE ORAL DAILY PRN
Qty: 30 CAPSULE | Refills: 2 | Status: SHIPPED | OUTPATIENT
Start: 2025-04-28

## 2025-04-28 ASSESSMENT — ENCOUNTER SYMPTOMS
ENDOCRINE NEGATIVE: 1
RESPIRATORY NEGATIVE: 1
CARDIOVASCULAR NEGATIVE: 1
NEUROLOGICAL NEGATIVE: 1
CONSTITUTIONAL NEGATIVE: 1
GASTROINTESTINAL NEGATIVE: 1
MUSCULOSKELETAL NEGATIVE: 1
PSYCHIATRIC NEGATIVE: 1
EYES NEGATIVE: 1

## 2025-05-02 ENCOUNTER — TELEPHONE (OUTPATIENT)
Dept: OBSTETRICS AND GYNECOLOGY | Facility: CLINIC | Age: 40
End: 2025-05-02

## 2025-05-02 DIAGNOSIS — R10.2 PELVIC PAIN IN FEMALE: Primary | ICD-10-CM

## 2025-05-02 DIAGNOSIS — F33.1 MODERATE EPISODE OF RECURRENT MAJOR DEPRESSIVE DISORDER: ICD-10-CM

## 2025-05-02 RX ORDER — BUPROPION HYDROCHLORIDE 150 MG/1
150 TABLET ORAL EVERY MORNING
Qty: 90 TABLET | Refills: 1 | Status: SHIPPED | OUTPATIENT
Start: 2025-05-02 | End: 2025-10-29

## 2025-05-02 NOTE — TELEPHONE ENCOUNTER
Patient called and stated she is in pain and would like to know if she can get a refill on amitriptyline and traMADol. Would like someone to contact her to answer other questions she has

## 2025-05-03 DIAGNOSIS — R10.2 CHRONIC PELVIC PAIN IN FEMALE: Primary | ICD-10-CM

## 2025-05-03 DIAGNOSIS — G89.29 CHRONIC PELVIC PAIN IN FEMALE: Primary | ICD-10-CM

## 2025-05-03 RX ORDER — TRAMADOL HYDROCHLORIDE 50 MG/1
50 TABLET ORAL EVERY 6 HOURS PRN
Qty: 20 TABLET | Refills: 0 | Status: SHIPPED | OUTPATIENT
Start: 2025-05-03

## 2025-05-03 RX ORDER — TRAMADOL HYDROCHLORIDE 50 MG/1
50 TABLET, FILM COATED ORAL EVERY 6 HOURS PRN
Qty: 20 TABLET | Refills: 0 | Status: SHIPPED | OUTPATIENT
Start: 2025-05-03 | End: 2025-05-03 | Stop reason: SDUPTHER

## 2025-05-03 RX ORDER — AMITRIPTYLINE HYDROCHLORIDE 10 MG/1
10 TABLET, FILM COATED ORAL NIGHTLY
Qty: 60 TABLET | Refills: 6 | Status: SHIPPED | OUTPATIENT
Start: 2025-05-03 | End: 2026-05-03

## 2025-05-21 NOTE — PROGRESS NOTES
Urogynecology  Provider:  Randi Tidwell MD  [phone]              ASSESSMENT AND PLAN:   39-year-old female being assessed for myofascial pelvic pain.    Diagnoses:  #1 Myofascial pelvic pain    Plan:  MPP  - Continue Amitriptyline as prescribed, but consider a half dose to mitigate sedation if a full dose is not tolerable with a  at home.  - Encouraged consistent use of medication to prevent flare-ups.  - She reports improvement in pain since starting Amitriptyline.  - She has attended one PFPT session and plans to continue as work schedule allows.    Follow-up with Dr. Tidwell in 3 months or PRN sooner.  She will contact us if assistance with FMLA paperwork is necessitated.    Scribe Attestation  By signing my name below, IAlfonso Scribe, attest that this documentation has been prepared under the direction and in the presence of Randi Tidwell MD on 2025 at 12:00 PM.    Agree with above. I Dr. Tidwell, personally performed the services described in the documentation which was scribed virtually and confirm it is both complete and accurate.  Randi Tidwell MD      Problem List Items Addressed This Visit    None          I spent a total of 12 minutes in face to face and non face to face time at this virtual visit.      Virtual or Telephone Consent    While technically available, the patient was unable or unwilling to consent to connect via audio/video telehealth technology; therefore, I performed this visit using a real-time audio only connection between Vianney Miner & Randi Tidwell MD.  Verbal consent was requested and obtained from Vianney Miner on this date, 25 for a telehealth visit and the patient's location was confirmed at the time of the visit.      Randi Tidwell MD        HISTORY OF PRESENT ILLNESS:     Last visit 25  ASSESSMENT AND PLAN:   39 year old female with hx of IC, chronic MPP, and Pk. Comorbidities include: Lumbar scoliosis.       Diagnoses:  #1 Chronic myofascial pelvic pain  #2 Recurrent UTI  #3 Interstitial cystitis      Plan:  1. Chronic MPP  - The patient is interested in restarting PFPT as she is now experiencing persistent deep dyspareunia and has had beneficial results in attending PFPT in the past to reduce her MPP.    - Previously used Tramadol prior to her recent pregnancy to manage MPP but has since weaned off these medications.  - PFPT requisition sent today and gave her the list of local physical therapists with their corresponding locations/contact information.     2. Pk, hx of IC  - POCT UA with trace lysed blood today.   - Patient has a history of interstitial cystitis (IC) and has previously taken Amitriptyline but declines interest in restarting it at this time as IC remains stable with no recent flares.   - Plan to start her on Macrobid 100mg PRN postcoital for UTI ppx.      Follow up in 4-5 months for a virtual visit with Dr. Tidwell.     Was having a lot of pain so tramadol and amitriptyline restarted late April          Pelvic Symptoms:  - She reports the pain is better.  - She has been taking Amitriptyline inconsistently due to side effects, specifically being especially tired.  - She is concerned about sleeping through her 's needs due to the medication's side effects.  - She attended PFPT once but hasn't been able to return due to resuming work and not having any leave time.  - She is considering options for partial FMLA to accommodate PFPT sessions.  - She feels like she can't care for her  as well with the excessive tiredness from Amitriptyline.  - The medication helped manage a previous flare-up, making pelvic pain more manageable for her.       Past Medical History:       Medical History[1]       Past Surgical History:       Surgical History[2]      Medications:       Prior to Admission medications    Medication Sig Start Date End Date Taking? Authorizing Provider   amitriptyline (Elavil) 10  mg tablet Take 1 tablet (10 mg) by mouth once daily at bedtime. Take 1 tab at bedtime nightly. May increase to 2 tabs at bedtime after 1 to 2 weeks if not too tired in am. 5/3/25 5/3/26  Randi Tidwell MD   buPROPion XL (Wellbutrin XL) 150 mg 24 hr tablet TAKE 1 TABLET (150 MG) BY MOUTH ONCE DAILY IN THE MORNING. DO NOT CRUSH, CHEW, OR SPLIT. 5/2/25 10/29/25  Josephine Kapoor, DO   levonorgestrel (Mirena) 20 mcg/24hr IUD 52 mg by intrauterine route 1 time. 3/27/25   Historical Provider, MD   nitrofurantoin (Macrodantin) 100 mg capsule Take 1 capsule (100 mg) by mouth once daily as needed (post coital). TAKE 1 CAPSULE BY MOUTH DAILY TAKE AFTER INTERCOURSE. 4/28/25   Winsome Bhakta MD   prenatal vit no.124/iron/folic (PRENATAL VITAMIN ORAL) Take 1 tablet by mouth once daily.    Historical Provider, MD   sertraline (Zoloft) 50 mg tablet Take 1 tablet (50 mg) by mouth once daily. 7/8/24 7/8/25  Josephine Kapoor,    traMADol (Ultram) 50 mg tablet Take 1 tablet (50 mg) by mouth every 6 hours if needed for moderate pain (4 - 6). 5/3/25   Adore Moay MD        ROS  Review of Systems   Constitutional:  Positive for fatigue.   HENT: Negative.     Eyes: Negative.    Respiratory: Negative.     Cardiovascular: Negative.    Gastrointestinal: Negative.    Endocrine: Negative.    Musculoskeletal: Negative.    Neurological: Negative.    Psychiatric/Behavioral: Negative.            Data and DIAGNOSTIC STUDIES REVIEWED   Imaging  No results found.    Cardiology, Vascular, and Other Imaging  No other imaging results found for the past 7 days     Lab Results   Component Value Date    URINECULTURE Normal genitourinary bal 11/19/2024      Lab Results   Component Value Date    GLUCOSE 93 02/11/2025    CALCIUM 8.3 (L) 02/11/2025     (L) 02/11/2025    K 4.1 02/11/2025    CO2 18 (L) 02/11/2025     02/11/2025    BUN 16 02/11/2025    CREATININE 0.61 02/11/2025     Lab Results   Component Value Date    WBC 19.5  (H) 02/12/2025    HGB 9.7 (L) 02/12/2025    HCT 32.3 (L) 02/12/2025    MCV 88 02/12/2025     02/12/2025                 [1]   Past Medical History:  Diagnosis Date    Abnormal Pap smear of cervix Not recently    Anxiety 2004    Depression     Endometriosis Surgery january 2024    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

## 2025-05-22 ENCOUNTER — TELEMEDICINE (OUTPATIENT)
Dept: OBSTETRICS AND GYNECOLOGY | Facility: CLINIC | Age: 40
End: 2025-05-22
Payer: COMMERCIAL

## 2025-05-22 DIAGNOSIS — R10.2 PELVIC PAIN IN FEMALE: Primary | ICD-10-CM

## 2025-05-23 ASSESSMENT — ENCOUNTER SYMPTOMS
PSYCHIATRIC NEGATIVE: 1
FATIGUE: 1
GASTROINTESTINAL NEGATIVE: 1
CARDIOVASCULAR NEGATIVE: 1
ENDOCRINE NEGATIVE: 1
RESPIRATORY NEGATIVE: 1
EYES NEGATIVE: 1
NEUROLOGICAL NEGATIVE: 1
MUSCULOSKELETAL NEGATIVE: 1

## 2025-06-02 ENCOUNTER — APPOINTMENT (OUTPATIENT)
Dept: OBSTETRICS AND GYNECOLOGY | Facility: CLINIC | Age: 40
End: 2025-06-02
Payer: COMMERCIAL

## 2025-06-02 VITALS
BODY MASS INDEX: 29.19 KG/M2 | HEIGHT: 68 IN | WEIGHT: 192.6 LBS | DIASTOLIC BLOOD PRESSURE: 66 MMHG | SYSTOLIC BLOOD PRESSURE: 100 MMHG

## 2025-06-02 DIAGNOSIS — Z01.419 ENCOUNTER FOR ANNUAL ROUTINE GYNECOLOGICAL EXAMINATION: Primary | ICD-10-CM

## 2025-06-02 DIAGNOSIS — Z12.31 ENCOUNTER FOR SCREENING MAMMOGRAM FOR MALIGNANT NEOPLASM OF BREAST: ICD-10-CM

## 2025-06-02 PROCEDURE — 3008F BODY MASS INDEX DOCD: CPT | Performed by: OBSTETRICS & GYNECOLOGY

## 2025-06-02 PROCEDURE — 1036F TOBACCO NON-USER: CPT | Performed by: OBSTETRICS & GYNECOLOGY

## 2025-06-02 PROCEDURE — 99395 PREV VISIT EST AGE 18-39: CPT | Performed by: OBSTETRICS & GYNECOLOGY

## 2025-06-02 NOTE — PROGRESS NOTES
"GYN ANNUAL EXAM    SUBJECTIVE    Vianney Miner is a 39 y.o. female who presents for annual exam today.  She had a Mirena IUD placed on 3/27.  She had almost daily bleeding for a few weeks which has since stopped.  She recently had what seemed like a normal \"period\" recently.  She is using Mirena IUD for contraception and is happy with this.  She has no complaints today.      PMH - chronic low back pain, overactive bladder, myofascial pain, interstitial cystitis, depression    PSH - c/s x 1    OB history -   OB History    Para Term  AB Living   1 1 1 0 0 1   SAB IAB Ectopic Multiple Live Births   0 0 0 0 1      # Outcome Date GA Lbr Chava/2nd Weight Sex Type Anes PTL Lv   1 Term 25 39w3d  3.68 kg F CS-LTranv EPI  JEFF      Complications: Fetal Intolerance     Last pap -   Normal HPV Negative-      Last mammogram - has not had one     Family history of breast or ovarian cancer - none     OBJECTIVE  /66 (BP Location: Right arm, Patient Position: Sitting)   Ht 1.727 m (5' 8\")   Wt 87.4 kg (192 lb 9.6 oz)   LMP 2025   Breastfeeding No   BMI 29.28 kg/m²     General Appearance   - consistent with stated age, well groomed and cooperative    Integumentary  - skin warm and dry without rash    Head and Neck  - normalocephalic and neck supple    Chest and Lung Exam  - normal breathing effort, no respiratory distress    Breast  - symmetry noted, no mass palpable, no skin change and no nipple discharge.    Abdomen  - soft, nontender and no hepatomegaly, splenomegaly, or mass    Female Genitourinary  - vulva normal without rash or lesion, normal appearing urethral meatus, normal vaginal rugae, no vaginal discharge, uterus normal size & no palpable masses, no adnexal mass, no adnexal tenderness, normal appearing cervix, no cervical motion tenderness, no notable prolapse; IUD strings seen     Peripheral Vascular  - no edema present    ASSESSMENT/PLAN  39 y.o.  female who presents " for annual exam.       Actions performed during this visit include:  - Clinical breast exam  - Clinical pelvic exam  - Pap- UTD and not yet indicated   - Mammogram- Breast cancer screening discussed and screening mammogram ordered as pt turns 40 later this year   - Colon cancer screening- not yet indicated   - Contraception - happy with Mirena IUD.   - STI screening  - Declines     Please return for your next visit in 1 year or sooner as needed.     Caryn Cooper MD

## 2025-06-10 DIAGNOSIS — G89.29 CHRONIC PELVIC PAIN IN FEMALE: ICD-10-CM

## 2025-06-10 DIAGNOSIS — R10.2 CHRONIC PELVIC PAIN IN FEMALE: ICD-10-CM

## 2025-06-10 RX ORDER — TRAMADOL HYDROCHLORIDE 50 MG/1
50 TABLET, FILM COATED ORAL EVERY 6 HOURS PRN
Qty: 30 TABLET | Refills: 0 | Status: SHIPPED | OUTPATIENT
Start: 2025-06-10

## 2025-06-11 ENCOUNTER — APPOINTMENT (OUTPATIENT)
Dept: PRIMARY CARE | Facility: CLINIC | Age: 40
End: 2025-06-11
Payer: COMMERCIAL

## 2025-07-03 ENCOUNTER — APPOINTMENT (OUTPATIENT)
Dept: PRIMARY CARE | Facility: CLINIC | Age: 40
End: 2025-07-03
Payer: COMMERCIAL

## 2025-07-09 ENCOUNTER — APPOINTMENT (OUTPATIENT)
Dept: PRIMARY CARE | Facility: CLINIC | Age: 40
End: 2025-07-09
Payer: COMMERCIAL

## 2025-07-09 VITALS
RESPIRATION RATE: 16 BRPM | BODY MASS INDEX: 28.49 KG/M2 | HEIGHT: 68 IN | SYSTOLIC BLOOD PRESSURE: 116 MMHG | HEART RATE: 72 BPM | TEMPERATURE: 97.7 F | DIASTOLIC BLOOD PRESSURE: 72 MMHG | WEIGHT: 188 LBS | OXYGEN SATURATION: 100 %

## 2025-07-09 DIAGNOSIS — M79.18 MYOFASCIAL PAIN SYNDROME: Primary | ICD-10-CM

## 2025-07-09 DIAGNOSIS — F33.0 MILD EPISODE OF RECURRENT MAJOR DEPRESSIVE DISORDER: ICD-10-CM

## 2025-07-09 PROCEDURE — 99213 OFFICE O/P EST LOW 20 MIN: CPT | Performed by: INTERNAL MEDICINE

## 2025-07-09 PROCEDURE — 3008F BODY MASS INDEX DOCD: CPT | Performed by: INTERNAL MEDICINE

## 2025-07-09 NOTE — PROGRESS NOTES
"Subjective   Vianney Miner is a 39 y.o. female who presents for Depression follow up.    HPI   Mood stable.  Tolerating current med regimen well.  Denies adverse se's.    Review of Systems   Constitutional:  Negative for fatigue and fever.   Respiratory:  Negative for cough and shortness of breath.    Cardiovascular:  Negative for chest pain and leg swelling.   All other systems reviewed and are negative.      Health Maintenance Due   Topic Date Due    Hepatitis B Vaccines (1 of 3 - 19+ 3-dose series) Never done    Varicella Vaccines (1 of 2 - 13+ 2-dose series) Never done    COVID-19 Vaccine (2 - 2024-25 season) 09/01/2024       Objective   /72   Pulse 72   Temp 36.5 °C (97.7 °F)   Resp 16   Ht 1.727 m (5' 8\")   Wt 85.3 kg (188 lb)   SpO2 100%   Breastfeeding No   BMI 28.59 kg/m²     Physical Exam  Vitals and nursing note reviewed.   Constitutional:       Appearance: Normal appearance.   HENT:      Head: Normocephalic.   Eyes:      Conjunctiva/sclera: Conjunctivae normal.      Pupils: Pupils are equal, round, and reactive to light.   Cardiovascular:      Rate and Rhythm: Normal rate and regular rhythm.      Pulses: Normal pulses.      Heart sounds: Normal heart sounds.   Pulmonary:      Effort: Pulmonary effort is normal.      Breath sounds: Normal breath sounds.   Musculoskeletal:         General: No swelling.      Cervical back: Neck supple.   Skin:     General: Skin is warm and dry.   Neurological:      General: No focal deficit present.      Mental Status: She is oriented to person, place, and time.         Assessment/Plan   Problem List Items Addressed This Visit       Myofascial pain syndrome - Primary    Depression     Cont meds  Stable based on symptoms and exam.  Continue established treatment plan and follow up at least yearly.  Reviewed records         "

## 2025-07-10 ASSESSMENT — ENCOUNTER SYMPTOMS
COUGH: 0
SHORTNESS OF BREATH: 0
FEVER: 0
FATIGUE: 0

## 2025-08-04 ENCOUNTER — TELEPHONE (OUTPATIENT)
Dept: OBSTETRICS AND GYNECOLOGY | Facility: CLINIC | Age: 40
End: 2025-08-04
Payer: COMMERCIAL

## 2025-08-04 DIAGNOSIS — G89.29 CHRONIC PELVIC PAIN IN FEMALE: Primary | ICD-10-CM

## 2025-08-04 DIAGNOSIS — R10.2 CHRONIC PELVIC PAIN IN FEMALE: Primary | ICD-10-CM

## 2025-08-05 RX ORDER — TRAMADOL HYDROCHLORIDE 50 MG/1
50 TABLET, FILM COATED ORAL EVERY 6 HOURS PRN
Qty: 30 TABLET | Refills: 0 | Status: SHIPPED | OUTPATIENT
Start: 2025-08-05

## 2025-08-21 ENCOUNTER — TELEMEDICINE (OUTPATIENT)
Dept: OBSTETRICS AND GYNECOLOGY | Facility: CLINIC | Age: 40
End: 2025-08-21
Payer: COMMERCIAL

## 2025-08-21 DIAGNOSIS — R10.2 CHRONIC PELVIC PAIN IN FEMALE: Primary | ICD-10-CM

## 2025-08-21 DIAGNOSIS — G89.29 CHRONIC PELVIC PAIN IN FEMALE: Primary | ICD-10-CM

## 2025-08-21 PROCEDURE — 98012 SYNCH AUDIO-ONLY EST SF 10: CPT | Performed by: OBSTETRICS & GYNECOLOGY

## 2025-08-23 DIAGNOSIS — F41.9 ANXIETY: ICD-10-CM

## 2025-08-25 ENCOUNTER — APPOINTMENT (OUTPATIENT)
Dept: OBSTETRICS AND GYNECOLOGY | Facility: CLINIC | Age: 40
End: 2025-08-25
Payer: COMMERCIAL

## 2025-08-25 RX ORDER — SERTRALINE HYDROCHLORIDE 50 MG/1
50 TABLET, FILM COATED ORAL DAILY
Qty: 90 TABLET | Refills: 3 | Status: SHIPPED | OUTPATIENT
Start: 2025-08-25

## 2025-09-08 ENCOUNTER — APPOINTMENT (OUTPATIENT)
Dept: PRIMARY CARE | Facility: CLINIC | Age: 40
End: 2025-09-08
Payer: COMMERCIAL

## 2025-12-08 ENCOUNTER — APPOINTMENT (OUTPATIENT)
Dept: RADIOLOGY | Facility: CLINIC | Age: 40
End: 2025-12-08
Payer: COMMERCIAL

## 2025-12-15 ENCOUNTER — APPOINTMENT (OUTPATIENT)
Dept: PRIMARY CARE | Facility: CLINIC | Age: 40
End: 2025-12-15
Payer: COMMERCIAL

## 2026-06-02 ENCOUNTER — APPOINTMENT (OUTPATIENT)
Dept: OBSTETRICS AND GYNECOLOGY | Facility: CLINIC | Age: 41
End: 2026-06-02
Payer: COMMERCIAL

## (undated) DEVICE — Device